# Patient Record
Sex: MALE | Race: WHITE | Employment: OTHER | URBAN - METROPOLITAN AREA
[De-identification: names, ages, dates, MRNs, and addresses within clinical notes are randomized per-mention and may not be internally consistent; named-entity substitution may affect disease eponyms.]

---

## 2017-05-30 ENCOUNTER — OP HISTORICAL/CONVERTED ENCOUNTER (OUTPATIENT)
Dept: OTHER | Age: 82
End: 2017-05-30

## 2019-08-27 ENCOUNTER — OFFICE VISIT (OUTPATIENT)
Dept: CARDIOLOGY CLINIC | Age: 84
End: 2019-08-27

## 2019-08-27 ENCOUNTER — CLINICAL SUPPORT (OUTPATIENT)
Dept: CARDIOLOGY CLINIC | Age: 84
End: 2019-08-27

## 2019-08-27 VITALS
SYSTOLIC BLOOD PRESSURE: 150 MMHG | HEART RATE: 80 BPM | BODY MASS INDEX: 25.9 KG/M2 | HEIGHT: 71 IN | WEIGHT: 185 LBS | DIASTOLIC BLOOD PRESSURE: 80 MMHG | RESPIRATION RATE: 18 BRPM | OXYGEN SATURATION: 95 %

## 2019-08-27 DIAGNOSIS — I49.5 SICK SINUS SYNDROME (HCC): ICD-10-CM

## 2019-08-27 DIAGNOSIS — E11.9 CONTROLLED TYPE 2 DIABETES MELLITUS WITHOUT COMPLICATION, WITHOUT LONG-TERM CURRENT USE OF INSULIN (HCC): ICD-10-CM

## 2019-08-27 DIAGNOSIS — Z95.0 PACEMAKER: ICD-10-CM

## 2019-08-27 DIAGNOSIS — Z95.0 CARDIAC PACEMAKER IN SITU: Primary | ICD-10-CM

## 2019-08-27 DIAGNOSIS — E78.2 MIXED HYPERLIPIDEMIA: ICD-10-CM

## 2019-08-27 DIAGNOSIS — R42 DIZZINESS: Primary | ICD-10-CM

## 2019-08-27 DIAGNOSIS — I10 ESSENTIAL HYPERTENSION: ICD-10-CM

## 2019-08-27 PROBLEM — I25.10 CORONARY ARTERY DISEASE INVOLVING NATIVE CORONARY ARTERY OF NATIVE HEART: Status: ACTIVE | Noted: 2019-08-27

## 2019-08-27 RX ORDER — METOPROLOL SUCCINATE 50 MG/1
TABLET, EXTENDED RELEASE ORAL
COMMUNITY
Start: 2019-08-06 | End: 2019-10-11 | Stop reason: SDUPTHER

## 2019-08-27 RX ORDER — DONEPEZIL HYDROCHLORIDE 5 MG/1
5 TABLET, FILM COATED ORAL
COMMUNITY
Start: 2019-08-06 | End: 2019-10-11 | Stop reason: SDUPTHER

## 2019-08-27 RX ORDER — AMLODIPINE BESYLATE AND BENAZEPRIL HYDROCHLORIDE 10; 40 MG/1; MG/1
1 CAPSULE ORAL DAILY
COMMUNITY
Start: 2019-08-06 | End: 2019-10-11 | Stop reason: SDUPTHER

## 2019-08-27 RX ORDER — CHLORTHALIDONE 25 MG/1
25 TABLET ORAL DAILY
COMMUNITY
Start: 2019-08-06 | End: 2019-10-11 | Stop reason: SDUPTHER

## 2019-08-27 RX ORDER — LEVOTHYROXINE SODIUM 100 UG/1
100 TABLET ORAL
COMMUNITY
Start: 2019-08-06 | End: 2019-10-11 | Stop reason: SDUPTHER

## 2019-08-27 RX ORDER — GLYBURIDE 5 MG/1
10 TABLET ORAL 2 TIMES DAILY WITH MEALS
COMMUNITY
Start: 2019-08-07 | End: 2019-10-11 | Stop reason: SDUPTHER

## 2019-08-27 RX ORDER — CLOPIDOGREL BISULFATE 75 MG/1
TABLET ORAL
Status: ON HOLD | COMMUNITY
Start: 2019-08-06 | End: 2019-09-25 | Stop reason: SDUPTHER

## 2019-08-27 NOTE — PROGRESS NOTES
Subjective: Bailey Crespo is a 80 y.o. male is here for EP consult. PMHx of CAD with stents, hypothyroid, HTN, DM. The patient denies chest pain, orthopnea, PND, LE edema, palpitations, syncope, presyncope or fatigue. Patient Active Problem List    Diagnosis Date Noted    Hypertension 2019    Coronary artery disease involving native coronary artery of native heart 2019    Controlled type 2 diabetes mellitus, without long-term current use of insulin ([facility] 2019    Pacemaker 2019      No primary care provider on file. Past Medical History:   Diagnosis Date    Diabetes ([facility]     Essential hypertension     Thyroid disease       Past Surgical History:   Procedure Laterality Date    HX PACEMAKER       No Known Allergies   Family History   Problem Relation Age of Onset    Diabetes Mother     Heart Surgery Father     No Known Problems Sister     No Known Problems Brother     No Known Problems Sister     No Known Problems Sister     negative for cardiac disease  Social History     Socioeconomic History    Marital status:      Spouse name: Not on file    Number of children: Not on file    Years of education: Not on file    Highest education level: Not on file   Tobacco Use    Smoking status: Former Smoker     Last attempt to quit: 1979     Years since quittin.0    Smokeless tobacco: Never Used   Substance and Sexual Activity    Alcohol use: Not Currently    Drug use: Never     Current Outpatient Medications   Medication Sig    amLODIPine-benazepril (LOTREL) 10-40 mg per capsule Take 1 Cap by mouth daily.  clopidogrel (PLAVIX) 75 mg tab     chlorthalidone (HYGROTEN) 25 mg tablet Take 25 mg by mouth daily.  donepezil (ARICEPT) 5 mg tablet Take 5 mg by mouth nightly.  glyBURIDE (DIABETA) 5 mg tablet Take 10 mg by mouth two (2) times daily (with meals).     levothyroxine (SYNTHROID) 100 mcg tablet Take 100 mcg by mouth Daily (before breakfast).  metoprolol succinate (TOPROL-XL) 50 mg XL tablet      No current facility-administered medications for this visit. Vitals:    08/27/19 1313 08/27/19 1323   BP: 169/88 150/80   Pulse: 80    Resp: 18    SpO2: 95%    Weight: 185 lb (83.9 kg)    Height: 5' 11\" (1.803 m)        I have reviewed the nurses notes, vitals, problem list, allergy list, medical history, family, social history and medications. Review of Symptoms:    General: Pt denies excessive weight gain or loss. Pt is able to conduct ADL's  HEENT: Denies blurred vision, headaches, epistaxis and difficulty swallowing. Respiratory: Reports shortness of breath, CORDOVA. Cardiovascular: Denies precordial pain, palpitations, edema or PND  Gastrointestinal: Denies poor appetite, indigestion, abdominal pain or blood in stool  Urinary: Denies dysuria, pyuria  Musculoskeletal: Denies pain or swelling from muscles or joints  Neurologic: Denies tremor, paresthesias, or sensory motor disturbance. Reports dizziness. Skin: Denies rash, itching or texture change. Psych: Denies depression    Physical Exam:      General: Well developed, in no acute distress. HEENT: Eyes - PERRL, no jvd  Heart:  Normal S1/S2 negative S3 or S4. Regular, no murmur, gallop or rub. Respiratory: Clear bilaterally x 4, no wheezing or rales  Extremities:  No edema, normal cap refill, no cyanosis. Musculoskeletal: No clubbing  Neuro: A&Ox3, speech clear, gait stable. Skin: Skin color is normal. No rashes or lesions. Non diaphoretic  Vascular: 2+ pulses symmetric in all extremities    Cardiographics    Ekg: V paced    No results found for this or any previous visit.       No results found for: WBC, HGBPOC, HGB, HGBP, HCTPOC, HCT, PHCT, RBCH, PLT, MCV, HGBEXT, HCTEXT, PLTEXT, HGBEXT, HCTEXT, PLTEXT   No results found for: NA, K, CL, CO2, AGAP, GLU, BUN, CREA, BUCR, GFRAA, GFRNA, CA, TBIL, TBILI, GPT, SGOT, AP, TP, ALB, GLOB, AGRAT, ALT   No results found for: TSH, TSH2, TSH3, TSHP, TSHEXT, TSHEXT     Assessment:             ICD-10-CM ICD-9-CM    1. Dizziness R42 780.4 ECHO ADULT COMPLETE      NUCLEAR CARDIAC STRESS TEST   2. Elevated blood pressure affecting pregnancy in first trimester, antepartum O16.1 642.33 ECHO ADULT COMPLETE      NUCLEAR CARDIAC STRESS TEST   3. Mixed hyperlipidemia E78.2 272.2 ECHO ADULT COMPLETE      NUCLEAR CARDIAC STRESS TEST   4. Essential hypertension I10 401.9 AMB POC EKG ROUTINE W/ 12 LEADS, INTER & REP      ECHO ADULT COMPLETE      NUCLEAR CARDIAC STRESS TEST   5. Controlled type 2 diabetes mellitus without complication, without long-term current use of insulin (HCC) E11.9 250.00 ECHO ADULT COMPLETE      NUCLEAR CARDIAC STRESS TEST   6. Pacemaker Z95.0 V45.01 ECHO ADULT COMPLETE      NUCLEAR CARDIAC STRESS TEST     Orders Placed This Encounter    AMB POC EKG ROUTINE W/ 12 LEADS, INTER & REP     Order Specific Question:   Reason for Exam:     Answer:   elevated blood pressure    amLODIPine-benazepril (LOTREL) 10-40 mg per capsule     Sig: Take 1 Cap by mouth daily.  clopidogrel (PLAVIX) 75 mg tab    chlorthalidone (HYGROTEN) 25 mg tablet     Sig: Take 25 mg by mouth daily.  donepezil (ARICEPT) 5 mg tablet     Sig: Take 5 mg by mouth nightly.  glyBURIDE (DIABETA) 5 mg tablet     Sig: Take 10 mg by mouth two (2) times daily (with meals).  levothyroxine (SYNTHROID) 100 mcg tablet     Sig: Take 100 mcg by mouth Daily (before breakfast).  metoprolol succinate (TOPROL-XL) 50 mg XL tablet        Plan:     Mr Brittney Gonzales is an 80year old male, who presents with limiting CORDOVA and dizziness. He has dual chamber pm, 12 % AP and 89% RVP with 5 years remaining battery. ON BB and  ARB. Will get echo with his complaints of CORDOVA, RVP and hx of CAD. Nuclear stress ordered to evaluate for ischemia. He will follow up pending test results. Continue medical management for dementia, CAD and DM.     Thank you for allowing me to participate in Everett Morales 's care. Cody Bender NP    Patient seen and examined. All pertinent data reviewed. I have reviewed detailed note as outlined by Cody Bender NP. Case discussed with Nursing/medical assistant staff and Cody Bender NP. Plans as outlined. A paced 12% for sick sinus and v paced for high grade av block. C/o campbell - will obtain stress and echo to eval for ischemic and structural disease. F/u post testing. Cont med rx for dm, cad and htn.     Genie Sun MD, Zaheer Maki

## 2019-08-27 NOTE — PROGRESS NOTES
Chief Complaint   Patient presents with   [address] [place] [address]     Seen at the request of Dr. Favio Grimes     1. Have you been to the ER, urgent care clinic since your last visit? Hospitalized since your last visit? No    2. Have you seen or consulted any other health care providers outside of the [address] [place] [address] since your last visit? Include any pap smears or colon screening. No     Pt is unsure of medical and surgical history.

## 2019-09-11 ENCOUNTER — TELEPHONE (OUTPATIENT)
Dept: CARDIOLOGY CLINIC | Age: 84
End: 2019-09-11

## 2019-09-11 NOTE — TELEPHONE ENCOUNTER
Called patient to confirm Nuclear stress test for 09/13. Reviewed with patient's daughter the need to hold all caffeine containing food, beverages and medicines for 24 hours. Pt's daughter verbalized understanding.

## 2019-09-17 ENCOUNTER — OFFICE VISIT (OUTPATIENT)
Dept: CARDIOLOGY CLINIC | Age: 84
End: 2019-09-17

## 2019-09-17 ENCOUNTER — HOSPITAL ENCOUNTER (OUTPATIENT)
Dept: LAB | Age: 84
Discharge: HOME | End: 2019-09-17
Payer: MEDICARE

## 2019-09-17 VITALS
SYSTOLIC BLOOD PRESSURE: 120 MMHG | WEIGHT: 184.1 LBS | DIASTOLIC BLOOD PRESSURE: 70 MMHG | HEIGHT: 71 IN | HEART RATE: 80 BPM | RESPIRATION RATE: 16 BRPM | BODY MASS INDEX: 25.77 KG/M2 | OXYGEN SATURATION: 95 %

## 2019-09-17 DIAGNOSIS — E78.2 MIXED HYPERLIPIDEMIA: ICD-10-CM

## 2019-09-17 DIAGNOSIS — I49.5 SICK SINUS SYNDROME (HCC): ICD-10-CM

## 2019-09-17 DIAGNOSIS — I25.10 CORONARY ARTERY DISEASE INVOLVING NATIVE CORONARY ARTERY OF NATIVE HEART, ANGINA PRESENCE UNSPECIFIED: Primary | ICD-10-CM

## 2019-09-17 DIAGNOSIS — I73.9 PAD (PERIPHERAL ARTERY DISEASE) (HCC): ICD-10-CM

## 2019-09-17 DIAGNOSIS — Z95.0 CARDIAC PACEMAKER IN SITU: ICD-10-CM

## 2019-09-17 DIAGNOSIS — I10 ESSENTIAL HYPERTENSION: ICD-10-CM

## 2019-09-17 DIAGNOSIS — R06.09 DOE (DYSPNEA ON EXERTION): ICD-10-CM

## 2019-09-17 PROCEDURE — 85027 COMPLETE CBC AUTOMATED: CPT

## 2019-09-17 PROCEDURE — 80048 BASIC METABOLIC PNL TOTAL CA: CPT

## 2019-09-17 PROCEDURE — 85610 PROTHROMBIN TIME: CPT

## 2019-09-17 PROCEDURE — 36415 COLL VENOUS BLD VENIPUNCTURE: CPT

## 2019-09-17 RX ORDER — ASPIRIN 81 MG/1
TABLET ORAL DAILY
COMMUNITY
End: 2020-10-06 | Stop reason: SDUPTHER

## 2019-09-17 NOTE — PROGRESS NOTES
1. Have you been to the ER, urgent care clinic since your last visit? Hospitalized since your last visit? No    2. Have you seen or consulted any other health care providers outside of the [address] [place] [address] since your last visit? Include any pap smears or colon screening. No    Chief Complaint   Patient presents with    Follow-up    Results    Patient here for review of recent testing C/O dizziness yesterday in am went back to bed with relief SOB with activity.

## 2019-09-17 NOTE — PROGRESS NOTES
[address], [facility]  [phone]     Subjective: Luciano Drummond is a 80 y.o. male with pmhx CAD PAD HTN HLD DM SSS with PM is here to establish care. Saw Dr Mellisa Enriquez , + campbell and dizziness. They ordered echo and stress test to evaluate. Today, he still has intermittent dizziness and limiting campbell. Per daughter, when pt exerts himself such as walking, he gets easily shortwinded with associated sweats and nausea / vomiting. Previously followed by Dr Jen Cr    The patient denies chest pain, orthopnea, PND, LE edema, palpitations, syncope, or presyncope.        Patient Active Problem List    Diagnosis Date Noted    Hypertension 2019    Coronary artery disease involving native coronary artery of native heart 2019    Controlled type 2 diabetes mellitus, without long-term current use of insulin [facility] 2019    Pacemaker 2019      Ike Dejesus MD  Past Medical History:   Diagnosis Date    Diabetes [facility])     Essential hypertension     Thyroid disease       Past Surgical History:   Procedure Laterality Date    HX PACEMAKER       No Known Allergies   Family History   Problem Relation Age of Onset    Diabetes Mother     Heart Surgery Father     No Known Problems Sister     No Known Problems Brother     No Known Problems Sister     No Known Problems Sister       Social History     Socioeconomic History    Marital status:      Spouse name: Not on file    Number of children: Not on file    Years of education: Not on file    Highest education level: Not on file   Occupational History    Not on file   Social Needs    Financial resource strain: Not on file    Food insecurity:     Worry: Not on file     Inability: Not on file    Transportation needs:     Medical: Not on file     Non-medical: Not on file   Tobacco Use    Smoking status: Former Smoker     Last attempt to quit: 1979     Years since quittin.0  Smokeless tobacco: Never Used   Substance and Sexual Activity    Alcohol use: Not Currently    Drug use: Never    Sexual activity: Not on file   Lifestyle    Physical activity:     Days per week: Not on file     Minutes per session: Not on file    Stress: Not on file   Relationships    Social connections:     Talks on phone: Not on file     Gets together: Not on file     Attends Mu-ism service: Not on file     Active member of club or organization: Not on file     Attends meetings of clubs or organizations: Not on file     Relationship status: Not on file    Intimate partner violence:     Fear of current or ex partner: Not on file     Emotionally abused: Not on file     Physically abused: Not on file     Forced sexual activity: Not on file   Other Topics Concern    Not on file   Social History Narrative    Not on file      Current Outpatient Medications   Medication Sig    aspirin delayed-release 81 mg tablet Take  by mouth daily.  amLODIPine-benazepril (LOTREL) 10-40 mg per capsule Take 1 Cap by mouth daily.  clopidogrel (PLAVIX) 75 mg tab     chlorthalidone (HYGROTEN) 25 mg tablet Take 25 mg by mouth daily.  donepezil (ARICEPT) 5 mg tablet Take 5 mg by mouth nightly.  glyBURIDE (DIABETA) 5 mg tablet Take 10 mg by mouth two (2) times daily (with meals).  levothyroxine (SYNTHROID) 100 mcg tablet Take 100 mcg by mouth Daily (before breakfast).  metoprolol succinate (TOPROL-XL) 50 mg XL tablet      No current facility-administered medications for this visit. Review of Symptoms:  11 systems reviewed, negative other than as stated in the HPI    Physical ExamPhysical Exam:    Vitals:    09/17/19 1056 09/17/19 1057 09/17/19 1058 09/17/19 1059   BP: 150/80 110/70 90/60 120/70   Pulse:  80     Resp:  16     SpO2:  95%     Weight:  184 lb 1.6 oz (83.5 kg)     Height:  5' 11\" (1.803 m)       Body mass index is 25.68 kg/m². General PE  Gen:  NAD  Mental Status - Alert.  General Appearance - Not in acute distress. HEENT:  PERRL, no carotid bruits or JVD  Chest and Lung Exam   Inspection: Accessory muscles - No use of accessory muscles in breathing. Auscultation:   Breath sounds: - Normal.   Cardiovascular   Inspection: Jugular vein - Bilateral - Inspection Normal.   Palpation/Percussion:   Apical Impulse: - Normal.   Auscultation: Rhythm - Regular. Heart Sounds - S1 WNL and S2 WNL. No S3 or S4. Murmurs & Other Heart Sounds: Auscultation of the heart reveals - No Murmurs. Peripheral Vascular   Upper Extremity: Inspection - Bilateral - No Cyanotic nailbeds or Digital clubbing. Lower Extremity:   Palpation: Edema - Bilateral - No edema. Abdomen:   Soft, non-tender, bowel sounds are active. Neuro: A&O times 3, CN and motor grossly WNL    Labs:   No results found for: CHOL, CHOLX, CHLST, CHOLV, 381663, HDL, HDLP, LDL, LDLC, DLDLP, TGLX, TRIGL, TRIGP, CHHD, CHHDX  No results found for: CPK, CPKX, CPX  No results found for: NA, K, CL, CO2, AGAP, GLU, BUN, CREA, BUCR, GFRAA, GFRNA, CA, TBIL, TBILI, GPT, SGOT, AP, TP, ALB, GLOB, AGRAT, ALT    EKG:       Assessment:     Assessment:      1. Coronary artery disease involving native coronary artery of native heart, angina presence unspecified    2. Sick sinus syndrome (HCC)    3. Cardiac pacemaker in situ    4. Mixed hyperlipidemia    5. Essential hypertension    6. Abnormal nuclear stress test    7. PAD (peripheral artery disease) ([facility]        Orders Placed This Encounter    aspirin delayed-release 81 mg tablet     Sig: Take  by mouth daily. Plan: This is a 80 y.o. male with pmhx CAD PAD HTN HLD DM SSS with PM is here to establish care. Saw Dr Amanda Avery 8/19, + campbell and dizziness. They ordered echo and stress test to evaluate. Today, he still has intermittent dizziness and limiting campbell. Per daughter, when pt exerts himself such as walking, he gets easily shortwinded with associated sweats and nausea / vomiting.   Previously followed by Dr Delmy Gurrola. They are not sure if he had stents placed in his heart or his legs or both. CAD  +ve NST on 9/19  9 episodes VT per device check 8/19  He is on ASA, Plavix  On 2 antianginals: BB, Amlodipine  On statin  Will check labs and schedule for cardiac cath with Dr Deal Reuben: [id number]     Normal EF, mild AS per echo 9/19    HTN  Controlled with current therapy    SSS post PM  8/19 device check: x 9 VT last 6/14/19  Normal EF, mild AS per echo 9/19  Device followed by Dr Manjit Benoit  On statin. Labs and lipids per PCP. DM  On oral agent      Continue current care and f/u post cardiac cath      Yamile Reed NP       [facility]    9/18/2019         Patient seen, examined by me personally. Plan discussed as detailed. Agree with note as outlined by  NP. I confirm findings in history and physical exam. No additional findings noted. Agree with plan as outlined above. Known CAD, runs of VT, abnormal stress test. Discussed cath/PCI with patient and daughter. Radial pulse is sluggish. May need femoral approach.     Susanna Whalen MD

## 2019-09-17 NOTE — H&P (VIEW-ONLY)
[address], [address] [facility]  [phone] Subjective: Vishal Padilla is a 80 y.o. male with pmhx CAD PAD HTN HLD DM SSS with PM is here to establish care. Saw Dr Janet Quintero , + campbell and dizziness. They ordered echo and stress test to evaluate. Today, he still has intermittent dizziness and limiting campbell. Per daughter, when pt exerts himself such as walking, he gets easily shortwinded with associated sweats and nausea / vomiting. Previously followed by Dr Yusuf Marley The patient denies chest pain, orthopnea, PND, LE edema, palpitations, syncope, or presyncope. Patient Active Problem List  
 Diagnosis Date Noted  Hypertension 2019  Coronary artery disease involving native coronary artery of native heart 2019  Controlled type 2 diabetes mellitus, without long-term current use of insulin ([address] 2019  Pacemaker 2019 Tressa Mcelroy MD 
Past Medical History:  
Diagnosis Date  Diabetes ([address]  Essential hypertension  Thyroid disease Past Surgical History:  
Procedure Laterality Date  HX PACEMAKER No Known Allergies Family History Problem Relation Age of Onset  Diabetes Mother  Heart Surgery Father  No Known Problems Sister  No Known Problems Brother  No Known Problems Sister  No Known Problems Sister Social History Socioeconomic History  Marital status:  Spouse name: Not on file  Number of children: Not on file  Years of education: Not on file  Highest education level: Not on file Occupational History  Not on file Social Needs  Financial resource strain: Not on file  Food insecurity:  
  Worry: Not on file Inability: Not on file  Transportation needs:  
  Medical: Not on file Non-medical: Not on file Tobacco Use  Smoking status: Former Smoker Last attempt to quit: 1979   Years since quittin.0  Smokeless tobacco: Never Used Substance and Sexual Activity  Alcohol use: Not Currently  Drug use: Never  Sexual activity: Not on file Lifestyle  Physical activity:  
  Days per week: Not on file Minutes per session: Not on file  Stress: Not on file Relationships  Social connections:  
  Talks on phone: Not on file Gets together: Not on file Attends Hinduism service: Not on file Active member of club or organization: Not on file Attends meetings of clubs or organizations: Not on file Relationship status: Not on file  Intimate partner violence:  
  Fear of current or ex partner: Not on file Emotionally abused: Not on file Physically abused: Not on file Forced sexual activity: Not on file Other Topics Concern  Not on file Social History Narrative  Not on file Current Outpatient Medications Medication Sig  
 aspirin delayed-release 81 mg tablet Take  by mouth daily.  amLODIPine-benazepril (LOTREL) 10-40 mg per capsule Take 1 Cap by mouth daily.  clopidogrel (PLAVIX) 75 mg tab  chlorthalidone (HYGROTEN) 25 mg tablet Take 25 mg by mouth daily.  donepezil (ARICEPT) 5 mg tablet Take 5 mg by mouth nightly.  glyBURIDE (DIABETA) 5 mg tablet Take 10 mg by mouth two (2) times daily (with meals).  levothyroxine (SYNTHROID) 100 mcg tablet Take 100 mcg by mouth Daily (before breakfast).  metoprolol succinate (TOPROL-XL) 50 mg XL tablet No current facility-administered medications for this visit. Review of Symptoms: 
11 systems reviewed, negative other than as stated in the HPI Physical ExamPhysical Exam:   
Vitals:  
 09/17/19 1056 09/17/19 1057 09/17/19 1058 09/17/19 1059 BP: 150/80 110/70 90/60 120/70 Pulse:  80 Resp:  16 SpO2:  95% Weight:  184 lb 1.6 oz (83.5 kg) Height:  5' 11\" (1.803 m) Body mass index is 25.68 kg/m². General PE Gen:  NAD Mental Status - Alert.  General Appearance - Not in acute distress. HEENT: 
PERRL, no carotid bruits or JVD Chest and Lung Exam  
Inspection: Accessory muscles - No use of accessory muscles in breathing. Auscultation:  
Breath sounds: - Normal.  
Cardiovascular Inspection: Jugular vein - Bilateral - Inspection Normal.  
Palpation/Percussion:  
Apical Impulse: - Normal.  
Auscultation: Rhythm - Regular. Heart Sounds - S1 WNL and S2 WNL. No S3 or S4. Murmurs & Other Heart Sounds: Auscultation of the heart reveals - No Murmurs. Peripheral Vascular Upper Extremity: Inspection - Bilateral - No Cyanotic nailbeds or Digital clubbing. Lower Extremity:  
Palpation: Edema - Bilateral - No edema. Abdomen:   Soft, non-tender, bowel sounds are active. Neuro: A&O times 3, CN and motor grossly WNL Labs:  
No results found for: CHOL, CHOLX, CHLST, CHOLV, 711117, HDL, HDLP, LDL, LDLC, DLDLP, TGLX, TRIGL, TRIGP, CHHD, CHHDX No results found for: CPK, CPKX, CPX No results found for: NA, K, CL, CO2, AGAP, GLU, BUN, CREA, BUCR, GFRAA, GFRNA, CA, TBIL, TBILI, GPT, SGOT, AP, TP, ALB, GLOB, AGRAT, ALT 
 
EKG: 
 
 
 Assessment: 
 
 Assessment: 1. Coronary artery disease involving native coronary artery of native heart, angina presence unspecified 2. Sick sinus syndrome ([address] 3. Cardiac pacemaker in situ 4. Mixed hyperlipidemia 5. Essential hypertension 6. Abnormal nuclear stress test   
7. PAD (peripheral artery disease) ([address] Orders Placed This Encounter  aspirin delayed-release 81 mg tablet Sig: Take  by mouth daily. Plan: This is a 80 y.o. male with pmhx CAD PAD HTN HLD DM SSS with PM is here to establish care. Saw Dr Sage Cesar 8/19, + campbell and dizziness. They ordered echo and stress test to evaluate. Today, he still has intermittent dizziness and limiting campbell. Per daughter, when pt exerts himself such as walking, he gets easily shortwinded with associated sweats and nausea / vomiting.  
Previously followed by Dr Nadia You. They are not sure if he had stents placed in his heart or his legs or both. CAD 
+ve NST on 9/19 
9 episodes VT per device check 8/19 He is on ASA, Plavix On 2 antianginals: BB, Amlodipine On statin Will check labs and schedule for cardiac cath with Dr Ontiveros Code: 15024 Normal EF, mild AS per echo 9/19 HTN Controlled with current therapy SSS post PM 
8/19 device check: x 9 VT last 6/14/19 Normal EF, mild AS per echo 9/19 Device followed by Dr Subhash Syed On statin. Labs and lipids per PCP. DM On oral agent Continue current care and f/u post cardiac cath Derrell Morris NP [facility] 9/18/2019 Patient seen, examined by me personally. Plan discussed as detailed. Agree with note as outlined by  NP. I confirm findings in history and physical exam. No additional findings noted. Agree with plan as outlined above. Known CAD, runs of VT, abnormal stress test. Discussed cath/PCI with patient and daughter. Radial pulse is sluggish. May need femoral approach.  
 
Zenobia Bradley MD

## 2019-09-18 DIAGNOSIS — O16.1 ELEVATED BLOOD PRESSURE AFFECTING PREGNANCY IN FIRST TRIMESTER, ANTEPARTUM: ICD-10-CM

## 2019-09-18 DIAGNOSIS — E11.9 CONTROLLED TYPE 2 DIABETES MELLITUS WITHOUT COMPLICATION, WITHOUT LONG-TERM CURRENT USE OF INSULIN (HCC): ICD-10-CM

## 2019-09-18 LAB
BUN SERPL-MCNC: 26 MG/DL (ref 8–27)
BUN/CREAT SERPL: 14 (ref 10–24)
CALCIUM SERPL-MCNC: 9.7 MG/DL (ref 8.6–10.2)
CHLORIDE SERPL-SCNC: 94 MMOL/L (ref 96–106)
CO2 SERPL-SCNC: 21 MMOL/L (ref 20–29)
CREAT SERPL-MCNC: 1.91 MG/DL (ref 0.76–1.27)
ERYTHROCYTE [DISTWIDTH] IN BLOOD BY AUTOMATED COUNT: 13 % (ref 12.3–15.4)
GLUCOSE SERPL-MCNC: 256 MG/DL (ref 65–99)
HCT VFR BLD AUTO: 43.6 % (ref 37.5–51)
HGB BLD-MCNC: 14.6 G/DL (ref 13–17.7)
INR PPP: 1 (ref 0.8–1.2)
INTERPRETATION: NORMAL
MCH RBC QN AUTO: 29 PG (ref 26.6–33)
MCHC RBC AUTO-ENTMCNC: 33.5 G/DL (ref 31.5–35.7)
MCV RBC AUTO: 87 FL (ref 79–97)
PLATELET # BLD AUTO: 341 X10E3/UL (ref 150–450)
POTASSIUM SERPL-SCNC: 4.7 MMOL/L (ref 3.5–5.2)
PROTHROMBIN TIME: 11 SEC (ref 9.1–12)
RBC # BLD AUTO: 5.03 X10E6/UL (ref 4.14–5.8)
SODIUM SERPL-SCNC: 132 MMOL/L (ref 134–144)
WBC # BLD AUTO: 11.4 X10E3/UL (ref 3.4–10.8)

## 2019-09-23 ENCOUNTER — APPOINTMENT (OUTPATIENT)
Dept: VASCULAR SURGERY | Age: 84
DRG: 246 | End: 2019-09-23
Attending: PHYSICIAN ASSISTANT
Payer: MEDICARE

## 2019-09-23 ENCOUNTER — HOSPITAL ENCOUNTER (INPATIENT)
Age: 84
LOS: 1 days | Discharge: HOME | DRG: 246 | End: 2019-09-25
Attending: INTERNAL MEDICINE | Admitting: INTERNAL MEDICINE
Payer: MEDICARE

## 2019-09-23 DIAGNOSIS — Z01.810 PREOP CARDIOVASCULAR EXAM: ICD-10-CM

## 2019-09-23 DIAGNOSIS — R07.9 CHEST PAIN, UNSPECIFIED TYPE: ICD-10-CM

## 2019-09-23 DIAGNOSIS — I65.23 BILATERAL CAROTID ARTERY STENOSIS: ICD-10-CM

## 2019-09-23 PROBLEM — I20.9 ANGINA, CLASS III (HCC): Status: ACTIVE | Noted: 2019-09-23

## 2019-09-23 LAB
END DIASTOLIC PRESSURE: 10
GLUCOSE BLD STRIP.AUTO-MCNC: 153 MG/DL (ref 65–100)
GLUCOSE BLD STRIP.AUTO-MCNC: 183 MG/DL (ref 65–100)
LEFT CCA DIST DIAS: 16 CM/S
LEFT CCA DIST SYS: 54.8 CM/S
LEFT CCA PROX DIAS: 13.1 CM/S
LEFT CCA PROX SYS: 40.8 CM/S
LEFT ECA DIAS: 0 CM/S
LEFT ECA SYS: 21.4 CM/S
LEFT ICA DIST DIAS: 9 CM/S
LEFT ICA DIST SYS: 38.7 CM/S
LEFT ICA MID DIAS: 12.5 CM/S
LEFT ICA MID SYS: 49.2 CM/S
LEFT ICA PROX DIAS: 11.7 CM/S
LEFT ICA PROX SYS: 41.4 CM/S
LEFT ICA/CCA SYS: 0.9
LEFT SUBCLAVIAN DIAS: 0 CM/S
LEFT SUBCLAVIAN SYS: 65.5 CM/S
LEFT VERTEBRAL DIAS: 10.64 CM/S
LEFT VERTEBRAL SYS: 60.1 CM/S
RIGHT CCA DIST DIAS: 0 CM/S
RIGHT CCA DIST SYS: 54.8 CM/S
RIGHT CCA PROX DIAS: 0 CM/S
RIGHT CCA PROX SYS: 50.6 CM/S
RIGHT ECA DIAS: 0 CM/S
RIGHT ECA SYS: 99.6 CM/S
RIGHT ICA DIST DIAS: 10.1 CM/S
RIGHT ICA DIST SYS: 41.5 CM/S
RIGHT ICA MID DIAS: 14.4 CM/S
RIGHT ICA MID SYS: 59.6 CM/S
RIGHT ICA PROX DIAS: 14.4 CM/S
RIGHT ICA PROX SYS: 62.8 CM/S
RIGHT ICA/CCA SYS: 1.2
RIGHT SUBCLAVIAN DIAS: 0 CM/S
RIGHT SUBCLAVIAN SYS: 58.5 CM/S
RIGHT VERTEBRAL DIAS: 11.29 CM/S
RIGHT VERTEBRAL SYS: 48.3 CM/S
SERVICE CMNT-IMP: ABNORMAL
SERVICE CMNT-IMP: ABNORMAL

## 2019-09-23 PROCEDURE — 99218 HC RM OBSERVATION: CPT

## 2019-09-23 PROCEDURE — 82962 GLUCOSE BLOOD TEST: CPT

## 2019-09-23 PROCEDURE — B2151ZZ FLUOROSCOPY OF LEFT HEART USING LOW OSMOLAR CONTRAST: ICD-10-PCS | Performed by: INTERNAL MEDICINE

## 2019-09-23 PROCEDURE — B241ZZ3 ULTRASONOGRAPHY OF MULTIPLE CORONARY ARTERIES, INTRAVASCULAR: ICD-10-PCS | Performed by: INTERNAL MEDICINE

## 2019-09-23 PROCEDURE — 74011000250 HC RX REV CODE- 250: Performed by: INTERNAL MEDICINE

## 2019-09-23 PROCEDURE — 77030015766: Performed by: INTERNAL MEDICINE

## 2019-09-23 PROCEDURE — 99152 MOD SED SAME PHYS/QHP 5/>YRS: CPT | Performed by: INTERNAL MEDICINE

## 2019-09-23 PROCEDURE — C1894 INTRO/SHEATH, NON-LASER: HCPCS | Performed by: INTERNAL MEDICINE

## 2019-09-23 PROCEDURE — B2111ZZ FLUOROSCOPY OF MULTIPLE CORONARY ARTERIES USING LOW OSMOLAR CONTRAST: ICD-10-PCS | Performed by: INTERNAL MEDICINE

## 2019-09-23 PROCEDURE — C1769 GUIDE WIRE: HCPCS | Performed by: INTERNAL MEDICINE

## 2019-09-23 PROCEDURE — 74011250636 HC RX REV CODE- 250/636: Performed by: INTERNAL MEDICINE

## 2019-09-23 PROCEDURE — 77030004549 HC CATH ANGI DX PRF MRTM -A: Performed by: INTERNAL MEDICINE

## 2019-09-23 PROCEDURE — 74011250636 HC RX REV CODE- 250/636: Performed by: NURSE PRACTITIONER

## 2019-09-23 PROCEDURE — 74011250637 HC RX REV CODE- 250/637: Performed by: NURSE PRACTITIONER

## 2019-09-23 PROCEDURE — 77030028837 HC SYR ANGI PWR INJ COEU -A: Performed by: INTERNAL MEDICINE

## 2019-09-23 PROCEDURE — 77030010221 HC SPLNT WR POS TELE -B: Performed by: INTERNAL MEDICINE

## 2019-09-23 PROCEDURE — 4A023N7 MEASUREMENT OF CARDIAC SAMPLING AND PRESSURE, LEFT HEART, PERCUTANEOUS APPROACH: ICD-10-PCS | Performed by: INTERNAL MEDICINE

## 2019-09-23 PROCEDURE — 74011250636 HC RX REV CODE- 250/636

## 2019-09-23 PROCEDURE — 74011636320 HC RX REV CODE- 636/320: Performed by: INTERNAL MEDICINE

## 2019-09-23 PROCEDURE — 77030008543 HC TBNG MON PRSS MRTM -A: Performed by: INTERNAL MEDICINE

## 2019-09-23 PROCEDURE — 93880 EXTRACRANIAL BILAT STUDY: CPT

## 2019-09-23 PROCEDURE — 77030019569 HC BND COMPR RAD TERU -B: Performed by: INTERNAL MEDICINE

## 2019-09-23 PROCEDURE — 99153 MOD SED SAME PHYS/QHP EA: CPT | Performed by: INTERNAL MEDICINE

## 2019-09-23 PROCEDURE — 77030019698 HC SYR ANGI MDLON MRTM -A: Performed by: INTERNAL MEDICINE

## 2019-09-23 PROCEDURE — 93458 L HRT ARTERY/VENTRICLE ANGIO: CPT | Performed by: INTERNAL MEDICINE

## 2019-09-23 RX ORDER — SODIUM CHLORIDE 0.9 % (FLUSH) 0.9 %
5-40 SYRINGE (ML) INJECTION EVERY 8 HOURS
Status: DISCONTINUED | OUTPATIENT
Start: 2019-09-23 | End: 2019-09-25 | Stop reason: HOSPADM

## 2019-09-23 RX ORDER — NALOXONE HYDROCHLORIDE 0.4 MG/ML
0.4 INJECTION, SOLUTION INTRAMUSCULAR; INTRAVENOUS; SUBCUTANEOUS AS NEEDED
Status: DISCONTINUED | OUTPATIENT
Start: 2019-09-23 | End: 2019-09-25 | Stop reason: HOSPADM

## 2019-09-23 RX ORDER — HEPARIN SODIUM 200 [USP'U]/100ML
INJECTION, SOLUTION INTRAVENOUS
Status: DISCONTINUED | OUTPATIENT
Start: 2019-09-23 | End: 2019-09-23

## 2019-09-23 RX ORDER — VERAPAMIL HYDROCHLORIDE 2.5 MG/ML
INJECTION, SOLUTION INTRAVENOUS AS NEEDED
Status: DISCONTINUED | OUTPATIENT
Start: 2019-09-23 | End: 2019-09-23

## 2019-09-23 RX ORDER — ACETAMINOPHEN 325 MG/1
650 TABLET ORAL
Status: DISCONTINUED | OUTPATIENT
Start: 2019-09-23 | End: 2019-09-25 | Stop reason: HOSPADM

## 2019-09-23 RX ORDER — FENTANYL CITRATE 50 UG/ML
INJECTION, SOLUTION INTRAMUSCULAR; INTRAVENOUS AS NEEDED
Status: DISCONTINUED | OUTPATIENT
Start: 2019-09-23 | End: 2019-09-23

## 2019-09-23 RX ORDER — GLYBURIDE 5 MG/1
10 TABLET ORAL 2 TIMES DAILY WITH MEALS
Status: DISCONTINUED | OUTPATIENT
Start: 2019-09-23 | End: 2019-09-25 | Stop reason: HOSPADM

## 2019-09-23 RX ORDER — SODIUM CHLORIDE 9 MG/ML
50 INJECTION, SOLUTION INTRAVENOUS CONTINUOUS
Status: DISCONTINUED | OUTPATIENT
Start: 2019-09-23 | End: 2019-09-25 | Stop reason: HOSPADM

## 2019-09-23 RX ORDER — LIDOCAINE HYDROCHLORIDE 10 MG/ML
INJECTION, SOLUTION EPIDURAL; INFILTRATION; INTRACAUDAL; PERINEURAL AS NEEDED
Status: DISCONTINUED | OUTPATIENT
Start: 2019-09-23 | End: 2019-09-23

## 2019-09-23 RX ORDER — DONEPEZIL HYDROCHLORIDE 5 MG/1
5 TABLET, FILM COATED ORAL
Status: DISCONTINUED | OUTPATIENT
Start: 2019-09-23 | End: 2019-09-25 | Stop reason: HOSPADM

## 2019-09-23 RX ORDER — SODIUM CHLORIDE 9 MG/ML
50 INJECTION, SOLUTION INTRAVENOUS CONTINUOUS
Status: DISCONTINUED | OUTPATIENT
Start: 2019-09-23 | End: 2019-09-23 | Stop reason: SDUPTHER

## 2019-09-23 RX ORDER — MIDAZOLAM HYDROCHLORIDE 1 MG/ML
INJECTION, SOLUTION INTRAMUSCULAR; INTRAVENOUS AS NEEDED
Status: DISCONTINUED | OUTPATIENT
Start: 2019-09-23 | End: 2019-09-23

## 2019-09-23 RX ORDER — SODIUM CHLORIDE 0.9 % (FLUSH) 0.9 %
5-40 SYRINGE (ML) INJECTION AS NEEDED
Status: DISCONTINUED | OUTPATIENT
Start: 2019-09-23 | End: 2019-09-25 | Stop reason: HOSPADM

## 2019-09-23 RX ORDER — HEPARIN SODIUM 1000 [USP'U]/ML
INJECTION, SOLUTION INTRAVENOUS; SUBCUTANEOUS AS NEEDED
Status: DISCONTINUED | OUTPATIENT
Start: 2019-09-23 | End: 2019-09-23

## 2019-09-23 RX ORDER — METOPROLOL SUCCINATE 50 MG/1
50 TABLET, EXTENDED RELEASE ORAL DAILY
Status: DISCONTINUED | OUTPATIENT
Start: 2019-09-24 | End: 2019-09-25 | Stop reason: HOSPADM

## 2019-09-23 RX ORDER — CLOPIDOGREL BISULFATE 75 MG/1
75 TABLET ORAL DAILY
Status: DISCONTINUED | OUTPATIENT
Start: 2019-09-24 | End: 2019-09-25 | Stop reason: HOSPADM

## 2019-09-23 RX ORDER — CHLORTHALIDONE 25 MG/1
25 TABLET ORAL DAILY
Status: DISCONTINUED | OUTPATIENT
Start: 2019-09-24 | End: 2019-09-25 | Stop reason: HOSPADM

## 2019-09-23 RX ORDER — ASPIRIN 81 MG/1
81 TABLET ORAL DAILY
Status: DISCONTINUED | OUTPATIENT
Start: 2019-09-24 | End: 2019-09-25 | Stop reason: HOSPADM

## 2019-09-23 RX ADMIN — GLYBURIDE 10 MG: 5 TABLET ORAL at 17:43

## 2019-09-23 RX ADMIN — Medication 10 ML: at 21:02

## 2019-09-23 RX ADMIN — SODIUM CHLORIDE 150 ML/HR: 900 INJECTION, SOLUTION INTRAVENOUS at 12:00

## 2019-09-23 RX ADMIN — DONEPEZIL HYDROCHLORIDE 5 MG: 5 TABLET, FILM COATED ORAL at 21:01

## 2019-09-23 RX ADMIN — SODIUM CHLORIDE 50 ML/HR: 900 INJECTION, SOLUTION INTRAVENOUS at 17:42

## 2019-09-23 NOTE — Clinical Note
Single view of the left ventricle obtained using power injection. Total volume = 18 mL. Rate = 12 mL/sec. Pressure = 900 PSI.

## 2019-09-23 NOTE — Clinical Note
Lesion: Located in the Distal RCA. Stent deployed. Single technique used. First inflation pressure = 16 sanjeev; inflation time: 17 sec.

## 2019-09-23 NOTE — Clinical Note
Lesion located in the Distal RCA. Balloon inflated using single inflation technique. Pressure = 12 sanjeev; Duration = 25 sec.

## 2019-09-23 NOTE — Clinical Note
Lesion: Located in the Distal LM. Stent inserted. Single technique used. First inflation pressure = 16 sanjeev; inflation time: 8 sec.  [id number] XIENCE TONIE RX JOSE

## 2019-09-23 NOTE — Clinical Note
TRANSFER - OUT REPORT:     Verbal report given to: Kana Lucas RN. Report consisted of patient's Situation, Background, Assessment and   Recommendations(SBAR). Opportunity for questions and clarification was provided. Patient transported with a Registered Nurse and [address] [place] [address] / [facility]. Patient transported to: IVCU.

## 2019-09-23 NOTE — INTERVAL H&P NOTE
H&P Update:  Adri Bullion was seen and examined. History and physical has been reviewed. The patient has been examined.  There have been no significant clinical changes since the completion of the originally dated History and Physical.

## 2019-09-23 NOTE — Clinical Note
Lesion located in the Distal RCA. Balloon inflated using single inflation technique. Pressure = 10 sanjeev; Duration = 15 sec.

## 2019-09-23 NOTE — PROGRESS NOTES
Cath findings discussed with Dr. Natalie Pate and Dr. Terry Hall. Plan for multi vessel PCI. May have to be staged. Discussed with patient and daughter. Continue IV fluids.

## 2019-09-23 NOTE — PROGRESS NOTES
Mr. Deyanira Burrell is s/p cardiac cath with significant CAD. Cardiac surgery has reviewed but feel that who would be a better candidate for PCI. Plan for LAD and possible RCA PCI/stent tomorrow.

## 2019-09-23 NOTE — Clinical Note
Lesion: Located in the Proximal LAD. Stent inserted. Stent deployed. Single technique used. First inflation pressure = 16 sanjeev; inflation time: 15 sec.

## 2019-09-23 NOTE — Clinical Note
Lesion: Located in the Proximal Cx. Stent deployed. Single technique used. First inflation pressure = 16 sanjeev; inflation time: 15 sec.

## 2019-09-23 NOTE — Clinical Note
Lesion located in the Proximal Cx. Balloon inserted. Balloon inflated using multiple inflations inflation technique. Pressure = 8 sanjeev; Duration = 10 sec. Inflation 2: Pressure: 8 sanjeev; Duration: 12 sec. Inflation 3: Pressure: 12 sanjeev; Duration: 8 sec.

## 2019-09-23 NOTE — Clinical Note
Unable to advance guide catheter in arm. Switching to Right femoral access. Patient prepped and draped.

## 2019-09-23 NOTE — Clinical Note
TRANSFER - OUT REPORT:     Verbal report given to: jose david siu. Report consisted of patient's Situation, Background, Assessment and   Recommendations(SBAR). Opportunity for questions and clarification was provided. Patient transported with a Registered Nurse and [address] [place] [address] / [facility]. Patient transported to: [facility].

## 2019-09-23 NOTE — Clinical Note
Lesion located in the Distal LM. Balloon inserted. Balloon inflated using multiple inflations inflation technique. Pressure = 8 sanjeev; Duration = 5 sec. Inflation 2: Pressure: 12 sanjeev; Duration: 6 sec.

## 2019-09-23 NOTE — PROGRESS NOTES
CM noted pt's observation status. CM conducted room visit to provide pt with obs letters. CM explained observation documents to pt and family; signed copies placed in pt's chart.     Martin Kiser MSW  Care Manager, [address] [place] [address]

## 2019-09-23 NOTE — Clinical Note
Sheath #2: Sheath: inserted. Sheath inserted/placed in the right femoral  artery.  6f micro puncture sheath

## 2019-09-23 NOTE — Clinical Note
Sheath #1: Closed using R-Band. Radial band pressure set at: 15. For information on Fall & Injury Prevention, visit [email or web address]

## 2019-09-23 NOTE — PROGRESS NOTES
Bedside and Verbal shift change report given to Myron Gtz (oncoming nurse) by Carlos Miller (offgoing nurse). Report included the following information SBAR, Kardex, Intake/Output, MAR, Accordion and Recent Results.

## 2019-09-24 PROBLEM — N18.9 CKD (CHRONIC KIDNEY DISEASE): Status: ACTIVE | Noted: 2019-09-24

## 2019-09-24 PROBLEM — Z98.890 S/P CARDIAC CATH: Status: ACTIVE | Noted: 2019-09-24

## 2019-09-24 LAB
ANION GAP SERPL CALC-SCNC: 6 MMOL/L (ref 5–15)
BUN SERPL-MCNC: 19 MG/DL (ref 6–20)
BUN/CREAT SERPL: 14 (ref 12–20)
CALCIUM SERPL-MCNC: 8.4 MG/DL (ref 8.5–10.1)
CHLORIDE SERPL-SCNC: 104 MMOL/L (ref 97–108)
CO2 SERPL-SCNC: 25 MMOL/L (ref 21–32)
CREAT SERPL-MCNC: 1.34 MG/DL (ref 0.7–1.3)
GLUCOSE BLD STRIP.AUTO-MCNC: 162 MG/DL (ref 65–100)
GLUCOSE BLD STRIP.AUTO-MCNC: 173 MG/DL (ref 65–100)
GLUCOSE SERPL-MCNC: 145 MG/DL (ref 65–100)
POTASSIUM SERPL-SCNC: 3.7 MMOL/L (ref 3.5–5.1)
SERVICE CMNT-IMP: ABNORMAL
SERVICE CMNT-IMP: ABNORMAL
SODIUM SERPL-SCNC: 135 MMOL/L (ref 136–145)

## 2019-09-24 PROCEDURE — C1769 GUIDE WIRE: HCPCS | Performed by: INTERNAL MEDICINE

## 2019-09-24 PROCEDURE — 92928 PRQ TCAT PLMT NTRAC ST 1 LES: CPT | Performed by: INTERNAL MEDICINE

## 2019-09-24 PROCEDURE — 93005 ELECTROCARDIOGRAM TRACING: CPT

## 2019-09-24 PROCEDURE — 74011000250 HC RX REV CODE- 250: Performed by: INTERNAL MEDICINE

## 2019-09-24 PROCEDURE — 77030019697 HC SYR ANGI INFL MRTM -B: Performed by: INTERNAL MEDICINE

## 2019-09-24 PROCEDURE — 74011250637 HC RX REV CODE- 250/637: Performed by: INTERNAL MEDICINE

## 2019-09-24 PROCEDURE — 74011250636 HC RX REV CODE- 250/636: Performed by: INTERNAL MEDICINE

## 2019-09-24 PROCEDURE — C1887 CATHETER, GUIDING: HCPCS | Performed by: INTERNAL MEDICINE

## 2019-09-24 PROCEDURE — 77030012468 HC VLV BLEEDBK CNTRL ABBT -B: Performed by: INTERNAL MEDICINE

## 2019-09-24 PROCEDURE — 77030002996 HC SUT SLK J&J -A: Performed by: INTERNAL MEDICINE

## 2019-09-24 PROCEDURE — 77030013529 HC DEV PLLBK SLED BSC -B: Performed by: INTERNAL MEDICINE

## 2019-09-24 PROCEDURE — 76937 US GUIDE VASCULAR ACCESS: CPT | Performed by: INTERNAL MEDICINE

## 2019-09-24 PROCEDURE — 99218 HC RM OBSERVATION: CPT

## 2019-09-24 PROCEDURE — 99153 MOD SED SAME PHYS/QHP EA: CPT | Performed by: INTERNAL MEDICINE

## 2019-09-24 PROCEDURE — 77030028837 HC SYR ANGI PWR INJ COEU -A: Performed by: INTERNAL MEDICINE

## 2019-09-24 PROCEDURE — 74011250636 HC RX REV CODE- 250/636

## 2019-09-24 PROCEDURE — 027337Z DILATION OF CORONARY ARTERY, FOUR OR MORE ARTERIES WITH FOUR OR MORE DRUG-ELUTING INTRALUMINAL DEVICES, PERCUTANEOUS APPROACH: ICD-10-PCS | Performed by: INTERNAL MEDICINE

## 2019-09-24 PROCEDURE — 80048 BASIC METABOLIC PNL TOTAL CA: CPT

## 2019-09-24 PROCEDURE — 36415 COLL VENOUS BLD VENIPUNCTURE: CPT

## 2019-09-24 PROCEDURE — 74011250637 HC RX REV CODE- 250/637: Performed by: NURSE PRACTITIONER

## 2019-09-24 PROCEDURE — 74011636320 HC RX REV CODE- 636/320: Performed by: INTERNAL MEDICINE

## 2019-09-24 PROCEDURE — C1894 INTRO/SHEATH, NON-LASER: HCPCS | Performed by: INTERNAL MEDICINE

## 2019-09-24 PROCEDURE — C1874 STENT, COATED/COV W/DEL SYS: HCPCS | Performed by: INTERNAL MEDICINE

## 2019-09-24 PROCEDURE — 74011000258 HC RX REV CODE- 258: Performed by: INTERNAL MEDICINE

## 2019-09-24 PROCEDURE — C1753 CATH, INTRAVAS ULTRASOUND: HCPCS | Performed by: INTERNAL MEDICINE

## 2019-09-24 PROCEDURE — 82962 GLUCOSE BLOOD TEST: CPT

## 2019-09-24 PROCEDURE — 77030015766: Performed by: INTERNAL MEDICINE

## 2019-09-24 PROCEDURE — 99152 MOD SED SAME PHYS/QHP 5/>YRS: CPT | Performed by: INTERNAL MEDICINE

## 2019-09-24 PROCEDURE — 77030019569 HC BND COMPR RAD TERU -B: Performed by: INTERNAL MEDICINE

## 2019-09-24 PROCEDURE — C1725 CATH, TRANSLUMIN NON-LASER: HCPCS | Performed by: INTERNAL MEDICINE

## 2019-09-24 DEVICE — XIENCE SIERRA™ EVEROLIMUS ELUTING CORONARY STENT SYSTEM 4.00 MM X 08 MM / RAPID-EXCHANGE
Type: IMPLANTABLE DEVICE | Status: FUNCTIONAL
Brand: XIENCE SIERRA™

## 2019-09-24 DEVICE — XIENCE SIERRA™ EVEROLIMUS ELUTING CORONARY STENT SYSTEM 4.00 MM X 12 MM / RAPID-EXCHANGE
Type: IMPLANTABLE DEVICE | Status: FUNCTIONAL
Brand: XIENCE SIERRA™

## 2019-09-24 DEVICE — STENT RONYX35015UX RESOLUTE ONYX 3.50X15
Type: IMPLANTABLE DEVICE | Status: FUNCTIONAL
Brand: RESOLUTE ONYX™

## 2019-09-24 DEVICE — XIENCE SIERRA™ EVEROLIMUS ELUTING CORONARY STENT SYSTEM 2.25 MM X 12 MM / RAPID-EXCHANGE
Type: IMPLANTABLE DEVICE | Status: FUNCTIONAL
Brand: XIENCE SIERRA™

## 2019-09-24 RX ORDER — FENTANYL CITRATE 50 UG/ML
INJECTION, SOLUTION INTRAMUSCULAR; INTRAVENOUS AS NEEDED
Status: DISCONTINUED | OUTPATIENT
Start: 2019-09-24 | End: 2019-09-24 | Stop reason: HOSPADM

## 2019-09-24 RX ORDER — MIDAZOLAM HYDROCHLORIDE 1 MG/ML
INJECTION, SOLUTION INTRAMUSCULAR; INTRAVENOUS AS NEEDED
Status: DISCONTINUED | OUTPATIENT
Start: 2019-09-24 | End: 2019-09-24 | Stop reason: HOSPADM

## 2019-09-24 RX ORDER — HEPARIN SODIUM 200 [USP'U]/100ML
INJECTION, SOLUTION INTRAVENOUS
Status: DISCONTINUED | OUTPATIENT
Start: 2019-09-24 | End: 2019-09-24

## 2019-09-24 RX ORDER — LIDOCAINE HYDROCHLORIDE 10 MG/ML
INJECTION, SOLUTION EPIDURAL; INFILTRATION; INTRACAUDAL; PERINEURAL AS NEEDED
Status: DISCONTINUED | OUTPATIENT
Start: 2019-09-24 | End: 2019-09-24

## 2019-09-24 RX ORDER — VERAPAMIL HYDROCHLORIDE 2.5 MG/ML
INJECTION, SOLUTION INTRAVENOUS AS NEEDED
Status: DISCONTINUED | OUTPATIENT
Start: 2019-09-24 | End: 2019-09-24

## 2019-09-24 RX ORDER — HEPARIN SODIUM 1000 [USP'U]/ML
INJECTION, SOLUTION INTRAVENOUS; SUBCUTANEOUS AS NEEDED
Status: DISCONTINUED | OUTPATIENT
Start: 2019-09-24 | End: 2019-09-24

## 2019-09-24 RX ORDER — CLOPIDOGREL BISULFATE 75 MG/1
TABLET ORAL AS NEEDED
Status: DISCONTINUED | OUTPATIENT
Start: 2019-09-24 | End: 2019-09-24 | Stop reason: HOSPADM

## 2019-09-24 RX ADMIN — CLOPIDOGREL BISULFATE 75 MG: 75 TABLET ORAL at 09:02

## 2019-09-24 RX ADMIN — ASPIRIN 81 MG: 81 TABLET, COATED ORAL at 09:02

## 2019-09-24 RX ADMIN — Medication 10 ML: at 21:00

## 2019-09-24 RX ADMIN — CHLORTHALIDONE 25 MG: 25 TABLET ORAL at 09:02

## 2019-09-24 RX ADMIN — LEVOTHYROXINE SODIUM 100 MCG: 75 TABLET ORAL at 05:46

## 2019-09-24 RX ADMIN — Medication 10 ML: at 05:45

## 2019-09-24 RX ADMIN — LISINOPRIL: 20 TABLET ORAL at 09:02

## 2019-09-24 RX ADMIN — DONEPEZIL HYDROCHLORIDE 5 MG: 5 TABLET, FILM COATED ORAL at 21:00

## 2019-09-24 RX ADMIN — METOPROLOL SUCCINATE 50 MG: 50 TABLET, EXTENDED RELEASE ORAL at 09:02

## 2019-09-24 RX ADMIN — GLYBURIDE 10 MG: 5 TABLET ORAL at 18:00

## 2019-09-24 NOTE — PROGRESS NOTES
1900 - bedside report from [facility]. Paced. Alert and oriented, forgetful to use CB, steady on feet. Bed alarm on. No complaints. R wrist benign. 0000 - NPO. CHG prep has been done this evening.    0700 - Bedside report to RN. Paced. NPO for staged PCI today.

## 2019-09-24 NOTE — PROGRESS NOTES
[address]  [phone]      Cardiology Progress Note      9/24/2019 10:00 AM    Admit Date: 9/23/2019    Admit Diagnosis:   Chest pain, unspecified type [R07.9]  Angina, class III ([address] [I20.9]    Subjective: Dionisio Snyder has no c/o CP, SOB. Post cardiac cath with identified 3VD. Cardiac surgery felt he was not a good candidate for CABG. Plan for PCI LAD and RCA today      Visit Vitals  BP (!) 123/104   Pulse 78   Temp 97.8 °F (36.6 °C)   Resp 18   Wt 90.7 kg (199 lb 15.3 oz)   SpO2 98%   BMI 27.89 kg/m²       Current Facility-Administered Medications   Medication Dose Route Frequency    influenza vaccine 2019-20 (6 mos+)(PF) (FLUARIX/FLULAVAL/FLUZONE QUAD) injection 0.5 mL  0.5 mL IntraMUSCular PRIOR TO DISCHARGE    clopidogrel (PLAVIX) tablet 75 mg  75 mg Oral DAILY    chlorthalidone (HYGROTEN) tablet 25 mg  25 mg Oral DAILY    donepezil (ARICEPT) tablet 5 mg  5 mg Oral QHS    glyBURIDE (DIABETA) tablet 10 mg  10 mg Oral BID WITH MEALS    levothyroxine (SYNTHROID) tablet 100 mcg  100 mcg Oral 6am    aspirin delayed-release tablet 81 mg  81 mg Oral DAILY    amLODIPine (NORVASC) 10 mg, lisinopril (PRINIVIL, ZESTRIL) 40 mg   Oral DAILY    metoprolol succinate (TOPROL-XL) XL tablet 50 mg  50 mg Oral DAILY    sodium chloride (NS) flush 5-40 mL  5-40 mL IntraVENous Q8H    sodium chloride (NS) flush 5-40 mL  5-40 mL IntraVENous PRN    acetaminophen (TYLENOL) tablet 650 mg  650 mg Oral Q4H PRN    naloxone (NARCAN) injection 0.4 mg  0.4 mg IntraVENous PRN    0.9% sodium chloride infusion  50 mL/hr IntraVENous CONTINUOUS       Objective:      Physical Exam:  General Appearance:  elderly  male in no acute distress  Chest:   Clear  Cardiovascular:  Regular rate and rhythm, no murmur. Abdomen:   Soft, non-tender, bowel sounds are active. Extremities: right radial site D/I, no hematoma  Skin:  Warm and dry.      Data Review:   No results for input(s): WBC, HGB, HCT, PLT, HGBEXT, HCTEXT, PLTEXT, HGBEXT, HCTEXT, PLTEXT in the last 72 hours. Recent Labs     09/24/19  0545   *   K 3.7      CO2 25   *   BUN 19   CREA 1.34*   CA 8.4*       No results for input(s): TROIQ, CPK, CKMB in the last 72 hours. Intake/Output Summary (Last 24 hours) at 9/24/2019 1138  Last data filed at 9/24/2019 1122  Gross per 24 hour   Intake 465 ml   Output 1000 ml   Net -535 ml        Telemetry: SR       Assessment:     Active Problems:    Hypertension (8/27/2019)      Controlled type 2 diabetes mellitus, without long-term current use of insulin ([facility] (8/27/2019)      Angina, class III ([facility] (9/23/2019)      Bilateral carotid artery stenosis (9/23/2019)      Preop cardiovascular exam (9/23/2019)      CKD (chronic kidney disease) (9/24/2019)      S/P cardiac cath (9/24/2019)      Overview: 9/23/19 3VD        Plan:     CAD with 3VD:  PCI of LAD and RCA today  Continue on ASA, plavix, BB statin    HTN:  Overall controlled on BB, Norvasc, ACE, hygroten    CKD:  Overnight hydration with CR improvement  Monitor post procedure      [address] Cardiology    9/24/2019         Patient seen, examined by me personally. Plan discussed as detailed. Agree with note as outlined by  NP. I confirm findings in history and physical exam. No additional findings noted. Agree with plan as outlined above. Images reviewed with Dr. Alexa Cuevas.     Carmel Carmichael MD

## 2019-09-24 NOTE — PROGRESS NOTES
SHEATH PULL NOTE:    Patient informed of procedure with questions answered with review. Sheath site prepped with Chloraprep swab. 6 fr sheath in rfa pulled by SAM Green RN. Hand hold and quick clot, with manual compression to site. No bleeding, no hematoma, no pain at site. Hemostasis obtained with hand hold/manual compression at site. Patient tolerated well. No change in status. Handhold for 15 minutes. No change at site. Occlusive dressing applied to site. No bleeding, no hematoma, no pain/discomfort at site. Groin instructions provided with review. Continue to monitor procedure site and patient status. *Advised patient to keep head flat and extremity flat to decrease risk of bleeding. *Recommended that patient not drink for ONE HOUR post sheath pull completion. *Recommended that patient not eat for TWO HOURS post sheath pull completion. *Instructed patient on rationale for delay of PO products to decrease risk for aspiration and if additional treatment to procedure site is required. Patient verbalized understanding of instructions with review.

## 2019-09-24 NOTE — CONSULTS
Cardiothoracic Surgery Consult      Subjective:      Chief Complaint: Chen Mckeon is a 80 y.o. dementia on medications, hypertensive, type 2 diabetic, non smoking, SSS with PPM, PAD s/p stents  male who was referred for opinion and advice regarding coronary revascularization by Dr. Angela Linda / Viki Camacho MD.     Daughter describes severe exertional dyspynea. with mild effort associated with nausea and diaphoresis. Patient states he feels fine, cuts his grass, denies SOB. Cardiac Testing:     Echocardiogram films were personally reviewed  Left Ventricle Normal systolic function (ejection fraction normal). Small left ventricle. Mild concentric hypertrophy. Sigmoid septum. The estimated ejection fraction is 51 - 55%. No regional wall motion abnormality noted. There is mild (grade 1) left ventricular diastolic dysfunction. Left Atrium Normal cavity size. Right Ventricle Normal cavity size. Pacing wire present . Right Atrium Normal cavity size. Aortic Valve Trileaflet valve structure and no regurgitation. There is mild leaflet calcification with reduced excursion. There is mild aortic stenosis. Mitral Valve No stenosis and no regurgitation. Leaflet calcification. There is mild anterior leaflet calcification at the base. There is mild posterior leaflet calcification at the base. Tricuspid Valve Normal valve structure and no stenosis. Mild tricuspid valve regurgitation. Pulmonary arterial systolic pressure is 60.8 mmHg. Pulmonic Valve Pulmonic valve not well visualized. No stenosis. Mild regurgitation. Aorta Normal aortic root. Pericardium No evidence of pericardial effusion. Cardiac catheretization films were personally reviewed  Left Main   Dist LM lesion 50% stenosed. .   Left Anterior Descending   Prox LAD lesion 95% stenosed. . The lesion is ulcerative. Mid LAD lesion 60% stenosed. .   Left Circumflex   Mid Cx lesion 90% stenosed.  .   First Obtuse [place] 1st Mrg lesion 90% stenosed. .   Right Coronary Artery   Prox RCA lesion 50% stenosed. .   Mid RCA lesion 90% stenosed. .       Past Medical History:   Diagnosis Date    Diabetes ([address]     Essential hypertension     Thyroid disease      Past Surgical History:   Procedure Laterality Date    HX PACEMAKER        Social History     Tobacco Use    Smoking status: Former Smoker     Last attempt to quit: 1979     Years since quittin.1    Smokeless tobacco: Never Used   Substance Use Topics    Alcohol use: Not Currently      Family History   Problem Relation Age of Onset    Diabetes Mother     Heart Surgery Father     No Known Problems Sister     No Known Problems Brother     No Known Problems Sister     No Known Problems Sister      Prior to Admission medications    Medication Sig Start Date End Date Taking? Authorizing Provider   aspirin delayed-release 81 mg tablet Take  by mouth daily. Yes Provider, Historical   amLODIPine-benazepril (LOTREL) 10-40 mg per capsule Take 1 Cap by mouth daily. 19  Yes Provider, Historical   clopidogrel (PLAVIX) 75 mg tab  19  Yes Provider, Historical   chlorthalidone (HYGROTEN) 25 mg tablet Take 25 mg by mouth daily. 19  Yes Provider, Historical   donepezil (ARICEPT) 5 mg tablet Take 5 mg by mouth nightly. 19  Yes Provider, Historical   glyBURIDE (DIABETA) 5 mg tablet Take 10 mg by mouth two (2) times daily (with meals). 19  Yes Provider, Historical   levothyroxine (SYNTHROID) 100 mcg tablet Take 100 mcg by mouth Daily (before breakfast).  19  Yes Provider, Historical   metoprolol succinate (TOPROL-XL) 50 mg XL tablet  19  Yes Provider, Historical       No Known Allergies      Recreational drug use:NO    Prieto status or cause of death in parents and siblings if  Heart problems, stroke, or vascular disease in family members : NO    HISTORY OF NON COMPLIANCE WITH MEDICINES:  NO    REVIEW OF SYSTEMS:     [] Unable to obtain  ROS due to []mental status change  []sedated   []intubated   [x]Total of 13 systems reviewed as follows:  Constitutional: negative fever, negative chills  Eyes:   negative for amauroses fugax  ENT:   negative sore throat,oral absecess  Endocrine Negative for thyroid replacement Rx; goiter; DM  Respiratory:  CORDOVA, nausea and vomiting  Cards:  negative for  palpitations, lower extremity edema, varicosities, Leg stents  GI:   negative for dysphagia, bleeding, nausea, vomiting, diarrhea, and     abdominal pain  Genitourinary: negative for frequency, dysuria, BPH in men. Integument:  negative for rash and pruritus  Hematologic:  negative for easy bruising; bleeding dyscarsia  Musculoskel: negative for muscle weakness or implants inhibiting ambulation  Neurological:  negative for stroke, TIA, syncope, dizziness  Behavl/Psy Dementia on medication      Objective:     Visit Vitals  /82   Pulse 94   Temp 98.1 °F (36.7 °C)   Resp 17   Wt 199 lb 15.3 oz (90.7 kg)   SpO2 97%   BMI 27.89 kg/m²       EXAM:  General:  Alert, cooperative, no distress   Mouth/Throat: Teeth and gums normal.  Chest: No lesions, surgical scars or pectus. Neck: Supple, symmetrical, trachea midline, no adenopathy, no thyroid enlargement/tenderness/nodules, no carotid bruit and no JVD. Lungs:   Clear to auscultation bilaterally. Heart:  Regular rate and rhythm, S1, S2 normal, no murmur, no click, no rub and no gallop. Abdomen:   Soft, non-tender. Bowel sounds normal. No masses,  No organomegaly. Extremities: Extremities normal, atraumatic, no cyanosis or edema. Pulses: 1+ and symmetric all extremities. Skin:  No rashes or lesions       Neurologic:  Gross motor and sensory apparatus intact.      Labs:   Recent Labs     09/24/19  0803 09/24/19  0545   NA  --  135*   K  --  3.7   BUN  --  19   CREA  --  1.34*   GLU  --  145*   GLUCPOC 162*  --        DIAGNOSTICS:    Assessment:     Active Problems:    Angina, class III ([facility] (9/23/2019)      Bilateral carotid artery stenosis (9/23/2019)      Preop cardiovascular exam (9/23/2019)    PAD s/p stents on PLAVIX    STS Risk Calculator:  Procedure: Isolated CAB CALCULATE   Risk of Mortality:  2.118%    Renal Failure:  2.199%    Permanent Stroke:  1.904%    Prolonged Ventilation:  5.448%    DSW Infection:  0.202%    Reoperation:  2.365%    Morbidity or Mortality:  9.935%    Short Length of Stay:  30.002%    Long Length of Stay:  4.949%         Plan:     PCI is a reasonable treatment option     Signed By: IBETH Taylor     September 24, 2019       ctcons

## 2019-09-25 VITALS
RESPIRATION RATE: 14 BRPM | SYSTOLIC BLOOD PRESSURE: 120 MMHG | WEIGHT: 199.96 LBS | TEMPERATURE: 97.7 F | BODY MASS INDEX: 27.89 KG/M2 | HEART RATE: 84 BPM | DIASTOLIC BLOOD PRESSURE: 63 MMHG | OXYGEN SATURATION: 94 %

## 2019-09-25 PROBLEM — Z95.5 S/P CORONARY ARTERY STENT PLACEMENT: Status: ACTIVE | Noted: 2019-09-25

## 2019-09-25 LAB
ANION GAP SERPL CALC-SCNC: 8 MMOL/L (ref 5–15)
ATRIAL RATE: 77 BPM
BUN SERPL-MCNC: 16 MG/DL (ref 6–20)
BUN/CREAT SERPL: 11 (ref 12–20)
CALCIUM SERPL-MCNC: 8.7 MG/DL (ref 8.5–10.1)
CALCULATED P AXIS, ECG09: 30 DEGREES
CALCULATED R AXIS, ECG10: -110 DEGREES
CALCULATED T AXIS, ECG11: 13 DEGREES
CHLORIDE SERPL-SCNC: 102 MMOL/L (ref 97–108)
CO2 SERPL-SCNC: 22 MMOL/L (ref 21–32)
CREAT SERPL-MCNC: 1.4 MG/DL (ref 0.7–1.3)
DIAGNOSIS, 93000: NORMAL
GLUCOSE BLD STRIP.AUTO-MCNC: 175 MG/DL (ref 65–100)
GLUCOSE SERPL-MCNC: 166 MG/DL (ref 65–100)
P-R INTERVAL, ECG05: 322 MS
POTASSIUM SERPL-SCNC: 4 MMOL/L (ref 3.5–5.1)
Q-T INTERVAL, ECG07: 458 MS
QRS DURATION, ECG06: 158 MS
QTC CALCULATION (BEZET), ECG08: 518 MS
SERVICE CMNT-IMP: ABNORMAL
SODIUM SERPL-SCNC: 132 MMOL/L (ref 136–145)
VENTRICULAR RATE, ECG03: 77 BPM

## 2019-09-25 PROCEDURE — 65660000000 HC RM CCU STEPDOWN

## 2019-09-25 PROCEDURE — 90471 IMMUNIZATION ADMIN: CPT

## 2019-09-25 PROCEDURE — 74011250636 HC RX REV CODE- 250/636: Performed by: NURSE PRACTITIONER

## 2019-09-25 PROCEDURE — 80048 BASIC METABOLIC PNL TOTAL CA: CPT

## 2019-09-25 PROCEDURE — 36415 COLL VENOUS BLD VENIPUNCTURE: CPT

## 2019-09-25 PROCEDURE — 77030029065 HC DRSG HEMO QCLOT ZMED -B

## 2019-09-25 PROCEDURE — 90686 IIV4 VACC NO PRSV 0.5 ML IM: CPT | Performed by: NURSE PRACTITIONER

## 2019-09-25 PROCEDURE — 99218 HC RM OBSERVATION: CPT

## 2019-09-25 PROCEDURE — 82962 GLUCOSE BLOOD TEST: CPT

## 2019-09-25 PROCEDURE — 74011250637 HC RX REV CODE- 250/637: Performed by: NURSE PRACTITIONER

## 2019-09-25 RX ORDER — ATORVASTATIN CALCIUM 40 MG/1
40 TABLET, FILM COATED ORAL DAILY
Qty: 30 TAB | Refills: 11 | Status: SHIPPED | OUTPATIENT
Start: 2019-09-25 | End: 2019-10-11 | Stop reason: SDUPTHER

## 2019-09-25 RX ORDER — CLOPIDOGREL BISULFATE 75 MG/1
75 TABLET ORAL DAILY
Qty: 30 TAB | Refills: 11 | Status: SHIPPED | OUTPATIENT
Start: 2019-09-25 | End: 2019-10-11 | Stop reason: SDUPTHER

## 2019-09-25 RX ADMIN — ASPIRIN 81 MG: 81 TABLET, COATED ORAL at 08:33

## 2019-09-25 RX ADMIN — CLOPIDOGREL BISULFATE 75 MG: 75 TABLET ORAL at 08:33

## 2019-09-25 RX ADMIN — LEVOTHYROXINE SODIUM 100 MCG: 75 TABLET ORAL at 05:19

## 2019-09-25 RX ADMIN — INFLUENZA VIRUS VACCINE 0.5 ML: 15; 15; 15; 15 SUSPENSION INTRAMUSCULAR at 11:05

## 2019-09-25 RX ADMIN — METOPROLOL SUCCINATE 50 MG: 50 TABLET, EXTENDED RELEASE ORAL at 08:33

## 2019-09-25 RX ADMIN — LISINOPRIL: 20 TABLET ORAL at 08:33

## 2019-09-25 RX ADMIN — GLYBURIDE 10 MG: 5 TABLET ORAL at 08:33

## 2019-09-25 RX ADMIN — Medication 10 ML: at 05:18

## 2019-09-25 RX ADMIN — CHLORTHALIDONE 25 MG: 25 TABLET ORAL at 08:33

## 2019-09-25 NOTE — PROGRESS NOTES
1900 - Bedside report from RN. Paced. No complaints. Pt ed for post sheath pull activity progression explained to pt. Cath sites benign. 2000 - Pt went to bathroom before I could get to him, said he had to have a bowel movement. Did not use call bell. Returned to bed. Sister just arrived, will be staying tonight. 2300 - VSS, R groin and R ulnar sites benign, +PPP. No complaints. IV infusing. 0700 - Bedside report to RN. Paced.

## 2019-09-25 NOTE — PHYSICIAN ADVISORY
Letter of Status Determination:   Recommend hospitalization status upgraded from   OBSERVATION  to INPATIENT  Status     Pt Name:  Alexus Montano   MR#   [address] [facility] # [id number] /  [id number]  Payor: Patty Thibodeaux / Plan: Martha Artis / Product Type: Medicare /    [facility]#  [id number]   Room and Hospital  2160/01  @ [facility]   Hospitalization date  9/23/2019  9:24 AM   Current Attending Physician  Gonzalez Acosta diagnosis  Chest pain, unspecified type [R07.9]  Angina, class III ([address] [I20.9]     Clinicals  80 y.o. y.o  male hospitalized with above diagnosis   He went through cardiac cath showing significant CAD   This pt is being evaluated for coronary artery bypass surgery. [facility] ([facility]) criteria   Does  NOT apply    STATUS DETERMINATION  This patient is at above high risk of deterioration based on documented presenting clinical data, comorbid conditions, high risk of adverse events and current acute care course. Mr. Alexus Montano now meets Inpatient Admission status criteria in accordance with CMS regulation Section 43 .3. Specifically, due to medical necessity the patient's stay now exceeds Two Midnights. It is our recommendation that this patient's hospitalization status should be upgraded from  OBSERVATION to INPATIENT status. The final decision of the patient's hospitalization status depends on the attending physician's judgment              Additional comments     Payor: VA MEDICARE / Plan: VA MEDICARE PART A & B / Product Type: Medicare /     The information in this document is a recommendation to be used for utilization review and utilization management purposes only. This recommendation is not an order.    The recommendation is made based on the information reviewed at the time of the referral, is pursuant to the [facility] Conditions of Participation (42 CFR Part 482), and is neither a judgment nor an assessment with regard to the appropriateness or quality of clinical care. For all Managed Care patients: The Criteria are intended solely for use as screening guidelines with respect to the medical appropriateness of healthcare services and not for final clinical or payment determinations concerning the type or level of medical care provided, or proposed to be provided, to a patient. They help the reviewers determine whether a patient is appropriate for observation or inpatient admission at the time a decision to admit the patient is being made. All efforts are made to apply the pertinent payor criteria (MCG or [employer]) as well as the clinical judgements based on the information reviewed at the time of the referral.\" Nothing in this document may be used to limit, alter, or affect clinical services provided to the patient named below. Inna Austin MD MPH FAC   Cell: [phone]  Physician [address]       Cell  [phone]        [phone]    .

## 2019-09-25 NOTE — CARDIO/PULMONARY
Cardiac Rehab Note: chart review     S/P PCI/stenting on 9/24/2019    Met with pt and daughter who lives with her dad in [facility]. Smoking history assessed. Patient is a former smoker. EF 43%  on 9/13/2019 per nuc stress    CAD education folder, with heart heathy diet, warning signs, heart facts, catheterization brochure, and out patient cardiac rehab program provided to Mac French on 9/25/2019                                              Educated using teach back method. Reviewed CAD diagnosis definition and purpose of intervention. Discussed risk factors for CAD to include the following: family history,  hyperlipidemia, hypertension, diabetes, and stress. Discussed Heart Healthy/Low Sodium (less than 2000 mg) diet. Daughter states pt still likes his sweets but does check his blood sugar. Encouraged to try sugar free sweets. Reviewed the importance of medication compliance, follow up appointments with cardiologist, signs and symptoms of angina, and what to report to physician after discharge. Daughter makes sure her dad takes his medications properly. Emphasized the value of cardiac rehab. Discussed Cardiac Rehab Program format, benefits, and encouraged enrollment to assist with risk modification and management. Pt and daughter declined Cardiac Rehab as she stated she feels his \"senility\" is worsening and he would not be able to participate. She plans to have him do inside stretches and walk his 2 acre land when weather cools down    Mac French and daughter verbalized understanding with questions answered.   Sridhar Olvera RN

## 2019-09-25 NOTE — PROGRESS NOTES
FIOR:    * Home with f/u appts & family support  > CM secured new-pt PCP f/u appt with Dr. Mikaela Schulz    CM was informed of pt's status change from \"observation\" to \"in-patient\" at 10:58 AM. CM sent request to CM specialist to secure f/u appt with pt's PCP.    11:33 AM: CM was notified pt is no longer a pt at Dr. Rosa Ramirez office; CM unable to secure f/u appt with listed PCP. CM will conduct room visit to relay update and inquire if pt would like CM to establish a new PCP. Pt's daughter Liudmila Amanda) will be providing transportation at d/c.    11:44 AM: CM conducted room visit to discuss [place] plan and provide update in regards to pt's PCP. Pt's daughter was present in room and stated they \"weren't happy at all\" with services provided by listed PCP, Dr. Rosalba Wolfe, and would like to secure new PCP for him. CM sent request to CM specialist to secure new-pt PCP appointment. CM informed pt and his daughter of [facility] service per pt's qualifying dx; pt stated he didn't want to utilize the service and \"has good support in place because his daughter lives with him. \"    11:52 AM: CM secured new pt PCP f/u; details regarding date/time reflected in AVS. Details regarding date/time of pt's f/u with / Marita Felix are also reflected in AVS.    No further CM needs identified. CM notified pt's nurse of d/c. Care Management Interventions  PCP Verified by CM: No  Mode of Transport at Discharge: Other (see comment)(pt's daughter Liudmila Amanda) is providing transportation at d/c.)  Transition of Care Consult (CM Consult):  Other(home with f/u appts & family support)  MyChart Signup: No  Discharge Durable Medical Equipment: No  Physical Therapy Consult: No  Occupational Therapy Consult: No  Speech Therapy Consult: No  Current Support Network: Own Home(pt's daughter Liudmila Amanda) lives in pt's home with him)  Confirm Follow Up Transport: Family(pt's daughter Liudmila Amanda) provides transportation)  Plan discussed with Pt/Family/Caregiver: Yes  Discharge Location  Discharge Placement: Home with family assistance(f/u appts & family assistance)    Janell Mathew, [address], [facility]  [phone] No

## 2019-09-25 NOTE — ROUTINE PROCESS
The following appointments have been successfully scheduled:    Date/time Friday, October 11, 2019 11:30 AM  Patient  Raghav Adair 1934 (18RD M) #[id number]  Department [facility] OFFICE [address]  Appointment type New Patient  Provider [facility]

## 2019-09-25 NOTE — DISCHARGE SUMMARY
[address] [place] [address]  [phone]     Cardiology Discharge Summary     Patient ID:  Nan Bullock  [id number]  62 y.o.  1934    Admit Date: 9/23/2019    Discharge Date: 9/25/2019     Admitting Physician: Ashli Pollock MD     Discharge Physician:   ALISSON Anderson NP    Admission Diagnoses:   Chest pain, unspecified type [R07.9]  Angina, class III ([address] [I20.9]  Angina, class III ([address] [I20.9]    Discharge Diagnoses:    Active Problems:    Hypertension (8/27/2019)      Controlled type 2 diabetes mellitus, without long-term current use of insulin ([address] (8/27/2019)      Angina, class III ([address] (9/23/2019)      Bilateral carotid artery stenosis (9/23/2019)      Preop cardiovascular exam (9/23/2019)      CKD (chronic kidney disease) (9/24/2019)      S/P cardiac cath (9/24/2019)      Overview: 9/23/19 3VD      S/P coronary artery stent placement (9/25/2019)      Overview: 9/24/19 PCI/JOSE to distal LMT, ,prox LAD and LCX, distal RCA        Discharge Condition: Good    Cardiology Procedures this Admission:  Left heart catheterization with PCI    Patient Active Problem List    Diagnosis Date Noted    S/P coronary artery stent placement 09/25/2019    CKD (chronic kidney disease) 09/24/2019    S/P cardiac cath 09/24/2019    Angina, class III ([address] 09/23/2019    Bilateral carotid artery stenosis 09/23/2019    Preop cardiovascular exam 09/23/2019    Elevated blood pressure affecting pregnancy in first trimester, antepartum 09/18/2019    Hypertension 08/27/2019    Coronary artery disease involving native coronary artery of native heart 08/27/2019    Controlled type 2 diabetes mellitus, without long-term current use of insulin ([address] 08/27/2019    Pacemaker 08/27/2019      Past Medical History:   Diagnosis Date    CKD (chronic kidney disease) 9/24/2019    Diabetes ([address]     Essential hypertension     S/P cardiac cath 9/24/2019    S/P coronary artery stent placement 2019 PCI/JOSE to distal LMT, ,prox LAD and LCX, distal RCA    Thyroid disease       Social History     Socioeconomic History    Marital status:      Spouse name: Not on file    Number of children: Not on file    Years of education: Not on file    Highest education level: Not on file   Tobacco Use    Smoking status: Former Smoker     Last attempt to quit: 1979     Years since quittin.1    Smokeless tobacco: Never Used   Substance and Sexual Activity    Alcohol use: Not Currently    Drug use: Never     No Known Allergies   Family History   Problem Relation Age of Onset    Diabetes Mother     Heart Surgery Father     No Known Problems Sister     No Known Problems Brother     No Known Problems Sister     No Known Problems Sister         Hospital Course: Mr. Green Mohs was electively admitted for unstable angina class III with a positive nuclear lexiscan stress study for a cardiac cath. He had been experiencing CORDOVA and chest discomfort. CAD with 3VD: Cardiac cath on 19 with 3VD. Patient was not felt to be a candidate for CABG. 19 had multivessel PCI/JOSE's to LMT, LAD, LCx and RCA  Continue on Plavix 75mg daily x 1 year, ASA, BB. Started on Lipitor 40mg daily. PTA on no statin. Lipids were not drawn while inpatient and cannot find previous profile. Recommend follow with lipid profile 4-6 weeks.       HTN:  Overall controlled on BB, Norvasc, ACE, hygroten     CKD:  Overnight hydration with CR improvement  Monitor post procedure    DM  Continue on glyburide     Consults:  Cardiac surgery    Visit Vitals  /63 (BP 1 Location: Left arm, BP Patient Position: At rest)   Pulse 84   Temp 97.7 °F (36.5 °C)   Resp 14   Wt 90.7 kg (199 lb 15.3 oz)   SpO2 94%   BMI 27.89 kg/m²       Physical Exam  General:  eldelry  male in no acute distress  Abdomen: soft, non-tender.  Bowel sounds normal.   Extremities: right radial site D/I, no hematoma; right groin site D/i, no hematoma  Heart: regular rate and rhythm, no murmur, S3/JVD  Lungs: clear to auscultation bilaterally      Labs: No results for input(s): WBC, HGB, HCT, PLT, HGBEXT, HCTEXT, PLTEXT, HGBEXT, HCTEXT, PLTEXT in the last 72 hours. Recent Labs     09/24/19  0545   *   K 3.7      CO2 25   *   BUN 19   CREA 1.34*   CA 8.4*       No results for input(s): TROIQ, CPK, CKMB in the last 72 hours. EKG: paced     Disposition: home    Patient Instructions:   Current Discharge Medication List      START taking these medications    Details   atorvastatin (LIPITOR) 40 mg tablet Take 1 Tab by mouth daily. Qty: 30 Tab, Refills: 11         CONTINUE these medications which have CHANGED    Details   clopidogrel (PLAVIX) 75 mg tab Take 1 Tab by mouth daily. Qty: 30 Tab, Refills: 11         CONTINUE these medications which have NOT CHANGED    Details   aspirin delayed-release 81 mg tablet Take  by mouth daily. amLODIPine-benazepril (LOTREL) 10-40 mg per capsule Take 1 Cap by mouth daily. chlorthalidone (HYGROTEN) 25 mg tablet Take 25 mg by mouth daily. donepezil (ARICEPT) 5 mg tablet Take 5 mg by mouth nightly. glyBURIDE (DIABETA) 5 mg tablet Take 10 mg by mouth two (2) times daily (with meals). levothyroxine (SYNTHROID) 100 mcg tablet Take 100 mcg by mouth Daily (before breakfast). metoprolol succinate (TOPROL-XL) 50 mg XL tablet              Referenced discharge instructions provided by nursing for diet and activity. Follow up with Dr. Angela Linda 1 week. Signed:  Jacob Coates NP  9/25/2019  10:51 AM      [address] [facility] Cardiology    9/25/2019         Patient seen, examined by me personally. Plan discussed as detailed. Agree with note as outlined by  NP. I confirm findings in history and physical exam. No additional findings noted. Agree with plan as outlined above.      Ladan Mi MD

## 2019-09-25 NOTE — DISCHARGE INSTRUCTIONS
[address]  [phone]      [facility] INSTRUCTIONS      Patient ID:  Kenyetta Sanchez  [id number]  47 y.o.  1934    Admit Date: 9/23/2019    Discharge Date: 9/25/2019     Admitting Physician: Karen Srivastava MD     Discharge Physician: Jeff He NP    Admission Diagnoses:   Chest pain, unspecified type [R07.9]  Angina, class III ([address] [I20.9]  Angina, class III ([address] [I20.9]    Discharge Diagnoses: Active Problems:    Hypertension (8/27/2019)      Controlled type 2 diabetes mellitus, without long-term current use of insulin ([address] (8/27/2019)      Angina, class III ([address] (9/23/2019)      Bilateral carotid artery stenosis (9/23/2019)      Preop cardiovascular exam (9/23/2019)      CKD (chronic kidney disease) (9/24/2019)      S/P cardiac cath (9/24/2019)      Overview: 9/23/19 3VD      S/P coronary artery stent placement (9/25/2019)      Overview: 9/24/19 PCI/JOSE to distal LMT, ,prox LAD and LCX, distal RCA        Discharge Condition: Good    Cardiology Procedures this Admission:  Left heart catheterization with PCI    Disposition: home      Reference discharge instructions provided by nursing for diet and activity. Signed:  Jeff He NP  9/25/2019  11:22 AM    CARDIAC CATHETERIZATION/PCI DISCHARGE INSTRUCTIONS    It is normal to feel tired the first couple days. Take it easy and follow the physicians instructions. CHECK THE CATHETER INSERTION SITE DAILY:    You may shower 24 hours after the procedure, remove the bandage during showering. Wash with soap and water and pat dry. Gentle cleaning of the site with soap and water is sufficient, cover with a dry clean dressing or bandage. Do not apply creams or powders to the area. Do not sit in a bathtub or pool of water for 7 days or until wound has completely healed. Temporary bruising and discomfort is normal and may last a few weeks.   You may have a formation of a small lump at the site which may last up to 6 weeks. CALL THE PHYSICIANS:    If the site becomes red, swollen or feels warm to the touch  If there is bleeding or drainage or if there is unusual pain at the groin or down the leg. If there is any bleeding, lie down, apply pressure or have someone apply pressure with a clean cloth until the bleeding stops. If the bleeding continues, call 911 to be transported to the hospital.  DO NOT DRIVE YOURSELF, [id number] ACTIVITY:    For the first 24-48 hours or as instructed by the physician:  No lifting, pushing or pulling over 10 pounds and no straining the insertion site. Do not life grocery bags or the garbage can, do not run the vacuum  or  for 7 days. Start with short walks as in the hospital and gradually increase as tolerated each day. It is recommended to walk 30 minutes 5-7 days per week. Follow your physicians instructions on activity. Avoid walking outside in extremes of heat or cold. Walk inside when it is cold and windy or hot and humid. Things to keep in mind:  No driving for at least 24 hours, or as designated by your physician. Limit the number of times you go up and down the stairs  Take rests and pace yourself with activity. Be careful and do not strain with bowel movements. MEDICATIONS:    Take all medications as prescribed  Call your physician if you have any questions  Keep an updated list of your medications with you at all times and give a list to your physician and pharmacist        SIGNS AND SYMPTOMS:    Be cautious of symptoms of angina or recurrent symptoms such as chest discomfort, unusual shortness of breath or fatigue. These could be symptoms of restenosis, a new blockage or a heart attack. If your symptoms are relieved with rest it is still recommended that you notify your physician of recurrent chest pain or discomfort.   FOR CHEST PAIN or symptoms of angina not relieved with rest:  If the discomfort is not relieved with rest, and you have been prescribed Nitroglycerin, take as directed (taken under the tongue, one at a time 5 minutes apart for a total of 3 doses). If the discomfort is not relieved after the 3rd nitroglycerin, call 911. If you have not been prescribed Nitroglycerin  and your chest discomfort is not relieved with rest, call 911. AFTER CARE:    Follow up with your physician as instructed. Follow a heart healthy diet with proper portion control, daily stress management, daily exercise, blood pressure and cholesterol control , and smoking cessation.

## 2019-10-01 ENCOUNTER — OFFICE VISIT (OUTPATIENT)
Dept: CARDIOLOGY CLINIC | Age: 84
End: 2019-10-01

## 2019-10-01 VITALS
SYSTOLIC BLOOD PRESSURE: 118 MMHG | OXYGEN SATURATION: 98 % | DIASTOLIC BLOOD PRESSURE: 68 MMHG | RESPIRATION RATE: 16 BRPM | BODY MASS INDEX: 25.45 KG/M2 | HEART RATE: 78 BPM | HEIGHT: 71 IN | WEIGHT: 181.8 LBS

## 2019-10-01 DIAGNOSIS — E78.2 MIXED HYPERLIPIDEMIA: ICD-10-CM

## 2019-10-01 DIAGNOSIS — I25.10 CORONARY ARTERY DISEASE INVOLVING NATIVE CORONARY ARTERY OF NATIVE HEART WITHOUT ANGINA PECTORIS: Primary | ICD-10-CM

## 2019-10-01 DIAGNOSIS — I10 ESSENTIAL HYPERTENSION: ICD-10-CM

## 2019-10-01 DIAGNOSIS — E11.9 CONTROLLED TYPE 2 DIABETES MELLITUS WITHOUT COMPLICATION, WITHOUT LONG-TERM CURRENT USE OF INSULIN (HCC): ICD-10-CM

## 2019-10-01 NOTE — PROGRESS NOTES
NAME:  Memo Jackman   :   1934   MRN:   [id number]   PCP:  Keller Sever, MD           Subjective: The patient is a 80y.o. year old male  who returns for a routine follow-up. Since the last visit, patient reports no change in exercise tolerance, chest pain, edema, medication intolerance, palpitations, shortness of breath, PND/orthopnea wheezing, sputum, syncope, dizziness or light headedness. Doing well. Past Medical History:   Diagnosis Date    CKD (chronic kidney disease) 2019    Diabetes ([address]     Essential hypertension     S/P cardiac cath 2019    S/P coronary artery stent placement 2019 PCI/JOSE to distal LMT, ,prox LAD and LCX, distal RCA    Thyroid disease         ICD-10-CM ICD-9-CM    1. Coronary artery disease involving native coronary artery of native heart without angina pectoris I25.10 414.01 AMB POC EKG ROUTINE W/ 12 LEADS, INTER & REP   2. Essential hypertension I10 401.9    3. Mixed hyperlipidemia E78.2 272.2    4. Controlled type 2 diabetes mellitus without complication, without long-term current use of insulin (HCC) E11.9 250.00       Social History     Tobacco Use    Smoking status: Former Smoker     Last attempt to quit: 1979     Years since quittin.1    Smokeless tobacco: Never Used   Substance Use Topics    Alcohol use: Not Currently      Family History   Problem Relation Age of Onset    Diabetes Mother     Heart Surgery Father     No Known Problems Sister     No Known Problems Brother     No Known Problems Sister     No Known Problems Sister         Review of Systems  General: Pt denies excessive weight gain or loss. Pt is able to conduct ADL's  HEENT: Denies blurred vision, headaches, epistaxis and difficulty swallowing. Respiratory: Denies shortness of breath, CORDOVA, wheezing or stridor.   Cardiovascular: Denies precordial pain, palpitations, edema or PND  Gastrointestinal: Denies poor appetite, indigestion, abdominal pain or blood in stool  Musculoskeletal: Denies pain or swelling from muscles or joints  Neurologic: Denies tremor, paresthesias, or sensory motor disturbance  Skin: Denies rash, itching or texture change. Objective:       Vitals:    10/01/19 0935 10/01/19 0943   BP: 120/70 118/68   Pulse: 78    Resp: 16    SpO2: 98%    Weight: 181 lb 12.8 oz (82.5 kg)    Height: 5' 11\" (1.803 m)     Body mass index is 25.36 kg/m². General PE  Mental Status - Alert. General Appearance - Not in acute distress. Chest and Lung Exam   Inspection: Accessory muscles - No use of accessory muscles in breathing. Auscultation:   Breath sounds: - Normal.    Cardiovascular   Inspection: Jugular vein - Bilateral - Inspection Normal.  Palpation/Percussion:   Apical Impulse: - Normal.  Auscultation: Rhythm - Regular. Heart Sounds - S1 WNL and S2 WNL. No S3 or S4. Murmurs & Other Heart Sounds: Auscultation of the heart reveals - No Murmurs. Peripheral Vascular   Upper Extremity: Inspection - Bilateral - No Cyanotic nailbeds or Digital clubbing. Lower Extremity:   Palpation: Edema - Bilateral - No edema. Data Review:     EKG -EKG: normal EKG, normal sinus rhythm, unchanged from previous tracings. Medications reviewed  Current Outpatient Medications   Medication Sig    clopidogrel (PLAVIX) 75 mg tab Take 1 Tab by mouth daily.  atorvastatin (LIPITOR) 40 mg tablet Take 1 Tab by mouth daily.  aspirin delayed-release 81 mg tablet Take  by mouth daily.  amLODIPine-benazepril (LOTREL) 10-40 mg per capsule Take 1 Cap by mouth daily.  chlorthalidone (HYGROTEN) 25 mg tablet Take 25 mg by mouth daily.  donepezil (ARICEPT) 5 mg tablet Take 5 mg by mouth nightly.  glyBURIDE (DIABETA) 5 mg tablet Take 10 mg by mouth two (2) times daily (with meals).  levothyroxine (SYNTHROID) 100 mcg tablet Take 100 mcg by mouth Daily (before breakfast).     metoprolol succinate (TOPROL-XL) 50 mg XL tablet      No current facility-administered medications for this visit. Assessment:       ICD-10-CM ICD-9-CM    1. Coronary artery disease involving native coronary artery of native heart without angina pectoris I25.10 414.01 AMB POC EKG ROUTINE W/ 12 LEADS, INTER & REP   2. Essential hypertension I10 401.9    3. Mixed hyperlipidemia E78.2 272.2    4. Controlled type 2 diabetes mellitus without complication, without long-term current use of insulin (HCC) E11.9 250.00         Plan:     Patient presents doing well and stable from cardiac standpoint. Tolerating medical therapy. Advised to increase physical activity. Continue current care and follow up in 3 months.     Kobe Harden MD

## 2019-10-11 ENCOUNTER — OFFICE VISIT (OUTPATIENT)
Dept: FAMILY MEDICINE CLINIC | Age: 84
End: 2019-10-11

## 2019-10-11 ENCOUNTER — HOSPITAL ENCOUNTER (OUTPATIENT)
Dept: LAB | Age: 84
Discharge: HOME | End: 2019-10-11
Payer: MEDICARE

## 2019-10-11 VITALS
BODY MASS INDEX: 25.34 KG/M2 | RESPIRATION RATE: 20 BRPM | OXYGEN SATURATION: 95 % | SYSTOLIC BLOOD PRESSURE: 104 MMHG | HEIGHT: 71 IN | HEART RATE: 78 BPM | TEMPERATURE: 96.1 F | WEIGHT: 181 LBS | DIASTOLIC BLOOD PRESSURE: 53 MMHG

## 2019-10-11 DIAGNOSIS — Z95.0 PACEMAKER: ICD-10-CM

## 2019-10-11 DIAGNOSIS — E03.9 ACQUIRED HYPOTHYROIDISM: ICD-10-CM

## 2019-10-11 DIAGNOSIS — E11.21 CONTROLLED TYPE 2 DIABETES MELLITUS WITH DIABETIC NEPHROPATHY, WITHOUT LONG-TERM CURRENT USE OF INSULIN (HCC): ICD-10-CM

## 2019-10-11 DIAGNOSIS — E78.00 HYPERCHOLESTEROLEMIA: ICD-10-CM

## 2019-10-11 DIAGNOSIS — Z00.00 ENCOUNTER FOR MEDICARE ANNUAL WELLNESS EXAM: Primary | ICD-10-CM

## 2019-10-11 DIAGNOSIS — Z95.5 S/P CORONARY ARTERY STENT PLACEMENT: ICD-10-CM

## 2019-10-11 DIAGNOSIS — R35.0 FREQUENCY OF URINATION: ICD-10-CM

## 2019-10-11 DIAGNOSIS — E55.9 VITAMIN D DEFICIENCY: ICD-10-CM

## 2019-10-11 DIAGNOSIS — Z23 ENCOUNTER FOR IMMUNIZATION: ICD-10-CM

## 2019-10-11 DIAGNOSIS — I10 HYPERTENSION, WELL CONTROLLED: ICD-10-CM

## 2019-10-11 DIAGNOSIS — F03.90 DEMENTIA WITHOUT BEHAVIORAL DISTURBANCE, UNSPECIFIED DEMENTIA TYPE: ICD-10-CM

## 2019-10-11 DIAGNOSIS — Z12.5 SCREENING FOR PROSTATE CANCER: ICD-10-CM

## 2019-10-11 PROCEDURE — 36415 COLL VENOUS BLD VENIPUNCTURE: CPT

## 2019-10-11 PROCEDURE — 85025 COMPLETE CBC W/AUTO DIFF WBC: CPT

## 2019-10-11 PROCEDURE — 80061 LIPID PANEL: CPT

## 2019-10-11 PROCEDURE — 83036 HEMOGLOBIN GLYCOSYLATED A1C: CPT

## 2019-10-11 PROCEDURE — 80053 COMPREHEN METABOLIC PANEL: CPT

## 2019-10-11 PROCEDURE — 84443 ASSAY THYROID STIM HORMONE: CPT

## 2019-10-11 PROCEDURE — 82306 VITAMIN D 25 HYDROXY: CPT

## 2019-10-11 PROCEDURE — 81001 URINALYSIS AUTO W/SCOPE: CPT

## 2019-10-11 PROCEDURE — 82043 UR ALBUMIN QUANTITATIVE: CPT

## 2019-10-11 PROCEDURE — 84153 ASSAY OF PSA TOTAL: CPT

## 2019-10-11 RX ORDER — DONEPEZIL HYDROCHLORIDE 5 MG/1
5 TABLET, FILM COATED ORAL
Qty: 90 TAB | Refills: 1 | Status: SHIPPED | OUTPATIENT
Start: 2019-10-11 | End: 2020-02-11 | Stop reason: SDUPTHER

## 2019-10-11 RX ORDER — CLOPIDOGREL BISULFATE 75 MG/1
75 TABLET ORAL DAILY
Qty: 90 TAB | Refills: 1 | Status: SHIPPED | OUTPATIENT
Start: 2019-10-11 | End: 2020-02-11 | Stop reason: SDUPTHER

## 2019-10-11 RX ORDER — CHLORTHALIDONE 25 MG/1
25 TABLET ORAL DAILY
Qty: 90 TAB | Refills: 1 | Status: SHIPPED | OUTPATIENT
Start: 2019-10-11 | End: 2020-02-11 | Stop reason: SDUPTHER

## 2019-10-11 RX ORDER — METOPROLOL SUCCINATE 50 MG/1
50 TABLET, EXTENDED RELEASE ORAL DAILY
Qty: 90 TAB | Refills: 1 | Status: SHIPPED | OUTPATIENT
Start: 2019-10-11 | End: 2020-02-11 | Stop reason: SDUPTHER

## 2019-10-11 RX ORDER — GLYBURIDE 5 MG/1
10 TABLET ORAL 2 TIMES DAILY WITH MEALS
Qty: 90 TAB | Refills: 1 | Status: SHIPPED | OUTPATIENT
Start: 2019-10-11 | End: 2019-10-11 | Stop reason: SDUPTHER

## 2019-10-11 RX ORDER — LEVOTHYROXINE SODIUM 100 UG/1
100 TABLET ORAL
Qty: 90 TAB | Refills: 1 | Status: SHIPPED | OUTPATIENT
Start: 2019-10-11 | End: 2020-02-11 | Stop reason: SDUPTHER

## 2019-10-11 RX ORDER — ATORVASTATIN CALCIUM 40 MG/1
40 TABLET, FILM COATED ORAL DAILY
Qty: 90 TAB | Refills: 1 | Status: SHIPPED | OUTPATIENT
Start: 2019-10-11 | End: 2020-02-11 | Stop reason: SDUPTHER

## 2019-10-11 RX ORDER — GLYBURIDE 5 MG/1
10 TABLET ORAL 2 TIMES DAILY WITH MEALS
Qty: 360 TAB | Refills: 1 | Status: SHIPPED | OUTPATIENT
Start: 2019-10-11 | End: 2020-02-11 | Stop reason: SDUPTHER

## 2019-10-11 RX ORDER — AMLODIPINE BESYLATE AND BENAZEPRIL HYDROCHLORIDE 10; 40 MG/1; MG/1
1 CAPSULE ORAL DAILY
Qty: 90 CAP | Refills: 1 | Status: SHIPPED | OUTPATIENT
Start: 2019-10-11 | End: 2020-02-11 | Stop reason: SDUPTHER

## 2019-10-11 NOTE — PATIENT INSTRUCTIONS
Well Visit, Over 72: Care Instructions  Your Care Instructions    Physical exams can help you stay healthy. Your doctor has checked your overall health and may have suggested ways to take good care of yourself. He or she also may have recommended tests. At home, you can help prevent illness with healthy eating, regular exercise, and other steps. Follow-up care is a key part of your treatment and safety. Be sure to make and go to all appointments, and call your doctor if you are having problems. It's also a good idea to know your test results and keep a list of the medicines you take. How can you care for yourself at home? · Reach and stay at a healthy weight. This will lower your risk for many problems, such as obesity, diabetes, heart disease, and high blood pressure. · Get at least 30 minutes of exercise on most days of the week. Walking is a good choice. You also may want to do other activities, such as running, swimming, cycling, or playing tennis or team sports. · Do not smoke. Smoking can make health problems worse. If you need help quitting, talk to your doctor about stop-smoking programs and medicines. These can increase your chances of quitting for good. · Protect your skin from too much sun. When you're outdoors from 10 a.m. to 4 p.m., stay in the shade or cover up with clothing and a hat with a wide brim. Wear sunglasses that block UV rays. Even when it's cloudy, put broad-spectrum sunscreen (SPF 30 or higher) on any exposed skin. · See a dentist one or two times a year for checkups and to have your teeth cleaned. · Wear a seat belt in the car. Follow your doctor's advice about when to have certain tests. These tests can spot problems early. For men and women  · Cholesterol. Your doctor will tell you how often to have this done based on your overall health and other things that can increase your risk for heart attack and stroke. · Blood pressure.  Have your blood pressure checked during a routine doctor visit. Your doctor will tell you how often to check your blood pressure based on your age, your blood pressure results, and other factors. · Diabetes. Ask your doctor whether you should have tests for diabetes. · Vision. Experts recommend that you have yearly exams for glaucoma and other age-related eye problems. · Hearing. Tell your doctor if you notice any change in your hearing. You can have tests to find out how well you hear. · Colon cancer tests. Keep having colon cancer tests as your doctor recommends. You can have one of several types of tests. · Heart attack and stroke risk. At least every 4 to 6 years, you should have your risk for heart attack and stroke assessed. Your doctor uses factors such as your age, blood pressure, cholesterol, and whether you smoke or have diabetes to show what your risk for a heart attack or stroke is over the next 10 years. · Osteoporosis. Talk to your doctor about whether you should have a bone density test to find out whether you have thinning bones. Also ask your doctor about whether you should take calcium and vitamin D supplements. For women  · Pap test and pelvic exam. You may no longer need a Pap test. Talk with your doctor about whether to stop or continue to have Pap tests. · Breast exam and mammogram. Ask how often you should have a mammogram, which is an X-ray of your breasts. A mammogram can spot breast cancer before it can be felt and when it is easiest to treat. · Thyroid disease. Talk to your doctor about whether to have your thyroid checked as part of a regular physical exam. Women have an increased chance of a thyroid problem. For men  · Prostate exam. Talk to your doctor about whether you should have a blood test (called a PSA test) for prostate cancer.  Experts recommend that you discuss the benefits and risks of the test with your doctor before you decide whether to have this test. Some experts say that men ages 79 and older no longer need testing. · Abdominal aortic aneurysm. Ask your doctor whether you should have a test to check for an aneurysm. You may need a test if you ever smoked or if your parent, brother, sister, or child has had an aneurysm. When should you call for help? Watch closely for changes in your health, and be sure to contact your doctor if you have any problems or symptoms that concern you. Where can you learn more? Go to http://diego-leonor.info/. Enter J038 in the search box to learn more about \"Well Visit, Over 65: Care Instructions. \"  Current as of: December 13, 2018  Content Version: 12.2  © 9360-6824 [employer]. Care instructions adapted under license by [employer] (which disclaims liability or warranty for this information). If you have questions about a medical condition or this instruction, always ask your healthcare professional. [place] [address] any warranty or liability for your use of this information.

## 2019-10-11 NOTE — PROGRESS NOTES
Chief Complaint   Patient presents with    New Patient     HPI:  Bobbi Pathak is a 80 y.o.  male with h/o diabetes type 2, CKD, CAD, s/p tnet placement, s/p cardiac cath, bilateral carotid artery stenosis presents to establish care following hospital discharge. Patient's cardiologist is Dr. Nabil Monsalve. Previous pcp is Dr. Mike Corley. He is also due for medicare wellness. This is a Subsequent Medicare Annual Wellness Exam (AWV) (Performed 12 months after IPPE or effective date of Medicare Part B enrollment)  I have reviewed the patient's medical history in detail and updated the computerized patient record. History     Past Medical History:   Diagnosis Date    CAD (coronary artery disease)     CKD (chronic kidney disease) 9/24/2019    Diabetes ([address]     Essential hypertension     Hypercholesterolemia     S/P cardiac cath 9/24/2019    S/P coronary artery stent placement 9/25/2019 9/24/19 PCI/JOSE to distal LMT, ,prox LAD and LCX, distal RCA    Thyroid disease       Past Surgical History:   Procedure Laterality Date    HX PACEMAKER       Current Outpatient Medications   Medication Sig Dispense Refill    clopidogrel (PLAVIX) 75 mg tab Take 1 Tab by mouth daily. 30 Tab 11    atorvastatin (LIPITOR) 40 mg tablet Take 1 Tab by mouth daily. 30 Tab 11    aspirin delayed-release 81 mg tablet Take  by mouth daily.  amLODIPine-benazepril (LOTREL) 10-40 mg per capsule Take 1 Cap by mouth daily.  chlorthalidone (HYGROTEN) 25 mg tablet Take 25 mg by mouth daily.  donepezil (ARICEPT) 5 mg tablet Take 5 mg by mouth nightly.  glyBURIDE (DIABETA) 5 mg tablet Take 10 mg by mouth two (2) times daily (with meals).  levothyroxine (SYNTHROID) 100 mcg tablet Take 100 mcg by mouth Daily (before breakfast).       metoprolol succinate (TOPROL-XL) 50 mg XL tablet        No Known Allergies  Family History   Problem Relation Age of Onset    Diabetes Mother     Heart Surgery Father    Isabel Boothe No Known Problems Sister     No Known Problems Brother     No Known Problems Sister     No Known Problems Sister      Social History     Tobacco Use    Smoking status: Former Smoker     Last attempt to quit: 1979     Years since quittin.1    Smokeless tobacco: Never Used   Substance Use Topics    Alcohol use: Not Currently     Patient Active Problem List   Diagnosis Code    Hypertension I10    Coronary artery disease involving native coronary artery of native heart I25.10    Controlled type 2 diabetes mellitus, without long-term current use of insulin ([facility]) E11.9    Pacemaker Z95.0    Elevated blood pressure affecting pregnancy in first trimester, antepartum O16.1    Angina, class III ([facility]) I20.9    Bilateral carotid artery stenosis I65.23    Preop cardiovascular exam Z01.810    CKD (chronic kidney disease) N18.9    S/P cardiac cath Z98.890    S/P coronary artery stent placement Z95.5       Depression Risk Factor Screening:     3 most recent PHQ Screens 10/1/2019   Little interest or pleasure in doing things Not at all   Feeling down, depressed, irritable, or hopeless Not at all   Total Score PHQ 2 0     Alcohol Risk Factor Screening: You do not drink alcohol or very rarely. Functional Ability and Level of Safety:   Hearing Loss  Hearing is good. Activities of Daily Living  The home contains: handrails, grab bars and rugs  Patient needs help with:  preparing meals, laundry, housework and managing medications    Fall Risk  Fall Risk Assessment, last 12 mths 10/1/2019   Able to walk? Yes   Fall in past 12 months?  No       Abuse Screen  Patient is not abused    Cognitive Screening   Evaluation of Cognitive Function:  Has your family/caregiver stated any concerns about your memory: yes  Abnormal    Patient Care Team   Patient Care Team:  Chiquita Martínez MD as PCP - General (Internal Medicine)  Tunde Hurtado MD as Physician (Cardiology)    Assessment/Plan   Education and counseling provided:  Are appropriate based on today's review and evaluation  Diagnoses and all orders for this visit:    Encounter for Medicare annual wellness exam  -     METABOLIC PANEL, COMPREHENSIVE  -     CBC WITH AUTOMATED DIFF    Controlled type 2 diabetes mellitus with diabetic nephropathy, without long-term current use of insulin (HCC)  -     Discontinue: glyBURIDE (DIABETA) 5 mg tablet; Take 2 Tabs by mouth two (2) times daily (with meals). , Normal, Disp-90 Tab, R-1  -     HEMOGLOBIN A1C WITH EAG  -     MICROALBUMIN, UR, RAND W/ MICROALB/CREAT RATIO  -     glyBURIDE (DIABETA) 5 mg tablet; Take 2 Tabs by mouth two (2) times daily (with meals). , Normal, Disp-360 Tab, R-1    Hypertension, well controlled  -     amLODIPine-benazepril (LOTREL) 10-40 mg per capsule; Take 1 Cap by mouth daily. , Normal, Disp-90 Cap, R-1  -     metoprolol succinate (TOPROL-XL) 50 mg XL tablet; Take 1 Tab by mouth daily. , Normal, Disp-90 Tab, R-1  -     chlorthalidone (HYGROTEN) 25 mg tablet; Take 1 Tab by mouth daily. , Normal, Disp-90 Tab, R-1    Encounter for immunization  -     varicella-zoster recombinant, PF, (SHINGRIX, PF,) 50 mcg/0.5 mL susr injection; 0.5 mL by IntraMUSCular route once for 1 dose., Print, Disp-0.5 mL, R-0    Acquired hypothyroidism  -     levothyroxine (SYNTHROID) 100 mcg tablet; Take 1 Tab by mouth Daily (before breakfast). , Normal, Disp-90 Tab, R-1  -     TSH 3RD GENERATION    Dementia without behavioral disturbance, unspecified dementia type (HCC)  -     donepezil (ARICEPT) 5 mg tablet; Take 1 Tab by mouth nightly., Normal, Disp-90 Tab, R-1    Pacemaker  -     clopidogrel (PLAVIX) 75 mg tab; Take 1 Tab by mouth daily. , Normal, Disp-90 Tab, R-1    S/P coronary artery stent placement  -     clopidogrel (PLAVIX) 75 mg tab; Take 1 Tab by mouth daily. , Normal, Disp-90 Tab, R-1  -     atorvastatin (LIPITOR) 40 mg tablet; Take 1 Tab by mouth daily. , Normal, Disp-90 Tab, R-1    Hypercholesterolemia  -     LIPID PANEL    Vitamin D deficiency  -     VITAMIN D, 25 HYDROXY    Frequency of urination  -     URINALYSIS W/ RFLX MICROSCOPIC    Screening for prostate cancer  -     PSA SCREENING (SCREENING)    Other orders  -     MICROSCOPIC EXAMINATION  -     CKD REPORT  -     DIABETES PATIENT EDUCATION      Patient Instructions        Well Visit, Over 72: Care Instructions  Your Care Instructions    Physical exams can help you stay healthy. Your doctor has checked your overall health and may have suggested ways to take good care of yourself. He or she also may have recommended tests. At home, you can help prevent illness with healthy eating, regular exercise, and other steps. Follow-up care is a key part of your treatment and safety. Be sure to make and go to all appointments, and call your doctor if you are having problems. It's also a good idea to know your test results and keep a list of the medicines you take. How can you care for yourself at home? · Reach and stay at a healthy weight. This will lower your risk for many problems, such as obesity, diabetes, heart disease, and high blood pressure. · Get at least 30 minutes of exercise on most days of the week. Walking is a good choice. You also may want to do other activities, such as running, swimming, cycling, or playing tennis or team sports. · Do not smoke. Smoking can make health problems worse. If you need help quitting, talk to your doctor about stop-smoking programs and medicines. These can increase your chances of quitting for good. · Protect your skin from too much sun. When you're outdoors from 10 a.m. to 4 p.m., stay in the shade or cover up with clothing and a hat with a wide brim. Wear sunglasses that block UV rays. Even when it's cloudy, put broad-spectrum sunscreen (SPF 30 or higher) on any exposed skin. · See a dentist one or two times a year for checkups and to have your teeth cleaned. · Wear a seat belt in the car.   Follow your doctor's advice about when to have certain tests. These tests can spot problems early. For men and women  · Cholesterol. Your doctor will tell you how often to have this done based on your overall health and other things that can increase your risk for heart attack and stroke. · Blood pressure. Have your blood pressure checked during a routine doctor visit. Your doctor will tell you how often to check your blood pressure based on your age, your blood pressure results, and other factors. · Diabetes. Ask your doctor whether you should have tests for diabetes. · Vision. Experts recommend that you have yearly exams for glaucoma and other age-related eye problems. · Hearing. Tell your doctor if you notice any change in your hearing. You can have tests to find out how well you hear. · Colon cancer tests. Keep having colon cancer tests as your doctor recommends. You can have one of several types of tests. · Heart attack and stroke risk. At least every 4 to 6 years, you should have your risk for heart attack and stroke assessed. Your doctor uses factors such as your age, blood pressure, cholesterol, and whether you smoke or have diabetes to show what your risk for a heart attack or stroke is over the next 10 years. · Osteoporosis. Talk to your doctor about whether you should have a bone density test to find out whether you have thinning bones. Also ask your doctor about whether you should take calcium and vitamin D supplements. For women  · Pap test and pelvic exam. You may no longer need a Pap test. Talk with your doctor about whether to stop or continue to have Pap tests. · Breast exam and mammogram. Ask how often you should have a mammogram, which is an X-ray of your breasts. A mammogram can spot breast cancer before it can be felt and when it is easiest to treat. · Thyroid disease.  Talk to your doctor about whether to have your thyroid checked as part of a regular physical exam. Women have an increased chance of a thyroid problem. For men  · Prostate exam. Talk to your doctor about whether you should have a blood test (called a PSA test) for prostate cancer. Experts recommend that you discuss the benefits and risks of the test with your doctor before you decide whether to have this test. Some experts say that men ages 79 and older no longer need testing. · Abdominal aortic aneurysm. Ask your doctor whether you should have a test to check for an aneurysm. You may need a test if you ever smoked or if your parent, brother, sister, or child has had an aneurysm. When should you call for help? Watch closely for changes in your health, and be sure to contact your doctor if you have any problems or symptoms that concern you. Where can you learn more? Go to http://diego-leonor.info/. Enter D121 in the search box to learn more about \"Well Visit, Over 65: Care Instructions. \"  Current as of: December 13, 2018  Content Version: 12.2  © 1526-8624 [employer]. Care instructions adapted under license by [employer] (which disclaims liability or warranty for this information). If you have questions about a medical condition or this instruction, always ask your healthcare professional. [place] [address] any warranty or liability for your use of this information. Follow-up and Dispositions    · Return in about 5 weeks (around 11/15/2019) for f/u results.

## 2019-10-12 LAB
25(OH)D3+25(OH)D2 SERPL-MCNC: 22.8 NG/ML (ref 30–100)
ALBUMIN SERPL-MCNC: 4.6 G/DL (ref 3.5–4.7)
ALBUMIN/CREAT UR: 175.1 MG/G CREAT (ref 0–30)
ALBUMIN/GLOB SERPL: 1.7 {RATIO} (ref 1.2–2.2)
ALP SERPL-CCNC: 90 IU/L (ref 39–117)
ALT SERPL-CCNC: 14 IU/L (ref 0–44)
APPEARANCE UR: CLEAR
AST SERPL-CCNC: 18 IU/L (ref 0–40)
BACTERIA #/AREA URNS HPF: ABNORMAL /[HPF]
BASOPHILS # BLD AUTO: 0.1 X10E3/UL (ref 0–0.2)
BASOPHILS NFR BLD AUTO: 1 %
BILIRUB SERPL-MCNC: 0.3 MG/DL (ref 0–1.2)
BILIRUB UR QL STRIP: NEGATIVE
BUN SERPL-MCNC: 30 MG/DL (ref 8–27)
BUN/CREAT SERPL: 16 (ref 10–24)
CALCIUM SERPL-MCNC: 9 MG/DL (ref 8.6–10.2)
CASTS URNS MICRO: ABNORMAL
CASTS URNS QL MICRO: PRESENT /LPF
CHLORIDE SERPL-SCNC: 92 MMOL/L (ref 96–106)
CHOLEST SERPL-MCNC: 86 MG/DL (ref 100–199)
CO2 SERPL-SCNC: 22 MMOL/L (ref 20–29)
COLOR UR: YELLOW
CREAT SERPL-MCNC: 1.88 MG/DL (ref 0.76–1.27)
CREAT UR-MCNC: 182.9 MG/DL
EOSINOPHIL # BLD AUTO: 0.2 X10E3/UL (ref 0–0.4)
EOSINOPHIL NFR BLD AUTO: 2 %
EPI CELLS #/AREA URNS HPF: ABNORMAL /HPF (ref 0–10)
ERYTHROCYTE [DISTWIDTH] IN BLOOD BY AUTOMATED COUNT: 12.7 % (ref 12.3–15.4)
EST. AVERAGE GLUCOSE BLD GHB EST-MCNC: 220 MG/DL
GLOBULIN SER CALC-MCNC: 2.7 G/DL (ref 1.5–4.5)
GLUCOSE SERPL-MCNC: 250 MG/DL (ref 65–99)
GLUCOSE UR QL: ABNORMAL
HBA1C MFR BLD: 9.3 % (ref 4.8–5.6)
HCT VFR BLD AUTO: 38.8 % (ref 37.5–51)
HDLC SERPL-MCNC: 28 MG/DL
HGB BLD-MCNC: 13.1 G/DL (ref 13–17.7)
HGB UR QL STRIP: NEGATIVE
IMM GRANULOCYTES # BLD AUTO: 0.1 X10E3/UL (ref 0–0.1)
IMM GRANULOCYTES NFR BLD AUTO: 1 %
INTERPRETATION: NORMAL
KETONES UR QL STRIP: NEGATIVE
LDLC SERPL CALC-MCNC: 23 MG/DL (ref 0–99)
LEUKOCYTE ESTERASE UR QL STRIP: NEGATIVE
LYMPHOCYTES # BLD AUTO: 1.4 X10E3/UL (ref 0.7–3.1)
LYMPHOCYTES NFR BLD AUTO: 12 %
Lab: NORMAL
MCH RBC QN AUTO: 28.4 PG (ref 26.6–33)
MCHC RBC AUTO-ENTMCNC: 33.8 G/DL (ref 31.5–35.7)
MCV RBC AUTO: 84 FL (ref 79–97)
MICRO URNS: ABNORMAL
MICROALBUMIN UR-MCNC: 320.3 UG/ML
MONOCYTES # BLD AUTO: 1.2 X10E3/UL (ref 0.1–0.9)
MONOCYTES NFR BLD AUTO: 11 %
MUCOUS THREADS URNS QL MICRO: PRESENT
NEUTROPHILS # BLD AUTO: 8.7 X10E3/UL (ref 1.4–7)
NEUTROPHILS NFR BLD AUTO: 73 %
NITRITE UR QL STRIP: NEGATIVE
PH UR STRIP: 5.5 [PH] (ref 5–7.5)
PLATELET # BLD AUTO: 393 X10E3/UL (ref 150–450)
POTASSIUM SERPL-SCNC: 4.7 MMOL/L (ref 3.5–5.2)
PROT SERPL-MCNC: 7.3 G/DL (ref 6–8.5)
PROT UR QL STRIP: ABNORMAL
PSA SERPL-MCNC: 0.5 NG/ML (ref 0–4)
RBC # BLD AUTO: 4.62 X10E6/UL (ref 4.14–5.8)
RBC #/AREA URNS HPF: ABNORMAL /HPF (ref 0–2)
SODIUM SERPL-SCNC: 135 MMOL/L (ref 134–144)
SP GR UR: 1.02 (ref 1–1.03)
TRIGL SERPL-MCNC: 175 MG/DL (ref 0–149)
TSH SERPL DL<=0.005 MIU/L-ACNC: 2.84 UIU/ML (ref 0.45–4.5)
UROBILINOGEN UR STRIP-MCNC: 0.2 MG/DL (ref 0.2–1)
VLDLC SERPL CALC-MCNC: 35 MG/DL (ref 5–40)
WBC # BLD AUTO: 11.6 X10E3/UL (ref 3.4–10.8)
WBC #/AREA URNS HPF: ABNORMAL /HPF (ref 0–5)

## 2019-10-23 PROBLEM — Z01.810 PREOP CARDIOVASCULAR EXAM: Status: RESOLVED | Noted: 2019-09-23 | Resolved: 2019-10-23

## 2019-11-13 NOTE — PROGRESS NOTES
Neurology Note    Patient ID:  Martell Severance  [id number]  53 y.o.  1934      Date of Consultation:  November 14, 2019    Referring Physician: Dr. Tanmay Larsen  Reason for Consultation:  Memory concerns    Subjective: I am more forgetful. History of Present Illness:   Martell Severance is a 80 y.o. male who was referred for an evaluation of memory difficulties. The patient does present to clinic with his daughter who helps to fill in some of the information. The patient states that he is more forgetful than he was in the past.  His daughter feels that this is most related to his recent memory but that he does much better with remote memory. He was living with a girlfriend for many years but did just move in with his daughter a little over a year ago. This is when his daughter began to notice some of these concerns. He did bounce a few checks over a year ago but has now had his daughter perform all of his bills and checking. He denies any difficulty with his activities of daily living. He is able to care for himself in the bathroom. His daughter performs all of the cooking and cleaning of the house. He denies any numbness, tingling, or weakness. It appears from reviewing his medication list he has been started on low-dose Aricept to potentially help his memory.     He denies any bowel bladder difficulty      Past Medical History:   Diagnosis Date    CAD (coronary artery disease)     CKD (chronic kidney disease) 9/24/2019    Diabetes ([facility] [address]     Essential hypertension     Hypercholesterolemia     S/P cardiac cath 9/24/2019    S/P coronary artery stent placement 9/25/2019 9/24/19 PCI/JOSE to distal LMT, ,prox LAD and LCX, distal RCA    Thyroid disease         Past Surgical History:   Procedure Laterality Date    HX PACEMAKER          Family History   Problem Relation Age of Onset    Diabetes Mother     Heart Surgery Father     No Known Problems Sister     No Known Problems Brother     No Known Problems Sister     No Known Problems Sister         Social History     Tobacco Use    Smoking status: Former Smoker     Last attempt to quit: 1979     Years since quittin.2    Smokeless tobacco: Never Used   Substance Use Topics    Alcohol use: Not Currently        No Known Allergies     Prior to Admission medications    Medication Sig Start Date End Date Taking? Authorizing Provider   amLODIPine-benazepril (LOTREL) 10-40 mg per capsule Take 1 Cap by mouth daily. 10/11/19  Yes Greg Back MD   metoprolol succinate (TOPROL-XL) 50 mg XL tablet Take 1 Tab by mouth daily. 10/11/19  Yes Greg Back MD   chlorthalidone (HYGROTEN) 25 mg tablet Take 1 Tab by mouth daily. 10/11/19  Yes Greg Back MD   levothyroxine (SYNTHROID) 100 mcg tablet Take 1 Tab by mouth Daily (before breakfast). 10/11/19  Yes Greg Back MD   donepezil (ARICEPT) 5 mg tablet Take 1 Tab by mouth nightly. 10/11/19  Yes Greg Back MD   clopidogrel (PLAVIX) 75 mg tab Take 1 Tab by mouth daily. 10/11/19  Yes Greg Back MD   atorvastatin (LIPITOR) 40 mg tablet Take 1 Tab by mouth daily. 10/11/19  Yes Greg Back MD   glyBURIDE (DIABETA) 5 mg tablet Take 2 Tabs by mouth two (2) times daily (with meals). 10/11/19  Yes Greg Back MD   aspirin delayed-release 81 mg tablet Take  by mouth daily.    Yes Provider, Historical       Review of Systems:    General, constitutional: negative  Eyes, vision: negative  Ears, nose, throat: negative  Cardiovascular, heart: negative  Respiratory: cough  Gastrointestinal: negative  Genitourinary: negative  Musculoskeletal: joint pain  Skin and integumentary: negative  Psychiatric: negative  Endocrine: negative  Neurological: negative, except for HPI  Hematologic/lymphatic: negative  Allergy/immunology: negative      Objective:     Visit Vitals  /70   Pulse 74   Ht 5' 11\" (1.803 m)   Wt 180 lb (81.6 kg)   SpO2 98%   BMI 25.10 kg/m²       Physical Exam:      General: appears well nourished in no acute distress  Neck: no carotid bruits  Lungs: clear to auscultation  Heart:  no murmurs, regular rate  Lower extremity: peripheral pulses palpable and no edema  Skin: intact    Neurological exam:    Awake, alert, oriented to person, place. Incorrect year, month, date. He did score a 13 on the [facility] cognitive assessment test.  There is limitations in recent memory but does much better with remote memory. His attention and concentration were diminished. There was no aphasia. There was no dysarthria. His fund of knowledge was diminished. He stated 3 words that begin with the letter F in 1 minute. Cranial nerves:   II-XII were tested    Perrrla  Fundoscopic examination revealed venous pulsations and no clear abnormalities  Visual fields were full  Eomi, no evidence of nystagmus  Facial sensation:  normal and symmetric  Facial motor: normal and symmetric  Hearing intact  SCM strength intact  Tongue: midline without fasciculations    Motor: Tone normal    No evidence of fasciculations    Strength testing:   deltoid triceps biceps Wrist ext. Wrist flex. intrinsics Hip flex. Hip ext. Knee ext. Knee flex Dorsi flex Plantar flex   Right 5 5 5 5 5 5 5 5 5 5 5 5   Left 5 5 5 5 5 5 5 5 5 5 5 5         Sensory:  Upper extremity: intact to pp, light touch, and vibration   Lower extremity: intact to pp, light touch, and vibration     Reflexes:    Right Left  Biceps  2 2  Triceps 2 2  Brachiorad. 2 2  Patella  2 2  Achilles 2 2    Plantar response:  flexor bilaterally      Cerebellar testing:  no tremor apparent, finger/nose and jemima were intact    Romberg: absent    Gait: steady.      Labs:     Lab Results   Component Value Date/Time    Hemoglobin A1c 9.3 (H) 10/11/2019 01:12 PM    Sodium 135 10/11/2019 01:12 PM    Potassium 4.7 10/11/2019 01:12 PM    Chloride 92 (L) 10/11/2019 01:12 PM    Glucose 250 (H) 10/11/2019 01:12 PM    BUN 30 (H) 10/11/2019 01:12 PM    Creatinine 1.88 (H) 10/11/2019 01:12 PM    Calcium 9.0 10/11/2019 01:12 PM    WBC 11.6 (H) 10/11/2019 01:12 PM    HCT 38.8 10/11/2019 01:12 PM    HGB 13.1 10/11/2019 01:12 PM    PLATELET 485 63/08/7869 01:12 PM           Assessment and Plan:   The patient is a pleasant 80-year-old gentleman who presents for an evaluation of progressive cognitive decline. His neurological examination is notable for decreased attention, concentration, short-term memory, and fund of knowledge. Worsening cognition:  The differential for this would include vascular dementia versus Alzheimer's type dementia. He does have multiple risk factors for vascular dementia including his hypertension, coronary artery disease, hypercholesterol anemia, and poorly controlled diabetes. I did discuss the differential with he and his daughter today. I would like to perform a head CT of his brain to help with potential determination of dementia type as well as to ensure that there was no space-occupying lesion. I would continue at the low-dose Aricept at this time. Depending on what we see from the imaging, he may need higher doses of medication. I discussed with the patient and family members in regards to the patient's cognitive limitations and need to ensure patient safety. Attempts to minimize opportunities of harm is important. Activities to be monitored, or avoided, would include cooking, ironing clothes, financial bill payments. Having structure to a day is important for the patient. Cognitive and physical activities should be considered daily. Vascular health:  He does have multiple risk factors for stroke. He will continue on his antiplatelet therapy of aspirin and Plavix. He will also continue with his medications to aggressively treat blood pressure cholesterol and glucose. He did have carotid Dopplers performed 2 months ago which did not show any significant stenosis. Vitamin D deficiency:   This was just found on his recent laboratory results. He has a follow-up appointment with his primary care doctor in regards to treatment. Patient Active Problem List   Diagnosis Code    Hypertension I10    Coronary artery disease involving native coronary artery of native heart I25.10    Controlled type 2 diabetes mellitus, without long-term current use of insulin ([facility]) E11.9    Pacemaker Z95.0    Elevated blood pressure affecting pregnancy in first trimester, antepartum O16.1    Angina, class III ([facility]) I20.9    Bilateral carotid artery stenosis I65.23    CKD (chronic kidney disease) N18.9    S/P cardiac cath Z98.890    S/P coronary artery stent placement Z95.5               The patient should return to clinic in 3-6 months    Renewed medication:None today    I spent   60  minutes with the patient  with over 50 % of the time counseling and coordinating the care plan in regards to the diagnosis, diagnostic testing, and treatment plan. The patient had the ability to ask questions and all questions were answered.          Signed By:  Miriam Lee DO FAAN    November 14, 2019

## 2019-11-14 ENCOUNTER — OFFICE VISIT (OUTPATIENT)
Dept: NEUROLOGY | Age: 84
End: 2019-11-14

## 2019-11-14 VITALS
DIASTOLIC BLOOD PRESSURE: 70 MMHG | HEIGHT: 71 IN | SYSTOLIC BLOOD PRESSURE: 130 MMHG | WEIGHT: 180 LBS | BODY MASS INDEX: 25.2 KG/M2 | OXYGEN SATURATION: 98 % | HEART RATE: 74 BPM

## 2019-11-14 DIAGNOSIS — F03.90 DEMENTIA WITHOUT BEHAVIORAL DISTURBANCE, UNSPECIFIED DEMENTIA TYPE: Primary | ICD-10-CM

## 2019-11-14 NOTE — PATIENT INSTRUCTIONS

## 2019-12-02 ENCOUNTER — OFFICE VISIT (OUTPATIENT)
Dept: CARDIOLOGY CLINIC | Age: 84
End: 2019-12-02

## 2019-12-02 ENCOUNTER — HOSPITAL ENCOUNTER (OUTPATIENT)
Dept: CT IMAGING | Age: 84
Discharge: HOME | End: 2019-12-02
Attending: PSYCHIATRY & NEUROLOGY
Payer: MEDICARE

## 2019-12-02 DIAGNOSIS — Z95.0 CARDIAC PACEMAKER IN SITU: Primary | ICD-10-CM

## 2019-12-02 DIAGNOSIS — F03.90 DEMENTIA WITHOUT BEHAVIORAL DISTURBANCE, UNSPECIFIED DEMENTIA TYPE: ICD-10-CM

## 2019-12-02 DIAGNOSIS — I49.5 SICK SINUS SYNDROME (HCC): ICD-10-CM

## 2019-12-02 PROCEDURE — 70450 CT HEAD/BRAIN W/O DYE: CPT

## 2019-12-03 ENCOUNTER — OFFICE VISIT (OUTPATIENT)
Dept: FAMILY MEDICINE CLINIC | Age: 84
End: 2019-12-03

## 2019-12-03 VITALS
BODY MASS INDEX: 24.78 KG/M2 | WEIGHT: 177 LBS | TEMPERATURE: 97.6 F | HEART RATE: 82 BPM | OXYGEN SATURATION: 96 % | DIASTOLIC BLOOD PRESSURE: 68 MMHG | HEIGHT: 71 IN | SYSTOLIC BLOOD PRESSURE: 127 MMHG | RESPIRATION RATE: 20 BRPM

## 2019-12-03 DIAGNOSIS — N18.30 CKD (CHRONIC KIDNEY DISEASE) STAGE 3, GFR 30-59 ML/MIN (HCC): ICD-10-CM

## 2019-12-03 DIAGNOSIS — Z01.00 DIABETIC EYE EXAM (HCC): ICD-10-CM

## 2019-12-03 DIAGNOSIS — E55.9 VITAMIN D DEFICIENCY: ICD-10-CM

## 2019-12-03 DIAGNOSIS — E03.9 ACQUIRED HYPOTHYROIDISM: ICD-10-CM

## 2019-12-03 DIAGNOSIS — E11.9 DIABETIC EYE EXAM (HCC): ICD-10-CM

## 2019-12-03 DIAGNOSIS — I10 HYPERTENSION, WELL CONTROLLED: ICD-10-CM

## 2019-12-03 DIAGNOSIS — E78.00 HYPERCHOLESTEROLEMIA: ICD-10-CM

## 2019-12-03 RX ORDER — CHOLECALCIFEROL (VITAMIN D3) 125 MCG
5000 CAPSULE ORAL DAILY
Qty: 90 CAP | Refills: 1 | Status: SHIPPED | OUTPATIENT
Start: 2019-12-03 | End: 2020-02-11 | Stop reason: SDUPTHER

## 2019-12-03 NOTE — PATIENT INSTRUCTIONS
Learning About Vitamin D Why is it important to get enough vitamin D? Your body needs vitamin D to absorb calcium. Calcium keeps your bones and muscles, including your heart, healthy and strong. If your muscles don't get enough calcium, they can cramp, hurt, or feel weak. You may have long-term (chronic) muscle aches and pains. If you don't get enough vitamin D throughout life, you have an increased chance of having thin and brittle bones (osteoporosis) in your later years. Children who don't get enough vitamin D may not grow as much as others their age. They also have a chance of getting a rare disease called rickets. It causes weak bones. Vitamin D and calcium are added to many foods. And your body uses sunshine to make its own vitamin D. How much vitamin D do you need? The [place] of Medicine recommends that people ages 3 through 79 get 600 IU (international units) every day. Adults 71 and older need 800 IU every day. Blood tests for vitamin D can check your vitamin D level. But there is no standard normal range used by all laboratories. You're likely getting enough vitamin D if your levels are in the range of 20 to 50 ng/mL. How can you get more vitamin D? Foods that contain vitamin D include: 
· Combes, tuna, and mackerel. These are some of the best foods to eat when you need to get more vitamin D. 
· Cheese, egg yolks, and beef liver. These foods have vitamin D in small amounts. · Milk, soy drinks, orange juice, yogurt, margarine, and some kinds of cereal have vitamin D added to them. Some people don't make vitamin D as well as others. They may have to take extra care in getting enough vitamin D. Things that reduce how much vitamin D your body makes include: · Dark skin, such as many  Americans have. · Age, especially if you are older than 72. · Digestive problems, such as Crohn's or celiac disease. · Liver and kidney disease.  
Some people who do not get enough vitamin D may need supplements. Are there any risks from taking vitamin D? 
· Too much vitamin D: 
? Can damage your kidneys. ? Can cause nausea and vomiting, constipation, and weakness. ? Raises the amount of calcium in your blood. If this happens, you can get confused or have an irregular heart rhythm. · Vitamin D may interact with other medicines. Tell your doctor about all of the medicines you take, including over-the-counter drugs, herbs, and pills. Tell your doctor about all of your current medical problems. Where can you learn more? Go to http://diego-leonor.info/. Enter G730 in the search box to learn more about \"Learning About Vitamin D.\" 
Current as of: November 7, 2018 Content Version: 12.2 © 5872-2713 [employer]. Care instructions adapted under license by [employer] (which disclaims liability or warranty for this information). If you have questions about a medical condition or this instruction, always ask your healthcare professional. [place] [address] any warranty or liability for your use of this information.

## 2019-12-03 NOTE — PROGRESS NOTES
Chief Complaint   Patient presents with    Results     5 wwek     HPI:  Jann Olvera is a 80 y.o. male with diabetes, hypertension, hypercholesterolemia, chronic renal failure presents for 5 week follow up on lab results. Results review worsening chronic renal failure. Discussed referral to nephrologist. Patient agreed to plan. A1C shows poorly controlled diabetes. TSH is within normal limits. Review of Systems  As per hpi    Past Medical History:   Diagnosis Date    CAD (coronary artery disease)     CKD (chronic kidney disease) 2019    Diabetes ([address]     Essential hypertension     Hypercholesterolemia     S/P cardiac cath 2019    S/P coronary artery stent placement 2019 PCI/JOSE to distal LMT, ,prox LAD and LCX, distal RCA    Thyroid disease      Past Surgical History:   Procedure Laterality Date    HX PACEMAKER       Social History     Socioeconomic History    Marital status:      Spouse name: Not on file    Number of children: Not on file    Years of education: Not on file    Highest education level: Not on file   Tobacco Use    Smoking status: Former Smoker     Last attempt to quit: 1979     Years since quittin.2    Smokeless tobacco: Never Used   Substance and Sexual Activity    Alcohol use: Not Currently    Drug use: Never    Sexual activity: Not Currently     Family History   Problem Relation Age of Onset    Diabetes Mother     Heart Surgery Father     No Known Problems Sister     No Known Problems Brother     No Known Problems Sister     No Known Problems Sister      Current Outpatient Medications   Medication Sig Dispense Refill    amLODIPine-benazepril (LOTREL) 10-40 mg per capsule Take 1 Cap by mouth daily. 90 Cap 1    metoprolol succinate (TOPROL-XL) 50 mg XL tablet Take 1 Tab by mouth daily. 90 Tab 1    chlorthalidone (HYGROTEN) 25 mg tablet Take 1 Tab by mouth daily.  90 Tab 1    levothyroxine (SYNTHROID) 100 mcg tablet Take 1 Tab by mouth Daily (before breakfast). 90 Tab 1    donepezil (ARICEPT) 5 mg tablet Take 1 Tab by mouth nightly. 90 Tab 1    clopidogrel (PLAVIX) 75 mg tab Take 1 Tab by mouth daily. 90 Tab 1    atorvastatin (LIPITOR) 40 mg tablet Take 1 Tab by mouth daily. 90 Tab 1    glyBURIDE (DIABETA) 5 mg tablet Take 2 Tabs by mouth two (2) times daily (with meals). 360 Tab 1    aspirin delayed-release 81 mg tablet Take  by mouth daily. No Known Allergies    Objective:  Visit Vitals  /68   Pulse 82   Temp 97.6 °F (36.4 °C)   Resp 20   Ht 5' 11\" (1.803 m)   Wt 177 lb (80.3 kg)   SpO2 96%   BMI 24.69 kg/m²     Physical Exam:   General appearance - alert, well appearing in no distress  Mental status - alert, oriented to person, place, and time  Neck - supple, no significant adenopathy   Chest - clear to auscultation, no wheezes, rales or rhonchi  Heart - normal rate, regular rhythm, normal blood pressure   Abdomen - soft, nontender, nondistended, no organomegaly  Ext-peripheral pulses normal, no pedal edema  Neuro -alert, oriented, normal speech, no focal findings  Back-full range of motion, no tenderness, palpable spasm or pain on motion     Results for orders placed or performed in visit on 80/10/20   METABOLIC PANEL, COMPREHENSIVE   Result Value Ref Range    Glucose 250 (H) 65 - 99 mg/dL    BUN 30 (H) 8 - 27 mg/dL    Creatinine 1.88 (H) 0.76 - 1.27 mg/dL    GFR est non-AA 32 (L) >59 mL/min/1.73    GFR est AA 37 (L) >59 mL/min/1.73    BUN/Creatinine ratio 16 10 - 24    Sodium 135 134 - 144 mmol/L    Potassium 4.7 3.5 - 5.2 mmol/L    Chloride 92 (L) 96 - 106 mmol/L    CO2 22 20 - 29 mmol/L    Calcium 9.0 8.6 - 10.2 mg/dL    Protein, total 7.3 6.0 - 8.5 g/dL    Albumin 4.6 3.5 - 4.7 g/dL    GLOBULIN, TOTAL 2.7 1.5 - 4.5 g/dL    A-G Ratio 1.7 1.2 - 2.2    Bilirubin, total 0.3 0.0 - 1.2 mg/dL    Alk.  phosphatase 90 39 - 117 IU/L    AST (SGOT) 18 0 - 40 IU/L    ALT (SGPT) 14 0 - 44 IU/L   CBC WITH AUTOMATED DIFF   Result Value Ref Range    WBC 11.6 (H) 3.4 - 10.8 x10E3/uL    RBC 4.62 4.14 - 5.80 x10E6/uL    HGB 13.1 13.0 - 17.7 g/dL    HCT 38.8 37.5 - 51.0 %    MCV 84 79 - 97 fL    MCH 28.4 26.6 - 33.0 pg    MCHC 33.8 31.5 - 35.7 g/dL    RDW 12.7 12.3 - 15.4 %    PLATELET 415 237 - 470 x10E3/uL    NEUTROPHILS 73 Not Estab. %    Lymphocytes 12 Not Estab. %    MONOCYTES 11 Not Estab. %    EOSINOPHILS 2 Not Estab. %    BASOPHILS 1 Not Estab. %    ABS. NEUTROPHILS 8.7 (H) 1.4 - 7.0 x10E3/uL    Abs Lymphocytes 1.4 0.7 - 3.1 x10E3/uL    ABS. MONOCYTES 1.2 (H) 0.1 - 0.9 x10E3/uL    ABS. EOSINOPHILS 0.2 0.0 - 0.4 x10E3/uL    ABS. BASOPHILS 0.1 0.0 - 0.2 x10E3/uL    IMMATURE GRANULOCYTES 1 Not Estab. %    ABS. IMM.  GRANS. 0.1 0.0 - 0.1 x10E3/uL   LIPID PANEL   Result Value Ref Range    Cholesterol, total 86 (L) 100 - 199 mg/dL    Triglyceride 175 (H) 0 - 149 mg/dL    HDL Cholesterol 28 (L) >39 mg/dL    VLDL, calculated 35 5 - 40 mg/dL    LDL, calculated 23 0 - 99 mg/dL   HEMOGLOBIN A1C WITH EAG   Result Value Ref Range    Hemoglobin A1c 9.3 (H) 4.8 - 5.6 %    Estimated average glucose 220 mg/dL   TSH 3RD GENERATION   Result Value Ref Range    TSH 2.840 0.450 - 4.500 uIU/mL   VITAMIN D, 25 HYDROXY   Result Value Ref Range    VITAMIN D, 25-HYDROXY 22.8 (L) 30.0 - 100.0 ng/mL   URINALYSIS W/ RFLX MICROSCOPIC   Result Value Ref Range    Specific Gravity 1.017 1.005 - 1.030    pH (UA) 5.5 5.0 - 7.5    Color Yellow Yellow    Appearance Clear Clear    Leukocyte Esterase Negative Negative    Protein 1+ (A) Negative/Trace    Glucose Trace (A) Negative    Ketone Negative Negative    Blood Negative Negative    Bilirubin Negative Negative    Urobilinogen 0.2 0.2 - 1.0 mg/dL    Nitrites Negative Negative    Microscopic Examination See additional order    PSA SCREENING (SCREENING)   Result Value Ref Range    Prostate Specific Ag 0.5 0.0 - 4.0 ng/mL   MICROALBUMIN, UR, RAND W/ MICROALB/CREAT RATIO   Result Value Ref Range    Creatinine, urine 182.9 Not Estab. mg/dL    Microalbumin, urine 320.3 Not Estab. ug/mL    Microalb/Creat ratio (ug/mg creat.) 175.1 (H) 0.0 - 30.0 mg/g creat   MICROSCOPIC EXAMINATION   Result Value Ref Range    WBC 0-5 0 - 5 /hpf    RBC 0-2 0 - 2 /hpf    Epithelial cells 0-10 0 - 10 /hpf    Casts Present (A) None seen /lpf    Cast type Hyaline casts N/A    Mucus Present Not Estab. Bacteria Few None seen/Few   CKD REPORT   Result Value Ref Range    Interpretation Note    DIABETES PATIENT EDUCATION   Result Value Ref Range    PDF Image Not applicable      Assessment/Plan:  Diagnoses and all orders for this visit:    Uncontrolled type 2 diabetes mellitus with diabetic nephropathy ([facility] [address]  -     REFERRAL TO NEPHROLOGY  -     REFERRAL TO PHARMACIST    Acquired hypothyroidism    Hypercholesterolemia    Hypertension, well controlled    Vitamin D deficiency  -     cholecalciferol (VITAMIN D3) (5000 Units /125 mcg) capsule; Take 1 Cap by mouth daily. , Normal, Disp-90 Cap, R-1    Diabetic eye exam ([facility] [address]  -     REFERRAL TO OPTOMETRY    CKD (chronic kidney disease) stage 3, GFR 30-59 ml/min ([facility])  -     REFERRAL TO NEPHROLOGY      Patient Instructions        Learning About Vitamin D  Why is it important to get enough vitamin D? Your body needs vitamin D to absorb calcium. Calcium keeps your bones and muscles, including your heart, healthy and strong. If your muscles don't get enough calcium, they can cramp, hurt, or feel weak. You may have long-term (chronic) muscle aches and pains. If you don't get enough vitamin D throughout life, you have an increased chance of having thin and brittle bones (osteoporosis) in your later years. Children who don't get enough vitamin D may not grow as much as others their age. They also have a chance of getting a rare disease called rickets. It causes weak bones. Vitamin D and calcium are added to many foods. And your body uses sunshine to make its own vitamin D. How much vitamin D do you need?   The [place] [employer] recommends that people ages 3 through 79 get 600 IU (international units) every day. Adults 71 and older need 800 IU every day. Blood tests for vitamin D can check your vitamin D level. But there is no standard normal range used by all laboratories. You're likely getting enough vitamin D if your levels are in the range of 20 to 50 ng/mL. How can you get more vitamin D? Foods that contain vitamin D include:  · [place], tuna, and mackerel. These are some of the best foods to eat when you need to get more vitamin D.  · Cheese, egg yolks, and beef liver. These foods have vitamin D in small amounts. · Milk, soy drinks, orange juice, yogurt, margarine, and some kinds of cereal have vitamin D added to them. Some people don't make vitamin D as well as others. They may have to take extra care in getting enough vitamin D. Things that reduce how much vitamin D your body makes include:  · Dark skin, such as many  Americans have. · Age, especially if you are older than 72. · Digestive problems, such as Crohn's or celiac disease. · Liver and kidney disease. Some people who do not get enough vitamin D may need supplements. Are there any risks from taking vitamin D?  · Too much vitamin D:  ? Can damage your kidneys. ? Can cause nausea and vomiting, constipation, and weakness. ? Raises the amount of calcium in your blood. If this happens, you can get confused or have an irregular heart rhythm. · Vitamin D may interact with other medicines. Tell your doctor about all of the medicines you take, including over-the-counter drugs, herbs, and pills. Tell your doctor about all of your current medical problems. Where can you learn more? Go to http://diego-leonor.info/. Enter [id number] in the search box to learn more about \"Learning About Vitamin D.\"  Current as of: November 7, 2018  Content Version: 12.2  © 0651-6120 [employer].  Care instructions adapted under license by [employer] (which disclaims liability or warranty for this information). If you have questions about a medical condition or this instruction, always ask your healthcare professional. [place] [address] any warranty or liability for your use of this information. Follow-up and Dispositions    · Return in about 3 months (around 3/3/2020) for routine follow up.

## 2020-01-14 ENCOUNTER — OFFICE VISIT (OUTPATIENT)
Dept: CARDIOLOGY CLINIC | Age: 85
End: 2020-01-14

## 2020-01-14 VITALS
WEIGHT: 180 LBS | RESPIRATION RATE: 16 BRPM | BODY MASS INDEX: 25.2 KG/M2 | OXYGEN SATURATION: 100 % | SYSTOLIC BLOOD PRESSURE: 130 MMHG | HEIGHT: 71 IN | DIASTOLIC BLOOD PRESSURE: 60 MMHG | HEART RATE: 62 BPM

## 2020-01-14 DIAGNOSIS — I10 ESSENTIAL HYPERTENSION: ICD-10-CM

## 2020-01-14 DIAGNOSIS — E11.21 CONTROLLED TYPE 2 DIABETES MELLITUS WITH DIABETIC NEPHROPATHY, WITHOUT LONG-TERM CURRENT USE OF INSULIN (HCC): ICD-10-CM

## 2020-01-14 DIAGNOSIS — I25.10 CORONARY ARTERY DISEASE INVOLVING NATIVE CORONARY ARTERY OF NATIVE HEART WITHOUT ANGINA PECTORIS: Primary | ICD-10-CM

## 2020-01-14 DIAGNOSIS — Z95.0 CARDIAC PACEMAKER IN SITU: ICD-10-CM

## 2020-01-14 DIAGNOSIS — E78.2 MIXED HYPERLIPIDEMIA: ICD-10-CM

## 2020-01-14 NOTE — PROGRESS NOTES
1. Have you been to the ER, urgent care clinic since your last visit? Hospitalized since your last visit? NO    2. Have you seen or consulted any other health care providers outside of the [address] [place] [address] since your last visit? Include any pap smears or colon screening. NO    3 MONTH FOLLOW UP. NO CARDIAC C/O.

## 2020-01-14 NOTE — PROGRESS NOTES
NAME:  Dalton Rosas   :   1934   MRN:   [id number]   PCP:  Serenity Rodríguez MD           Subjective: The patient is a 80y.o. year old male  who returns for a routine follow-up. Since the last visit, patient reports no change in exercise tolerance, chest pain, edema, medication intolerance, palpitations, shortness of breath, PND/orthopnea wheezing, sputum, syncope, dizziness or light headedness. Doing well. Past Medical History:   Diagnosis Date    CAD (coronary artery disease)     CKD (chronic kidney disease) 2019    Diabetes ([facility] [address]     Essential hypertension     Hypercholesterolemia     S/P cardiac cath 2019    S/P coronary artery stent placement 2019 PCI/JOSE to distal LMT, ,prox LAD and LCX, distal RCA    Thyroid disease         ICD-10-CM ICD-9-CM    1. Coronary artery disease involving native coronary artery of native heart without angina pectoris I25.10 414.01 AMB POC EKG ROUTINE W/ 12 LEADS, INTER & REP   2. Essential hypertension I10 401.9    3. Mixed hyperlipidemia E78.2 272.2    4. Cardiac pacemaker in situ Z95.0 V45.01    5. Controlled type 2 diabetes mellitus with diabetic nephropathy, without long-term current use of insulin (HCC) E11.21 250.40      583.81       Social History     Tobacco Use    Smoking status: Former Smoker     Last attempt to quit: 1979     Years since quittin.4    Smokeless tobacco: Never Used   Substance Use Topics    Alcohol use: Not Currently      Family History   Problem Relation Age of Onset    Diabetes Mother     Heart Surgery Father     No Known Problems Sister     No Known Problems Brother     No Known Problems Sister     No Known Problems Sister         Review of Systems  General: Pt denies excessive weight gain or loss. Pt is able to conduct ADL's  HEENT: Denies blurred vision, headaches, epistaxis and difficulty swallowing.   Respiratory: Denies shortness of breath, CORDOVA, wheezing or stridor. Cardiovascular: Denies precordial pain, palpitations, edema or PND  Gastrointestinal: Denies poor appetite, indigestion, abdominal pain or blood in stool  Musculoskeletal: Denies pain or swelling from muscles or joints  Neurologic: Denies tremor, paresthesias, or sensory motor disturbance  Skin: Denies rash, itching or texture change. Objective:       Vitals:    01/14/20 1108 01/14/20 1114   BP: 130/62 130/60   Pulse: 62    Resp: 16    SpO2: 100%    Weight: 180 lb (81.6 kg)    Height: 5' 11\" (1.803 m)     Body mass index is 25.1 kg/m². General PE  Mental Status - Alert. General Appearance - Not in acute distress. Chest and Lung Exam   Inspection: Accessory muscles - No use of accessory muscles in breathing. Auscultation:   Breath sounds: - Normal.    Cardiovascular   Inspection: Jugular vein - Bilateral - Inspection Normal.  Palpation/Percussion:   Apical Impulse: - Normal.  Auscultation: Rhythm - Regular. Heart Sounds - S1 WNL and S2 WNL. No S3 or S4. Murmurs & Other Heart Sounds: Auscultation of the heart reveals - No Murmurs. Peripheral Vascular   Upper Extremity: Inspection - Bilateral - No Cyanotic nailbeds or Digital clubbing. Lower Extremity:   Palpation: Edema - Bilateral - No edema. Data Review:     EKG -EKG: normal EKG, normal sinus rhythm, unchanged from previous tracings. Medications reviewed  Current Outpatient Medications   Medication Sig    cholecalciferol (VITAMIN D3) (5000 Units /125 mcg) capsule Take 1 Cap by mouth daily.  amLODIPine-benazepril (LOTREL) 10-40 mg per capsule Take 1 Cap by mouth daily.  metoprolol succinate (TOPROL-XL) 50 mg XL tablet Take 1 Tab by mouth daily.  chlorthalidone (HYGROTEN) 25 mg tablet Take 1 Tab by mouth daily.  levothyroxine (SYNTHROID) 100 mcg tablet Take 1 Tab by mouth Daily (before breakfast).  donepezil (ARICEPT) 5 mg tablet Take 1 Tab by mouth nightly.     clopidogrel (PLAVIX) 75 mg tab Take 1 Tab by mouth daily.  atorvastatin (LIPITOR) 40 mg tablet Take 1 Tab by mouth daily.  glyBURIDE (DIABETA) 5 mg tablet Take 2 Tabs by mouth two (2) times daily (with meals).  aspirin delayed-release 81 mg tablet Take  by mouth daily. No current facility-administered medications for this visit. Assessment:       ICD-10-CM ICD-9-CM    1. Coronary artery disease involving native coronary artery of native heart without angina pectoris I25.10 414.01 AMB POC EKG ROUTINE W/ 12 LEADS, INTER & REP   2. Essential hypertension I10 401.9    3. Mixed hyperlipidemia E78.2 272.2    4. Cardiac pacemaker in situ Z95.0 V45.01    5. Controlled type 2 diabetes mellitus with diabetic nephropathy, without long-term current use of insulin ([facility]) E11.21 250.40      583.81         Plan:     Patient presents doing well and stable from cardiac standpoint. Continue current care and follow up in 6 months. Continue plavix. For 12 months till 9/20.     Kwame Peng MD

## 2020-02-11 ENCOUNTER — OFFICE VISIT (OUTPATIENT)
Dept: FAMILY MEDICINE CLINIC | Age: 85
End: 2020-02-11

## 2020-02-11 VITALS
DIASTOLIC BLOOD PRESSURE: 51 MMHG | RESPIRATION RATE: 20 BRPM | BODY MASS INDEX: 25.2 KG/M2 | WEIGHT: 180 LBS | HEART RATE: 67 BPM | OXYGEN SATURATION: 96 % | HEIGHT: 71 IN | SYSTOLIC BLOOD PRESSURE: 107 MMHG | TEMPERATURE: 95.7 F

## 2020-02-11 DIAGNOSIS — I10 HYPERTENSION, WELL CONTROLLED: ICD-10-CM

## 2020-02-11 DIAGNOSIS — F03.90 DEMENTIA WITHOUT BEHAVIORAL DISTURBANCE, UNSPECIFIED DEMENTIA TYPE: ICD-10-CM

## 2020-02-11 DIAGNOSIS — E11.9 ENCOUNTER FOR DIABETIC FOOT EXAM (HCC): ICD-10-CM

## 2020-02-11 DIAGNOSIS — R35.0 FREQUENCY OF URINATION: ICD-10-CM

## 2020-02-11 DIAGNOSIS — Z95.0 PACEMAKER: ICD-10-CM

## 2020-02-11 DIAGNOSIS — E55.9 VITAMIN D DEFICIENCY: ICD-10-CM

## 2020-02-11 DIAGNOSIS — E78.00 HYPERCHOLESTEROLEMIA: ICD-10-CM

## 2020-02-11 DIAGNOSIS — Z95.5 S/P CORONARY ARTERY STENT PLACEMENT: ICD-10-CM

## 2020-02-11 DIAGNOSIS — E03.9 ACQUIRED HYPOTHYROIDISM: ICD-10-CM

## 2020-02-11 RX ORDER — CHOLECALCIFEROL (VITAMIN D3) 125 MCG
5000 CAPSULE ORAL DAILY
Qty: 90 CAP | Refills: 1 | Status: SHIPPED | OUTPATIENT
Start: 2020-02-11 | End: 2020-10-06 | Stop reason: SDUPTHER

## 2020-02-11 RX ORDER — AMLODIPINE BESYLATE AND BENAZEPRIL HYDROCHLORIDE 10; 40 MG/1; MG/1
1 CAPSULE ORAL DAILY
Qty: 90 CAP | Refills: 1 | Status: SHIPPED | OUTPATIENT
Start: 2020-02-11 | End: 2020-10-06 | Stop reason: SDUPTHER

## 2020-02-11 RX ORDER — GLYBURIDE 5 MG/1
10 TABLET ORAL 2 TIMES DAILY WITH MEALS
Qty: 360 TAB | Refills: 1 | Status: SHIPPED | OUTPATIENT
Start: 2020-02-11 | End: 2020-10-06 | Stop reason: SDUPTHER

## 2020-02-11 RX ORDER — CHLORTHALIDONE 25 MG/1
25 TABLET ORAL DAILY
Qty: 90 TAB | Refills: 1 | Status: SHIPPED | OUTPATIENT
Start: 2020-02-11 | End: 2020-10-06

## 2020-02-11 RX ORDER — DONEPEZIL HYDROCHLORIDE 5 MG/1
5 TABLET, FILM COATED ORAL
Qty: 90 TAB | Refills: 1 | Status: SHIPPED | OUTPATIENT
Start: 2020-02-11 | End: 2020-10-06 | Stop reason: SDUPTHER

## 2020-02-11 RX ORDER — LEVOTHYROXINE SODIUM 100 UG/1
100 TABLET ORAL
Qty: 90 TAB | Refills: 1 | Status: SHIPPED | OUTPATIENT
Start: 2020-02-11 | End: 2020-10-06 | Stop reason: SDUPTHER

## 2020-02-11 RX ORDER — METOPROLOL SUCCINATE 50 MG/1
50 TABLET, EXTENDED RELEASE ORAL DAILY
Qty: 90 TAB | Refills: 1 | Status: SHIPPED | OUTPATIENT
Start: 2020-02-11 | End: 2020-10-06 | Stop reason: SDUPTHER

## 2020-02-11 RX ORDER — ATORVASTATIN CALCIUM 40 MG/1
40 TABLET, FILM COATED ORAL DAILY
Qty: 90 TAB | Refills: 1 | Status: SHIPPED | OUTPATIENT
Start: 2020-02-11 | End: 2020-10-06 | Stop reason: SDUPTHER

## 2020-02-11 RX ORDER — CLOPIDOGREL BISULFATE 75 MG/1
75 TABLET ORAL DAILY
Qty: 90 TAB | Refills: 1 | Status: SHIPPED | OUTPATIENT
Start: 2020-02-11 | End: 2020-10-06 | Stop reason: SDUPTHER

## 2020-02-11 NOTE — PROGRESS NOTES
Chief Complaint   Patient presents with    Follow-up     2 months     HPI:  Zechariah Hoover is a 80 y.o.  male with h/o hypertension, hypercholesterolemia, diabetes type 2, CKD, CAD, s/p cardiac cath, bilateral carotid artery stenosis presents accompanied by daughter for 2 month follow up  Patient has been doing well. Blood pressure is at goal. Patient has no complaints. Patient is asking for medication refill. Review of Systems  As per hpi    Past Medical History:   Diagnosis Date    CAD (coronary artery disease)     CKD (chronic kidney disease) 2019    Diabetes ([address]     Essential hypertension     Hypercholesterolemia     S/P cardiac cath 2019    S/P coronary artery stent placement 2019 PCI/JOSE to distal LMT, ,prox LAD and LCX, distal RCA    Thyroid disease      Past Surgical History:   Procedure Laterality Date    HX PACEMAKER       Social History     Socioeconomic History    Marital status:      Spouse name: Not on file    Number of children: Not on file    Years of education: Not on file    Highest education level: Not on file   Tobacco Use    Smoking status: Former Smoker     Last attempt to quit: 1979     Years since quittin.4    Smokeless tobacco: Never Used   Substance and Sexual Activity    Alcohol use: Not Currently    Drug use: Never    Sexual activity: Not Currently     Family History   Problem Relation Age of Onset    Diabetes Mother     Heart Surgery Father     No Known Problems Sister     No Known Problems Brother     No Known Problems Sister     No Known Problems Sister      Current Outpatient Medications   Medication Sig Dispense Refill    amLODIPine-benazepril (LOTREL) 10-40 mg per capsule Take 1 Cap by mouth daily. 90 Cap 1    atorvastatin (LIPITOR) 40 mg tablet Take 1 Tab by mouth daily. 90 Tab 1    chlorthalidone (HYGROTEN) 25 mg tablet Take 1 Tab by mouth daily.  90 Tab 1    cholecalciferol (VITAMIN D3) (5000 Units /125 mcg) capsule Take 1 Cap by mouth daily. 90 Cap 1    clopidogreL (PLAVIX) 75 mg tab Take 1 Tab by mouth daily. 90 Tab 1    donepeziL (ARICEPT) 5 mg tablet Take 1 Tab by mouth nightly. 90 Tab 1    glyBURIDE (DIABETA) 5 mg tablet Take 2 Tabs by mouth two (2) times daily (with meals). 360 Tab 1    levothyroxine (SYNTHROID) 100 mcg tablet Take 1 Tab by mouth Daily (before breakfast). 90 Tab 1    metoprolol succinate (TOPROL-XL) 50 mg XL tablet Take 1 Tab by mouth daily. 90 Tab 1    aspirin delayed-release 81 mg tablet Take  by mouth daily.        No Known Allergies    Objective:  Visit Vitals  /51   Pulse 67   Temp 95.7 °F (35.4 °C) (Oral)   Resp 20   Ht 5' 11\" (1.803 m)   Wt 180 lb (81.6 kg)   SpO2 96%   BMI 25.10 kg/m²     Physical Exam:   General appearance - alert, well appearing in no distress  Mental status - alert, oriented to person, place, and time  EYE-PERRL, EOMI  Neck - supple, no significant adenopathy   Chest - clear to auscultation, no wheezes, rales or rhonchi  Heart - normal rate, regular rhythm, normal blood pressure  Abdomen - soft, nontender, nondistended, no organomegaly  Ext-peripheral pulses normal, no pedal edema  Neuro -alert, oriented, normal speech, no focal findings  Back-full range of motion, no tenderness, palpable spasm or pain on motion     Results for orders placed or performed in visit on 11/42/68   METABOLIC PANEL, COMPREHENSIVE   Result Value Ref Range    Glucose 250 (H) 65 - 99 mg/dL    BUN 30 (H) 8 - 27 mg/dL    Creatinine 1.88 (H) 0.76 - 1.27 mg/dL    GFR est non-AA 32 (L) >59 mL/min/1.73    GFR est AA 37 (L) >59 mL/min/1.73    BUN/Creatinine ratio 16 10 - 24    Sodium 135 134 - 144 mmol/L    Potassium 4.7 3.5 - 5.2 mmol/L    Chloride 92 (L) 96 - 106 mmol/L    CO2 22 20 - 29 mmol/L    Calcium 9.0 8.6 - 10.2 mg/dL    Protein, total 7.3 6.0 - 8.5 g/dL    Albumin 4.6 3.5 - 4.7 g/dL    GLOBULIN, TOTAL 2.7 1.5 - 4.5 g/dL    A-G Ratio 1.7 1.2 - 2.2    Bilirubin, total 0.3 0.0 - 1.2 mg/dL    Alk. phosphatase 90 39 - 117 IU/L    AST (SGOT) 18 0 - 40 IU/L    ALT (SGPT) 14 0 - 44 IU/L   CBC WITH AUTOMATED DIFF   Result Value Ref Range    WBC 11.6 (H) 3.4 - 10.8 x10E3/uL    RBC 4.62 4.14 - 5.80 x10E6/uL    HGB 13.1 13.0 - 17.7 g/dL    HCT 38.8 37.5 - 51.0 %    MCV 84 79 - 97 fL    MCH 28.4 26.6 - 33.0 pg    MCHC 33.8 31.5 - 35.7 g/dL    RDW 12.7 12.3 - 15.4 %    PLATELET 991 447 - 014 x10E3/uL    NEUTROPHILS 73 Not Estab. %    Lymphocytes 12 Not Estab. %    MONOCYTES 11 Not Estab. %    EOSINOPHILS 2 Not Estab. %    BASOPHILS 1 Not Estab. %    ABS. NEUTROPHILS 8.7 (H) 1.4 - 7.0 x10E3/uL    Abs Lymphocytes 1.4 0.7 - 3.1 x10E3/uL    ABS. MONOCYTES 1.2 (H) 0.1 - 0.9 x10E3/uL    ABS. EOSINOPHILS 0.2 0.0 - 0.4 x10E3/uL    ABS. BASOPHILS 0.1 0.0 - 0.2 x10E3/uL    IMMATURE GRANULOCYTES 1 Not Estab. %    ABS. IMM.  GRANS. 0.1 0.0 - 0.1 x10E3/uL   LIPID PANEL   Result Value Ref Range    Cholesterol, total 86 (L) 100 - 199 mg/dL    Triglyceride 175 (H) 0 - 149 mg/dL    HDL Cholesterol 28 (L) >39 mg/dL    VLDL, calculated 35 5 - 40 mg/dL    LDL, calculated 23 0 - 99 mg/dL   HEMOGLOBIN A1C WITH EAG   Result Value Ref Range    Hemoglobin A1c 9.3 (H) 4.8 - 5.6 %    Estimated average glucose 220 mg/dL   TSH 3RD GENERATION   Result Value Ref Range    TSH 2.840 0.450 - 4.500 uIU/mL   VITAMIN D, 25 HYDROXY   Result Value Ref Range    VITAMIN D, 25-HYDROXY 22.8 (L) 30.0 - 100.0 ng/mL   URINALYSIS W/ RFLX MICROSCOPIC   Result Value Ref Range    Specific Gravity 1.017 1.005 - 1.030    pH (UA) 5.5 5.0 - 7.5    Color Yellow Yellow    Appearance Clear Clear    Leukocyte Esterase Negative Negative    Protein 1+ (A) Negative/Trace    Glucose Trace (A) Negative    Ketone Negative Negative    Blood Negative Negative    Bilirubin Negative Negative    Urobilinogen 0.2 0.2 - 1.0 mg/dL    Nitrites Negative Negative    Microscopic Examination See additional order    PSA SCREENING (SCREENING)   Result Value Ref Range    Prostate Specific Ag 0.5 0.0 - 4.0 ng/mL   MICROALBUMIN, UR, RAND W/ MICROALB/CREAT RATIO   Result Value Ref Range    Creatinine, urine 182.9 Not Estab. mg/dL    Microalbumin, urine 320.3 Not Estab. ug/mL    Microalb/Creat ratio (ug/mg creat.) 175.1 (H) 0.0 - 30.0 mg/g creat   MICROSCOPIC EXAMINATION   Result Value Ref Range    WBC 0-5 0 - 5 /hpf    RBC 0-2 0 - 2 /hpf    Epithelial cells 0-10 0 - 10 /hpf    Casts Present (A) None seen /lpf    Cast type Hyaline casts N/A    Mucus Present Not Estab. Bacteria Few None seen/Few   CKD REPORT   Result Value Ref Range    Interpretation Note    DIABETES PATIENT EDUCATION   Result Value Ref Range    PDF Image Not applicable      Assessment/Plan:  Diagnoses and all orders for this visit:    Uncontrolled type 2 diabetes mellitus with diabetic nephropathy ([facility] [address]  -     glyBURIDE (DIABETA) 5 mg tablet; Take 2 Tabs by mouth two (2) times daily (with meals). , Normal, Disp-360 Tab, R-1  -     METABOLIC PANEL, COMPREHENSIVE  -     Cancel: AMB POC HEMOGLOBIN A1C  -     Cancel: AMB POC GLUCOSE BLOOD, BY GLUCOSE MONITORING DEVICE  -     Cancel: HEMOGLOBIN A1C WITH EAG  -     rosiglitazone (AVANDIA) 2 mg tablet; Take 1 Tab by mouth two (2) times a day., Normal, Disp-60 Tab, R-2    Hypertension, well controlled  -     amLODIPine-benazepril (LOTREL) 10-40 mg per capsule; Take 1 Cap by mouth daily. , Normal, Disp-90 Cap, R-1  -     chlorthalidone (HYGROTEN) 25 mg tablet; Take 1 Tab by mouth daily. , Normal, Disp-90 Tab, R-1  -     metoprolol succinate (TOPROL-XL) 50 mg XL tablet; Take 1 Tab by mouth daily. , Normal, Disp-90 Tab, R-1  -     METABOLIC PANEL, COMPREHENSIVE    S/P coronary artery stent placement  -     atorvastatin (LIPITOR) 40 mg tablet; Take 1 Tab by mouth daily. , Normal, Disp-90 Tab, R-1  -     clopidogreL (PLAVIX) 75 mg tab; Take 1 Tab by mouth daily. , Normal, Disp-90 Tab, R-1  -     LIPID PANEL    Vitamin D deficiency  -     cholecalciferol (VITAMIN D3) (5000 Units /125 mcg) capsule; Take 1 Cap by mouth daily. , Normal, Disp-90 Cap, R-1    Pacemaker  -     clopidogreL (PLAVIX) 75 mg tab; Take 1 Tab by mouth daily. , Normal, Disp-90 Tab, R-1    Dementia without behavioral disturbance, unspecified dementia type (HCC)  -     donepeziL (ARICEPT) 5 mg tablet; Take 1 Tab by mouth nightly., Normal, Disp-90 Tab, R-1    Acquired hypothyroidism  -     levothyroxine (SYNTHROID) 100 mcg tablet; Take 1 Tab by mouth Daily (before breakfast). , Normal, Disp-90 Tab, R-1  -     Cancel: TSH 3RD GENERATION    Encounter for diabetic foot exam ([facility] [address]  -      DIABETES FOOT EXAM    Hypercholesterolemia  -     atorvastatin (LIPITOR) 40 mg tablet; Take 1 Tab by mouth daily. , Normal, Disp-90 Tab, R-1  -     LIPID PANEL    Frequency of urination  -     URINALYSIS W/ RFLX MICROSCOPIC    Other orders  -     CKD REPORT  -     DIABETES PATIENT EDUCATION  -     TSH 3RD GENERATION      Patient Instructions        Learning About Diabetes Food Guidelines  Your Care Instructions    Meal planning is important to manage diabetes. It helps keep your blood sugar at a target level (which you set with your doctor). You don't have to eat special foods. You can eat what your family eats, including sweets once in a while. But you do have to pay attention to how often you eat and how much you eat of certain foods. You may want to work with a dietitian or a certified diabetes educator (CDE) to help you plan meals and snacks. A dietitian or CDE can also help you lose weight if that is one of your goals. What should you know about eating carbs? Managing the amount of carbohydrate (carbs) you eat is an important part of healthy meals when you have diabetes. Carbohydrate is found in many foods. · Learn which foods have carbs. And learn the amounts of carbs in different foods. ? Bread, cereal, pasta, and rice have about 15 grams of carbs in a serving.  A serving is 1 slice of bread (1 ounce), ½ cup of cooked cereal, or 1/3 cup of cooked pasta or rice. ? Fruits have 15 grams of carbs in a serving. A serving is 1 small fresh fruit, such as an apple or orange; ½ of a banana; ½ cup of cooked or canned fruit; ½ cup of fruit juice; 1 cup of melon or raspberries; or 2 tablespoons of dried fruit. ? Milk and no-sugar-added yogurt have 15 grams of carbs in a serving. A serving is 1 cup of milk or 2/3 cup of no-sugar-added yogurt. ? Starchy vegetables have 15 grams of carbs in a serving. A serving is ½ cup of mashed potatoes or sweet potato; 1 cup winter squash; ½ of a small baked potato; ½ cup of cooked beans; or ½ cup cooked corn or green peas. · Learn how much carbs to eat each day and at each meal. A dietitian or CDE can teach you how to keep track of the amount of carbs you eat. This is called carbohydrate counting. · If you are not sure how to count carbohydrate grams, use the Plate Method to plan meals. It is a good, quick way to make sure that you have a balanced meal. It also helps you spread carbs throughout the day. ? Divide your plate by types of foods. Put non-starchy vegetables on half the plate, meat or other protein food on one-quarter of the plate, and a grain or starchy vegetable in the final quarter of the plate. To this you can add a small piece of fruit and 1 cup of milk or yogurt, depending on how many carbs you are supposed to eat at a meal.  · Try to eat about the same amount of carbs at each meal. Do not \"save up\" your daily allowance of carbs to eat at one meal.  · Proteins have very little or no carbs per serving. Examples of proteins are beef, chicken, turkey, fish, eggs, tofu, cheese, cottage cheese, and peanut butter. A serving size of meat is 3 ounces, which is about the size of a deck of cards. Examples of meat substitute serving sizes (equal to 1 ounce of meat) are 1/4 cup of cottage cheese, 1 egg, 1 tablespoon of peanut butter, and ½ cup of tofu. How can you eat out and still eat healthy?   · Learn to estimate the serving sizes of foods that have carbohydrate. If you measure food at home, it will be easier to estimate the amount in a serving of restaurant food. · If the meal you order has too much carbohydrate (such as potatoes, corn, or baked beans), ask to have a low-carbohydrate food instead. Ask for a salad or green vegetables. · If you use insulin, check your blood sugar before and after eating out to help you plan how much to eat in the future. · If you eat more carbohydrate at a meal than you had planned, take a walk or do other exercise. This will help lower your blood sugar. What else should you know? · Limit saturated fat, such as the fat from meat and dairy products. This is a healthy choice because people who have diabetes are at higher risk of heart disease. So choose lean cuts of meat and nonfat or low-fat dairy products. Use olive or canola oil instead of butter or shortening when cooking. · Don't skip meals. Your blood sugar may drop too low if you skip meals and take insulin or certain medicines for diabetes. · Check with your doctor before you drink alcohol. Alcohol can cause your blood sugar to drop too low. Alcohol can also cause a bad reaction if you take certain diabetes medicines. Follow-up care is a key part of your treatment and safety. Be sure to make and go to all appointments, and call your doctor if you are having problems. It's also a good idea to know your test results and keep a list of the medicines you take. Where can you learn more? Go to http://diego-leonor.info/. Enter O970 in the search box to learn more about \"Learning About Diabetes Food Guidelines. \"  Current as of: April 16, 2019  Content Version: 12.2  © 5423-8012 [employer]. Care instructions adapted under license by [employer] (which disclaims liability or warranty for this information).  If you have questions about a medical condition or this instruction, always ask your healthcare professional. [place] [address] any warranty or liability for your use of this information. Follow-up and Dispositions    · Return in about 3 months (around 5/11/2020), or if symptoms worsen or fail to improve, for routine follow up.

## 2020-02-11 NOTE — PATIENT INSTRUCTIONS

## 2020-02-12 LAB
ALBUMIN SERPL-MCNC: 4.6 G/DL (ref 3.6–4.6)
ALBUMIN/GLOB SERPL: 1.7 {RATIO} (ref 1.2–2.2)
ALP SERPL-CCNC: 107 IU/L (ref 39–117)
ALT SERPL-CCNC: 15 IU/L (ref 0–44)
APPEARANCE UR: CLEAR
AST SERPL-CCNC: 19 IU/L (ref 0–40)
BILIRUB SERPL-MCNC: 0.5 MG/DL (ref 0–1.2)
BILIRUB UR QL STRIP: NEGATIVE
BUN SERPL-MCNC: 28 MG/DL (ref 8–27)
BUN/CREAT SERPL: 16 (ref 10–24)
CALCIUM SERPL-MCNC: 9.9 MG/DL (ref 8.6–10.2)
CHLORIDE SERPL-SCNC: 99 MMOL/L (ref 96–106)
CHOLEST SERPL-MCNC: 105 MG/DL (ref 100–199)
CO2 SERPL-SCNC: 20 MMOL/L (ref 20–29)
COLOR UR: YELLOW
CREAT SERPL-MCNC: 1.75 MG/DL (ref 0.76–1.27)
GLOBULIN SER CALC-MCNC: 2.7 G/DL (ref 1.5–4.5)
GLUCOSE SERPL-MCNC: 140 MG/DL (ref 65–99)
GLUCOSE UR QL: NEGATIVE
HDLC SERPL-MCNC: 32 MG/DL
HGB UR QL STRIP: NEGATIVE
INTERPRETATION: NORMAL
KETONES UR QL STRIP: NEGATIVE
LDLC SERPL CALC-MCNC: 40 MG/DL (ref 0–99)
LEUKOCYTE ESTERASE UR QL STRIP: NEGATIVE
Lab: NORMAL
MICRO URNS: NORMAL
NITRITE UR QL STRIP: NEGATIVE
PH UR STRIP: 5 [PH] (ref 5–7.5)
POTASSIUM SERPL-SCNC: 4.7 MMOL/L (ref 3.5–5.2)
PROT SERPL-MCNC: 7.3 G/DL (ref 6–8.5)
PROT UR QL STRIP: NORMAL
SODIUM SERPL-SCNC: 137 MMOL/L (ref 134–144)
SP GR UR: 1.02 (ref 1–1.03)
TRIGL SERPL-MCNC: 166 MG/DL (ref 0–149)
TSH SERPL DL<=0.005 MIU/L-ACNC: 1.13 UIU/ML (ref 0.45–4.5)
UROBILINOGEN UR STRIP-MCNC: 0.2 MG/DL (ref 0.2–1)
VLDLC SERPL CALC-MCNC: 33 MG/DL (ref 5–40)

## 2020-02-18 ENCOUNTER — TELEPHONE (OUTPATIENT)
Dept: FAMILY MEDICINE CLINIC | Age: 85
End: 2020-02-18

## 2020-02-18 NOTE — TELEPHONE ENCOUNTER
Amanda - dtr calling     States medication too expensive for patient - Diamante     Would like a substitute       Best number to reach her is [phone]

## 2020-02-20 ENCOUNTER — TELEPHONE (OUTPATIENT)
Dept: FAMILY MEDICINE CLINIC | Age: 85
End: 2020-02-20

## 2020-02-20 NOTE — TELEPHONE ENCOUNTER
----- Message from Justina Daniel sent at 2/20/2020 10:10 AM EST -----  Regarding: Dr. Gilmer Alex: [phone]  Patient's medication \"Avandia\" is very costly (225$ OOP) at [address] [place] [address], even using his prescription drug plan. Please contact patient's daughter, Salome Mann to discuss an alternative medication being prescribed.

## 2020-02-20 NOTE — TELEPHONE ENCOUNTER
Call patient's daughter Nissa Blanca, suggest her to call the insurance and ask them what medication that is on the affordable list.

## 2020-02-20 NOTE — TELEPHONE ENCOUNTER
----- Message from Snehal Spangler sent at 2/20/2020  2:08 PM EST -----  Regarding: Dr. Lizandro Wrightters: [phone]  [address] [place] [address] (if not patient): Kelvin SANDOVAL  Relationship of caller (if not patient): daughter  Best contact number(s): [phone]  Name of medication and dosage if known: an alternative Rx which is \"Pioglitazone\"  Is patient out of this medication (yes/no): n/a  Pharmacy name: [address] [place] [address] listed in chart? (yes/no): yes  Pharmacy phone number: n/a  Date of last visit: 2/11/20  Details to clarify the request: Requesting Nurse Sridhar Sosa call to confirm if the pcp can this alternative Rx for her father.

## 2020-02-21 RX ORDER — PIOGLITAZONEHYDROCHLORIDE 15 MG/1
15 TABLET ORAL DAILY
Qty: 30 TAB | Refills: 3 | Status: SHIPPED | OUTPATIENT
Start: 2020-02-21 | End: 2020-06-18

## 2020-03-09 ENCOUNTER — OFFICE VISIT (OUTPATIENT)
Dept: CARDIOLOGY CLINIC | Age: 85
End: 2020-03-09

## 2020-03-09 DIAGNOSIS — I49.5 SICK SINUS SYNDROME (HCC): ICD-10-CM

## 2020-03-09 DIAGNOSIS — Z95.0 CARDIAC PACEMAKER IN SITU: Primary | ICD-10-CM

## 2020-06-18 RX ORDER — PIOGLITAZONEHYDROCHLORIDE 15 MG/1
TABLET ORAL
Qty: 30 TAB | Refills: 2 | Status: SHIPPED | OUTPATIENT
Start: 2020-06-18 | End: 2020-10-01

## 2020-07-02 ENCOUNTER — OFFICE VISIT (OUTPATIENT)
Dept: CARDIOLOGY CLINIC | Age: 85
End: 2020-07-02

## 2020-07-02 DIAGNOSIS — Z95.0 CARDIAC PACEMAKER IN SITU: Primary | ICD-10-CM

## 2020-07-02 DIAGNOSIS — I49.5 SICK SINUS SYNDROME (HCC): ICD-10-CM

## 2020-10-01 RX ORDER — PIOGLITAZONEHYDROCHLORIDE 15 MG/1
TABLET ORAL
Qty: 30 TAB | Refills: 0 | Status: SHIPPED | OUTPATIENT
Start: 2020-10-01 | End: 2020-10-06 | Stop reason: SDUPTHER

## 2020-10-02 ENCOUNTER — TELEPHONE (OUTPATIENT)
Dept: FAMILY MEDICINE CLINIC | Age: 85
End: 2020-10-02

## 2020-10-02 NOTE — TELEPHONE ENCOUNTER
Per Dr Julio Moreno need appt for medicare wellness after 10/11/20. Attempted to reach patient by telephone. A message was left for return call.

## 2020-10-06 ENCOUNTER — VIRTUAL VISIT (OUTPATIENT)
Dept: FAMILY MEDICINE CLINIC | Age: 85
End: 2020-10-06
Payer: MEDICARE

## 2020-10-06 DIAGNOSIS — Z95.0 PACEMAKER: ICD-10-CM

## 2020-10-06 DIAGNOSIS — Z95.5 S/P CORONARY ARTERY STENT PLACEMENT: ICD-10-CM

## 2020-10-06 DIAGNOSIS — E78.00 HYPERCHOLESTEROLEMIA: ICD-10-CM

## 2020-10-06 DIAGNOSIS — F03.90 DEMENTIA WITHOUT BEHAVIORAL DISTURBANCE, UNSPECIFIED DEMENTIA TYPE: ICD-10-CM

## 2020-10-06 DIAGNOSIS — E55.9 VITAMIN D DEFICIENCY: ICD-10-CM

## 2020-10-06 DIAGNOSIS — I10 HYPERTENSION, WELL CONTROLLED: ICD-10-CM

## 2020-10-06 DIAGNOSIS — E03.9 ACQUIRED HYPOTHYROIDISM: ICD-10-CM

## 2020-10-06 PROCEDURE — 1101F PT FALLS ASSESS-DOCD LE1/YR: CPT | Performed by: INTERNAL MEDICINE

## 2020-10-06 PROCEDURE — G8432 DEP SCR NOT DOC, RNG: HCPCS | Performed by: INTERNAL MEDICINE

## 2020-10-06 PROCEDURE — G8938 BMI DOC ONL FUP NT DOC: HCPCS | Performed by: INTERNAL MEDICINE

## 2020-10-06 PROCEDURE — G8427 DOCREV CUR MEDS BY ELIG CLIN: HCPCS | Performed by: INTERNAL MEDICINE

## 2020-10-06 PROCEDURE — G8536 NO DOC ELDER MAL SCRN: HCPCS | Performed by: INTERNAL MEDICINE

## 2020-10-06 PROCEDURE — 99214 OFFICE O/P EST MOD 30 MIN: CPT | Performed by: INTERNAL MEDICINE

## 2020-10-06 RX ORDER — CHOLECALCIFEROL (VITAMIN D3) 125 MCG
5000 CAPSULE ORAL DAILY
Qty: 90 CAP | Refills: 1 | Status: SHIPPED | OUTPATIENT
Start: 2020-10-06 | End: 2021-07-27 | Stop reason: SDUPTHER

## 2020-10-06 RX ORDER — LEVOTHYROXINE SODIUM 100 UG/1
100 TABLET ORAL
Qty: 90 TAB | Refills: 1 | Status: SHIPPED | OUTPATIENT
Start: 2020-10-06 | End: 2021-07-27 | Stop reason: SDUPTHER

## 2020-10-06 RX ORDER — CLOPIDOGREL BISULFATE 75 MG/1
75 TABLET ORAL DAILY
Qty: 90 TAB | Refills: 1 | Status: SHIPPED | OUTPATIENT
Start: 2020-10-06 | End: 2021-07-27 | Stop reason: SDUPTHER

## 2020-10-06 RX ORDER — PIOGLITAZONEHYDROCHLORIDE 15 MG/1
15 TABLET ORAL DAILY
Qty: 90 TAB | Refills: 1 | Status: SHIPPED
Start: 2020-10-06 | End: 2021-07-27 | Stop reason: SINTOL

## 2020-10-06 RX ORDER — DONEPEZIL HYDROCHLORIDE 5 MG/1
5 TABLET, FILM COATED ORAL
Qty: 90 TAB | Refills: 1 | Status: SHIPPED | OUTPATIENT
Start: 2020-10-06 | End: 2021-07-27 | Stop reason: SDUPTHER

## 2020-10-06 RX ORDER — METOPROLOL SUCCINATE 50 MG/1
50 TABLET, EXTENDED RELEASE ORAL DAILY
Qty: 90 TAB | Refills: 1 | Status: SHIPPED | OUTPATIENT
Start: 2020-10-06 | End: 2021-07-27 | Stop reason: SDUPTHER

## 2020-10-06 RX ORDER — ASPIRIN 81 MG/1
81 TABLET ORAL DAILY
Qty: 90 TAB | Refills: 1 | Status: SHIPPED | OUTPATIENT
Start: 2020-10-06 | End: 2021-07-27 | Stop reason: SDUPTHER

## 2020-10-06 RX ORDER — CHLORTHALIDONE 25 MG/1
12.5 TABLET ORAL DAILY
COMMUNITY
End: 2020-10-06 | Stop reason: SDUPTHER

## 2020-10-06 RX ORDER — GLYBURIDE 5 MG/1
10 TABLET ORAL 2 TIMES DAILY WITH MEALS
Qty: 360 TAB | Refills: 1 | Status: SHIPPED
Start: 2020-10-06 | End: 2021-07-09

## 2020-10-06 RX ORDER — AMLODIPINE BESYLATE AND BENAZEPRIL HYDROCHLORIDE 10; 40 MG/1; MG/1
1 CAPSULE ORAL DAILY
Qty: 90 CAP | Refills: 1 | Status: SHIPPED
Start: 2020-10-06 | End: 2021-07-27 | Stop reason: DRUGHIGH

## 2020-10-06 RX ORDER — ATORVASTATIN CALCIUM 40 MG/1
40 TABLET, FILM COATED ORAL DAILY
Qty: 90 TAB | Refills: 1 | Status: SHIPPED | OUTPATIENT
Start: 2020-10-06 | End: 2021-07-27 | Stop reason: SDUPTHER

## 2020-10-06 RX ORDER — CHLORTHALIDONE 25 MG/1
12.5 TABLET ORAL DAILY
Qty: 45 TAB | Refills: 1 | Status: SHIPPED | OUTPATIENT
Start: 2020-10-06 | End: 2021-07-27

## 2020-10-06 NOTE — PROGRESS NOTES
Chief Complaint   Patient presents with    Hypertension    Diabetes    Follow Up Chronic Condition     HPI:  Noah Case is a 80 y.o. male who was seen by synchronous (real-time) audio-video technology on 10/6/2020 for Hypertension; Diabetes; and Follow Up Chronic Condition    Assessment & Plan:   Diagnoses and all orders for this visit:    1. Hypertension, well controlled  -     chlorthalidone (HYGROTON) 25 mg tablet; Take 0.5 Tabs by mouth daily. -     amLODIPine-benazepril (LOTREL) 10-40 mg per capsule; Take 1 Cap by mouth daily. -     metoprolol succinate (TOPROL-XL) 50 mg XL tablet; Take 1 Tab by mouth daily. -     aspirin delayed-release 81 mg tablet; Take 1 Tab by mouth daily. 2. S/P coronary artery stent placement  -     atorvastatin (Lipitor) 40 mg tablet; Take 1 Tab by mouth daily. -     clopidogreL (PLAVIX) 75 mg tab; Take 1 Tab by mouth daily. -     aspirin delayed-release 81 mg tablet; Take 1 Tab by mouth daily. 3. Hypercholesterolemia  -     atorvastatin (Lipitor) 40 mg tablet; Take 1 Tab by mouth daily. 4. Vitamin D deficiency  -     cholecalciferol (VITAMIN D3) (5000 Units /125 mcg) capsule; Take 1 Cap by mouth daily. 5. Pacemaker  -     clopidogreL (PLAVIX) 75 mg tab; Take 1 Tab by mouth daily. 6. Dementia without behavioral disturbance, unspecified dementia type ([facility]  -     donepeziL (ARICEPT) 5 mg tablet; Take 1 Tab by mouth nightly. 7. Uncontrolled type 2 diabetes mellitus with diabetic nephropathy (HCC)  -     pioglitazone (ACTOS) 15 mg tablet; Take 1 Tab by mouth daily. -     atorvastatin (Lipitor) 40 mg tablet; Take 1 Tab by mouth daily. -     glyBURIDE (DIABETA) 5 mg tablet; Take 2 Tabs by mouth two (2) times daily (with meals). -     aspirin delayed-release 81 mg tablet; Take 1 Tab by mouth daily. 8. Acquired hypothyroidism  -     levothyroxine (SYNTHROID) 100 mcg tablet; Take 1 Tab by mouth Daily (before breakfast).         I spent at least 20 minutes on this visit with this established patient. [id number]  Subjective: Chuy Alex is a 80 y.o. male      Prior to Admission medications    Medication Sig Start Date End Date Taking? Authorizing Provider   chlorthalidone (HYGROTON) 25 mg tablet Take 0.5 Tabs by mouth daily. 10/6/20  Yes Markie Bustos MD   pioglitazone (ACTOS) 15 mg tablet Take 1 Tab by mouth daily. 10/6/20  Yes Markie Bustos MD   amLODIPine-benazepril (LOTREL) 10-40 mg per capsule Take 1 Cap by mouth daily. 10/6/20  Yes Markie Bustos MD   atorvastatin (Lipitor) 40 mg tablet Take 1 Tab by mouth daily. 10/6/20  Yes Markie Bustos MD   cholecalciferol (VITAMIN D3) (5000 Units /125 mcg) capsule Take 1 Cap by mouth daily. 10/6/20  Yes Markie Bustos MD   clopidogreL (PLAVIX) 75 mg tab Take 1 Tab by mouth daily. 10/6/20  Yes Markie Bustos MD   donepeziL (ARICEPT) 5 mg tablet Take 1 Tab by mouth nightly. 10/6/20  Yes Markie Bustos MD   glyBURIDE (DIABETA) 5 mg tablet Take 2 Tabs by mouth two (2) times daily (with meals). 10/6/20  Yes Markie Bustos MD   levothyroxine (SYNTHROID) 100 mcg tablet Take 1 Tab by mouth Daily (before breakfast). 10/6/20  Yes Markie Bustos MD   metoprolol succinate (TOPROL-XL) 50 mg XL tablet Take 1 Tab by mouth daily. 10/6/20  Yes Markie Bustos MD   aspirin delayed-release 81 mg tablet Take 1 Tab by mouth daily. 10/6/20  Yes Markie Bustos MD   chlorthalidone (HYGROTON) 25 mg tablet Take 12.5 mg by mouth daily. 10/6/20  Provider, Historical   pioglitazone (ACTOS) 15 mg tablet TAKE ONE TABLET BY MOUTH DAILY 10/1/20 10/6/20  Fercho Dietrich MD   amLODIPine-benazepril (LOTREL) 10-40 mg per capsule Take 1 Cap by mouth daily. 2/11/20 10/6/20  Fercho Dietrich MD   atorvastatin (LIPITOR) 40 mg tablet Take 1 Tab by mouth daily. 2/11/20 10/6/20  Fercho Dietrich MD   chlorthalidone (HYGROTEN) 25 mg tablet Take 1 Tab by mouth daily.  2/11/20 10/6/20  Markie Bustos MD   cholecalciferol (VITAMIN D3) (5000 Units /125 mcg) capsule Take 1 Cap by mouth daily. 2/11/20 10/6/20  Dave Dietrich MD   clopidogreL (PLAVIX) 75 mg tab Take 1 Tab by mouth daily. 2/11/20 10/6/20  Dave Dietrich MD   donepeziL (ARICEPT) 5 mg tablet Take 1 Tab by mouth nightly. 2/11/20 10/6/20  Dave Dietrich MD   glyBURIDE (DIABETA) 5 mg tablet Take 2 Tabs by mouth two (2) times daily (with meals). 2/11/20 10/6/20  Jael Pulido MD   levothyroxine (SYNTHROID) 100 mcg tablet Take 1 Tab by mouth Daily (before breakfast). 2/11/20 10/6/20  Dave Dietrich MD   metoprolol succinate (TOPROL-XL) 50 mg XL tablet Take 1 Tab by mouth daily. 2/11/20 10/6/20  Jael Pulido MD   aspirin delayed-release 81 mg tablet Take  by mouth daily. 10/6/20  Provider, Historical     Patient Active Problem List   Diagnosis Code    Hypertension I10    Coronary artery disease involving native coronary artery of native heart I25.10    Controlled type 2 diabetes mellitus, without long-term current use of insulin ([facility]) E11.9    Pacemaker Z95.0    Elevated blood pressure affecting pregnancy in first trimester, antepartum O16.1    Angina, class III ([facility]) I20.9    Bilateral carotid artery stenosis I65.23    CKD (chronic kidney disease) N18.9    S/P cardiac cath Z98.890    S/P coronary artery stent placement Z95.5     Current Outpatient Medications   Medication Sig Dispense Refill    chlorthalidone (HYGROTON) 25 mg tablet Take 0.5 Tabs by mouth daily. 45 Tab 1    pioglitazone (ACTOS) 15 mg tablet Take 1 Tab by mouth daily. 90 Tab 1    amLODIPine-benazepril (LOTREL) 10-40 mg per capsule Take 1 Cap by mouth daily. 90 Cap 1    atorvastatin (Lipitor) 40 mg tablet Take 1 Tab by mouth daily. 90 Tab 1    cholecalciferol (VITAMIN D3) (5000 Units /125 mcg) capsule Take 1 Cap by mouth daily. 90 Cap 1    clopidogreL (PLAVIX) 75 mg tab Take 1 Tab by mouth daily. 90 Tab 1    donepeziL (ARICEPT) 5 mg tablet Take 1 Tab by mouth nightly.  90 Tab 1    glyBURIDE (DIABETA) 5 mg tablet Take 2 Tabs by mouth two (2) times daily (with meals). 360 Tab 1    levothyroxine (SYNTHROID) 100 mcg tablet Take 1 Tab by mouth Daily (before breakfast). 90 Tab 1    metoprolol succinate (TOPROL-XL) 50 mg XL tablet Take 1 Tab by mouth daily. 90 Tab 1    aspirin delayed-release 81 mg tablet Take 1 Tab by mouth daily. 80 Tab 1     No Known Allergies  Past Medical History:   Diagnosis Date    CAD (coronary artery disease)     CKD (chronic kidney disease) 2019    Diabetes ([facility]     Essential hypertension     Hypercholesterolemia     S/P cardiac cath 2019    S/P coronary artery stent placement 2019 PCI/JOSE to distal LMT, ,prox LAD and LCX, distal RCA    Thyroid disease      Past Surgical History:   Procedure Laterality Date    HX PACEMAKER       Family History   Problem Relation Age of Onset    Diabetes Mother     Heart Surgery Father     No Known Problems Sister     No Known Problems Brother     No Known Problems Sister     No Known Problems Sister      Social History     Tobacco Use    Smoking status: Former Smoker     Last attempt to quit: 1979     Years since quittin.1    Smokeless tobacco: Never Used   Substance Use Topics    Alcohol use: Not Currently     ROS  As per hpi    Objective:   No flowsheet data found. General: alert, cooperative, no distress   Mental  status: normal mood, behavior, speech, dress, motor activity, and thought processes, able to follow commands   HENT: NCAT   Neck: no visualized mass   Resp: no respiratory distress   Neuro: no gross deficits   Skin: no discoloration or lesions of concern on visible areas     Additional exam findings: We discussed the expected course, resolution and complications of the diagnosis(es) in detail. Medication risks, benefits, costs, interactions, and alternatives were discussed as indicated. I advised him to contact the office if his condition worsens, changes or fails to improve as anticipated.  He expressed understanding with the diagnosis(es) and plan. Jaymie Dunham, who was evaluated through a patient-initiated, synchronous (real-time) audio-video encounter, and/or his healthcare decision maker, is aware that it is a billable service, with coverage as determined by his insurance carrier. He provided verbal consent to proceed: Yes, and patient identification was verified. It was conducted pursuant to the emergency declaration under the [address] [place] [address] and the [employer] General Act. A caregiver was present when appropriate. Ability to conduct physical exam was limited. I was in the office. The patient was at home.       Jahaira Lr MD

## 2020-10-21 ENCOUNTER — HOSPITAL ENCOUNTER (OUTPATIENT)
Dept: LAB | Age: 85
Discharge: HOME | End: 2020-10-21
Payer: MEDICARE

## 2020-10-21 ENCOUNTER — OFFICE VISIT (OUTPATIENT)
Dept: FAMILY MEDICINE CLINIC | Age: 85
End: 2020-10-21
Payer: MEDICARE

## 2020-10-21 VITALS
TEMPERATURE: 96.9 F | DIASTOLIC BLOOD PRESSURE: 60 MMHG | WEIGHT: 187 LBS | HEIGHT: 71 IN | OXYGEN SATURATION: 92 % | SYSTOLIC BLOOD PRESSURE: 106 MMHG | HEART RATE: 63 BPM | BODY MASS INDEX: 26.18 KG/M2

## 2020-10-21 DIAGNOSIS — E03.9 ACQUIRED HYPOTHYROIDISM: ICD-10-CM

## 2020-10-21 DIAGNOSIS — E11.51 TYPE 2 DIABETES MELLITUS WITH PERIPHERAL VASCULAR DISEASE (HCC): ICD-10-CM

## 2020-10-21 DIAGNOSIS — Z00.00 ENCOUNTER FOR MEDICARE ANNUAL WELLNESS EXAM: Primary | ICD-10-CM

## 2020-10-21 DIAGNOSIS — I10 HYPERTENSION, WELL CONTROLLED: ICD-10-CM

## 2020-10-21 DIAGNOSIS — Z00.00 ENCOUNTER FOR MEDICARE ANNUAL WELLNESS EXAM: ICD-10-CM

## 2020-10-21 DIAGNOSIS — E78.00 HYPERCHOLESTEROLEMIA: ICD-10-CM

## 2020-10-21 DIAGNOSIS — E55.9 VITAMIN D DEFICIENCY: ICD-10-CM

## 2020-10-21 DIAGNOSIS — Z23 NEEDS FLU SHOT: ICD-10-CM

## 2020-10-21 DIAGNOSIS — Z13.5 GLAUCOMA SCREENING: ICD-10-CM

## 2020-10-21 DIAGNOSIS — E11.9 DIABETIC EYE EXAM (HCC): ICD-10-CM

## 2020-10-21 DIAGNOSIS — Z71.89 ADVANCED CARE PLANNING/COUNSELING DISCUSSION: ICD-10-CM

## 2020-10-21 DIAGNOSIS — Z23 ENCOUNTER FOR IMMUNIZATION: ICD-10-CM

## 2020-10-21 DIAGNOSIS — R35.0 FREQUENCY OF URINATION: ICD-10-CM

## 2020-10-21 DIAGNOSIS — E11.21 TYPE 2 DIABETES WITH NEPHROPATHY (HCC): ICD-10-CM

## 2020-10-21 DIAGNOSIS — Z01.00 DIABETIC EYE EXAM (HCC): ICD-10-CM

## 2020-10-21 DIAGNOSIS — Z12.5 SCREENING FOR PROSTATE CANCER: ICD-10-CM

## 2020-10-21 PROBLEM — N18.30 CKD (CHRONIC KIDNEY DISEASE) STAGE 3, GFR 30-59 ML/MIN (HCC): Status: ACTIVE | Noted: 2020-10-21

## 2020-10-21 PROCEDURE — 84443 ASSAY THYROID STIM HORMONE: CPT

## 2020-10-21 PROCEDURE — 82043 UR ALBUMIN QUANTITATIVE: CPT

## 2020-10-21 PROCEDURE — 81003 URINALYSIS AUTO W/O SCOPE: CPT

## 2020-10-21 PROCEDURE — 36415 COLL VENOUS BLD VENIPUNCTURE: CPT

## 2020-10-21 PROCEDURE — 84153 ASSAY OF PSA TOTAL: CPT

## 2020-10-21 PROCEDURE — 85027 COMPLETE CBC AUTOMATED: CPT

## 2020-10-21 PROCEDURE — G8938 BMI DOC ONL FUP NT DOC: HCPCS | Performed by: INTERNAL MEDICINE

## 2020-10-21 PROCEDURE — 1158F ADVNC CARE PLAN TLK DOCD: CPT | Performed by: INTERNAL MEDICINE

## 2020-10-21 PROCEDURE — 82306 VITAMIN D 25 HYDROXY: CPT

## 2020-10-21 PROCEDURE — 83036 HEMOGLOBIN GLYCOSYLATED A1C: CPT

## 2020-10-21 PROCEDURE — G0439 PPPS, SUBSEQ VISIT: HCPCS | Performed by: INTERNAL MEDICINE

## 2020-10-21 PROCEDURE — 1101F PT FALLS ASSESS-DOCD LE1/YR: CPT | Performed by: INTERNAL MEDICINE

## 2020-10-21 PROCEDURE — G8536 NO DOC ELDER MAL SCRN: HCPCS | Performed by: INTERNAL MEDICINE

## 2020-10-21 PROCEDURE — 90694 VACC AIIV4 NO PRSRV 0.5ML IM: CPT

## 2020-10-21 PROCEDURE — 80061 LIPID PANEL: CPT

## 2020-10-21 PROCEDURE — G8427 DOCREV CUR MEDS BY ELIG CLIN: HCPCS | Performed by: INTERNAL MEDICINE

## 2020-10-21 PROCEDURE — 80053 COMPREHEN METABOLIC PANEL: CPT

## 2020-10-21 PROCEDURE — G8510 SCR DEP NEG, NO PLAN REQD: HCPCS | Performed by: INTERNAL MEDICINE

## 2020-10-21 RX ORDER — ZOSTER VACCINE RECOMBINANT, ADJUVANTED 50 MCG/0.5
0.5 KIT INTRAMUSCULAR ONCE
Qty: 0.5 ML | Refills: 1 | Status: SHIPPED | OUTPATIENT
Start: 2020-10-21 | End: 2020-10-21

## 2020-10-21 NOTE — ACP (ADVANCE CARE PLANNING)
Advance Care Planning       Advance Care Planning (ACP) Physician/NP/PA (Provider) Conversation    Date of ACP Conversation: 10/21/2020    Conversation Conducted with:   Patient with Decision Making [address] Maker:    Current Designated Health Care Decision Maker:   (If there is a valid [place] named in the [address]" box in the ACP activity, but it is not visible above, be sure to open that field and then select the health care decision maker relationship (ie \"primary\") in the blank space to the right of the name.)    Note: Assess and validate information in current ACP documents, as indicated. If no Authorized Decision Maker has previously been identified, then patient chooses [place]:  \"Who would you like to name as your primary health care decision-maker? \"    Name: Aria Ribera  Relationship: daughter Phone number: [phone]  [employer] this person be reached easily? \" YES    \"Who would you like to name as your back-up decision maker? \"   Name: Osmany Villalba Relationship: granddaughterPhone number: [phone]  [employer] this person be reached easily? \" YES    Note: If the relationship of these Decision-Makers to the patient does NOT follow your state's Next of Kin hierarchy, recommend that patient complete ACP document that meets state-specific requirements to allow them to act on the patient's behalf when appropriate. Care Preferences:    Hospitalization: \"If your health worsens and it becomes clear that your chance of recovery is unlikely, what would your preference be regarding hospitalization? \"yes    Ventilation: \"If you were in your present state of health and suddenly became very ill and were unable to breathe on your own, what would your preference be about the use of a ventilator (breathing machine) if it was available to you? \"  Yes    \"If your health worsens and it becomes clear that your chance of recovery is unlikely, what would your preference be about the use of a ventilator (breathing machine) if it was available to you? \" No    Resuscitation:  \"CPR works best to restart the heart when there is a sudden event, like a heart attack, in someone who is otherwise healthy. Unfortunately, CPR does not typically restart the heart for people who have serious health conditions or who are very sick. \"    \"In the event your heart stopped as a result of an underlying serious health condition, would you want attempts to be made to restart your heart (answer \"yes\" for attempt to resuscitate) or would you prefer a natural death (answer \"no\" for do not attempt to resuscitate)? \" Yes      NOTE: If the patient has a valid advance directive AND provides care preference(s) that are inconsistent with that prior directive, advise the patient to consider either: creating a new advance directive that complies with state-specific requirements; or, if that is not possible, orally revoking that prior directive in accordance with state-specific requirements, which must be documented in the EHR.     Conversation Outcomes / Follow-Up Plan:   Recommended completion of Advance Directive      Length of Voluntary ACP Conversation in minutes:  25 minutes      Nickolas Mcgowan MD

## 2020-10-21 NOTE — PROGRESS NOTES
Chief Complaint   Patient presents with    Annual Wellness Visit     HPI:  Eder Mortensen is a 80 y.o.  male with h/o hypertension, hypercholesterolemia, diabetes type 2, CKD, CAD, s/p cardiac cath, bilateral carotid artery stenosis presents accompanied by daughter for follow up  Patient has been doing well. Blood pressure is at goal. Last A1C showed poorly controlled diabetes. Patient has no complaints at this time. Advance care planning has been discussed and documented in the system. This is a Subsequent Medicare Annual Wellness Exam (AWV) (Performed 12 months after IPPE or effective date of Medicare Part B enrollment)  I have reviewed the patient's medical history in detail and updated the computerized patient record. History     Past Medical History:   Diagnosis Date    CAD (coronary artery disease)     CKD (chronic kidney disease) 9/24/2019    Diabetes ([address]     Essential hypertension     Hypercholesterolemia     S/P cardiac cath 9/24/2019    S/P coronary artery stent placement 9/25/2019 9/24/19 PCI/JOSE to distal LMT, ,prox LAD and LCX, distal RCA    Thyroid disease       Past Surgical History:   Procedure Laterality Date    HX PACEMAKER       Current Outpatient Medications   Medication Sig Dispense Refill    chlorthalidone (HYGROTON) 25 mg tablet Take 0.5 Tabs by mouth daily. 45 Tab 1    pioglitazone (ACTOS) 15 mg tablet Take 1 Tab by mouth daily. 90 Tab 1    amLODIPine-benazepril (LOTREL) 10-40 mg per capsule Take 1 Cap by mouth daily. 90 Cap 1    atorvastatin (Lipitor) 40 mg tablet Take 1 Tab by mouth daily. 90 Tab 1    cholecalciferol (VITAMIN D3) (5000 Units /125 mcg) capsule Take 1 Cap by mouth daily. 90 Cap 1    clopidogreL (PLAVIX) 75 mg tab Take 1 Tab by mouth daily. 90 Tab 1    donepeziL (ARICEPT) 5 mg tablet Take 1 Tab by mouth nightly. 90 Tab 1    glyBURIDE (DIABETA) 5 mg tablet Take 2 Tabs by mouth two (2) times daily (with meals).  360 Tab 1    levothyroxine (SYNTHROID) 100 mcg tablet Take 1 Tab by mouth Daily (before breakfast). 90 Tab 1    metoprolol succinate (TOPROL-XL) 50 mg XL tablet Take 1 Tab by mouth daily. 90 Tab 1    aspirin delayed-release 81 mg tablet Take 1 Tab by mouth daily. 80 Tab 1     No Known Allergies  Family History   Problem Relation Age of Onset    Diabetes Mother     Heart Surgery Father     No Known Problems Sister     No Known Problems Brother     No Known Problems Sister     No Known Problems Sister      Social History     Tobacco Use    Smoking status: Former Smoker     Last attempt to quit: 1979     Years since quittin.1    Smokeless tobacco: Never Used   Substance Use Topics    Alcohol use: Not Currently     Patient Active Problem List   Diagnosis Code    Hypertension I10    Coronary artery disease involving native coronary artery of native heart I25.10    Controlled type 2 diabetes mellitus, without long-term current use of insulin ([facility]) E11.9    Pacemaker Z95.0    Elevated blood pressure affecting pregnancy in first trimester, antepartum O16.1    Angina, class III ([facility]) I20.9    Bilateral carotid artery stenosis I65.23    CKD (chronic kidney disease) N18.9    S/P cardiac cath Z98.890    S/P coronary artery stent placement Z95.5    Type 2 diabetes with nephropathy ([facility]) E11.21    Type 2 diabetes mellitus with peripheral vascular disease ([facility] E11.51       Depression Risk Factor Screening:     3 most recent PHQ Screens 10/21/2020   Little interest or pleasure in doing things Not at all   Feeling down, depressed, irritable, or hopeless Not at all   Total Score PHQ 2 0     Alcohol Risk Factor Screening:   Do you average more than 1 drink per night or more than 7 drinks a week:  No    On any one occasion in the past three months have you have had more than 3 drinks containing alcohol:  No    Functional Ability and Level of Safety:   Hearing Loss  Hearing is good.     Activities of Daily Living  The home contains: no safety equipment. Patient does total self care    Fall Risk  Fall Risk Assessment, last 12 mths 10/21/2020   Able to walk? Yes   Fall in past 12 months? No       Abuse Screen  Patient is not abused    Cognitive Screening   Evaluation of Cognitive Function:  Has your family/caregiver stated any concerns about your memory: no  Normal    Patient Care Team   Patient Care Team:  Margret White MD as PCP - General (Internal Medicine)  Margret White MD as PCP - [facility]  Gia Nguyen MD as Physician (Cardiology)    Assessment/Plan   Education and counseling provided:  End-of-Life planning (with patient's consent)  Diagnoses and all orders for this visit:    Encounter for Medicare annual wellness exam  -     METABOLIC PANEL, COMPREHENSIVE; Future  -     CBC W/O DIFF    Encounter for immunization  -     varicella-zoster recombinant, PF, (Shingrix, PF,) 50 mcg/0.5 mL susr injection; 0.5 mL by IntraMUSCular route once for 1 dose., Print, Disp-0.5 mL,R-1    Needs flu shot  -     FLU (FLUAD QUAD INFLUENZA VACCINE,QUAD,ADJUVANTED)    Hypertension, well controlled  -     METABOLIC PANEL, COMPREHENSIVE; Future    Hypercholesterolemia  -     LIPID PANEL    Vitamin D deficiency  -     VITAMIN D, 25 HYDROXY    Acquired hypothyroidism  -     TSH 3RD GENERATION    Frequency of urination  -     URINALYSIS W/ RFLX MICROSCOPIC    Screening for prostate cancer  -     PSA SCREENING (SCREENING)    Type 2 diabetes with nephropathy (HCC)  -     HEMOGLOBIN A1C WITH EAG  -     MICROALBUMIN, UR, RAND W/ MICROALB/CREAT RATIO    Type 2 diabetes mellitus with peripheral vascular disease (HCC)  -     METABOLIC PANEL, COMPREHENSIVE;  Future  -     HEMOGLOBIN A1C WITH EAG  -     MICROALBUMIN, UR, RAND W/ MICROALB/CREAT RATIO    Diabetic eye exam ([facility]  -     REFERRAL TO OPHTHALMOLOGY    Glaucoma screening  -     REFERRAL TO OPHTHALMOLOGY    Advanced care planning/counseling discussion  - [place] ADVANCE CARE PLANNING DISCUSS DOCUMENTED IN MEDICAL RECORD    Other orders  -     Cancel: ZOSTER VACC RECOMBINANT ADJUVANTED      Patient Instructions        Advance Care Planning: Care Instructions  Your Care Instructions     It can be hard to live with an illness that cannot be cured. But if your health is getting worse, you may want to make decisions about end-of-life care. Planning for the end of your life does not mean that you are giving up. It is a way to make sure that your wishes are met. Clearly stating your wishes can make it easier for your loved ones. Making plans while you are still able may also ease your mind and make your final days less stressful and more meaningful. Follow-up care is a key part of your treatment and safety. Be sure to make and go to all appointments, and call your doctor if you are having problems. It's also a good idea to know your test results and keep a list of the medicines you take. What can you do to plan for the end of life? · You can bring these issues up with your doctor. You do not need to wait until your doctor starts the conversation. You might start with \"I would not be willing to live with . Derril Betsy Derril [place] Derril Betsy \" When you complete this sentence it helps your doctor understand your wishes. · Talk openly and honestly with your doctor. This is the best way to understand the decisions you will need to make as your health changes. Know that you can always change your mind. · Ask your doctor about commonly used life-support measures. These include tube feedings, breathing machines, and fluids given through a vein (IV). Understanding these treatments will help you decide whether you want them. · You may choose to have these life-supporting treatments for a limited time. This allows a trial period to see whether they will help you. You may also decide that you want your doctor to take only certain measures to keep you alive.  It is important to spell out these conditions so that your doctor and family understand them. · Talk to your doctor about how long you are likely to live. He or she may be able to give you an idea of what usually happens with your specific illness. · Think about preparing papers that state your wishes. This way there will not be any confusion about what you want. You can change your instructions at any time. Which papers should you prepare? Advance directives are legal papers that tell doctors how you want to be cared for at the end of your life. You do not need a  to write these papers. Ask your doctor or your state [facility] department for information on how to write your advance directives. They may have the forms for each of these types of papers. Make sure your doctor has a copy of these on file, and give a copy to a family member or close friend. · Consider a do-not-resuscitate order (DNR). This order asks that no extra treatments be done if your heart stops or you stop breathing. Extra treatments may include cardiopulmonary resuscitation (CPR), electrical shock to restart your heart, or a machine to breathe for you. If you decide to have a DNR order, ask your doctor to explain and write it. Place the order in your home where everyone can easily see it. · Consider a living will. A living will explains your wishes about life support and other treatments at the end of your life if you become unable to speak for yourself. Living layne tell doctors to use or not use treatments that would keep you alive. You must have one or two witnesses or a notary present when you sign this form. A living will may be called something else in your state. · Consider a medical power of . This form allows you to name a person to make decisions about your care if you are not able to. Most people ask a close friend or family member. Talk to this person about the kinds of treatments you want and those that you do not want. Make sure this person understands your wishes.  A medical power of  may be called something else in your state. These legal papers are simple to change. Tell your doctor what you want to change, and ask him or her to make a note in your medical file. Give your family updated copies of the papers. Where can you learn more? Go to http://www.mobley.com/  Enter P184 in the search box to learn more about \"Advance Care Planning: Care Instructions. \"  Current as of: December 9, 2019               Content Version: 12.6  © 0076-0136 [employer]. Care instructions adapted under license by [employer] (which disclaims liability or warranty for this information). If you have questions about a medical condition or this instruction, always ask your healthcare professional. [address] any warranty or liability for your use of this information. Follow-up and Dispositions    · Return in about 4 months (around 2/21/2021), or if symptoms worsen or fail to improve, for routine follow up.

## 2020-10-21 NOTE — PATIENT INSTRUCTIONS
Advance Care Planning: Care Instructions Your Care Instructions It can be hard to live with an illness that cannot be cured. But if your health is getting worse, you may want to make decisions about end-of-life care. Planning for the end of your life does not mean that you are giving up. It is a way to make sure that your wishes are met. Clearly stating your wishes can make it easier for your loved ones. Making plans while you are still able may also ease your mind and make your final days less stressful and more meaningful. Follow-up care is a key part of your treatment and safety. Be sure to make and go to all appointments, and call your doctor if you are having problems. It's also a good idea to know your test results and keep a list of the medicines you take. What can you do to plan for the end of life? · You can bring these issues up with your doctor. You do not need to wait until your doctor starts the conversation. You might start with \"I would not be willing to live with . Ronold Kanner Ronold Kanner Ronold Kanner \" When you complete this sentence it helps your doctor understand your wishes. · Talk openly and honestly with your doctor. This is the best way to understand the decisions you will need to make as your health changes. Know that you can always change your mind. · Ask your doctor about commonly used life-support measures. These include tube feedings, breathing machines, and fluids given through a vein (IV). Understanding these treatments will help you decide whether you want them. · You may choose to have these life-supporting treatments for a limited time. This allows a trial period to see whether they will help you. You may also decide that you want your doctor to take only certain measures to keep you alive. It is important to spell out these conditions so that your doctor and family understand them. · Talk to your doctor about how long you are likely to live.  He or she may be able to give you an idea of what usually happens with your specific illness. · Think about preparing papers that state your wishes. This way there will not be any confusion about what you want. You can change your instructions at any time. Which papers should you prepare? Advance directives are legal papers that tell doctors how you want to be cared for at the end of your life. You do not need a  to write these papers. Ask your doctor or your state health department for information on how to write your advance directives. They may have the forms for each of these types of papers. Make sure your doctor has a copy of these on file, and give a copy to a family member or close friend. · Consider a do-not-resuscitate order (DNR). This order asks that no extra treatments be done if your heart stops or you stop breathing. Extra treatments may include cardiopulmonary resuscitation (CPR), electrical shock to restart your heart, or a machine to breathe for you. If you decide to have a DNR order, ask your doctor to explain and write it. Place the order in your home where everyone can easily see it. · Consider a living will. A living will explains your wishes about life support and other treatments at the end of your life if you become unable to speak for yourself. Living layne tell doctors to use or not use treatments that would keep you alive. You must have one or two witnesses or a notary present when you sign this form. A living will may be called something else in your state. · Consider a medical power of . This form allows you to name a person to make decisions about your care if you are not able to. Most people ask a close friend or family member. Talk to this person about the kinds of treatments you want and those that you do not want. Make sure this person understands your wishes. A medical power of  may be called something else in your state. These legal papers are simple to change.  Tell your doctor what you want to change, and ask him or her to make a note in your medical file. Give your family updated copies of the papers. Where can you learn more? Go to http://www.[employer].com/ Enter P184 in the search box to learn more about \"Advance Care Planning: Care Instructions. \" Current as of: December 9, 2019               Content Version: 12.6 © 1000-8051 [employer]. Care instructions adapted under license by [employer] (which disclaims liability or warranty for this information). If you have questions about a medical condition or this instruction, always ask your healthcare professional. [address] any warranty or liability for your use of this information.

## 2020-10-22 LAB
25(OH)D3+25(OH)D2 SERPL-MCNC: 61.7 NG/ML (ref 30–100)
ALBUMIN SERPL-MCNC: 4.3 G/DL (ref 3.6–4.6)
ALBUMIN/CREAT UR: 17 MG/G CREAT (ref 0–29)
ALBUMIN/GLOB SERPL: 1.5 {RATIO} (ref 1.2–2.2)
ALP SERPL-CCNC: 102 IU/L (ref 39–117)
ALT SERPL-CCNC: 13 IU/L (ref 0–44)
APPEARANCE UR: CLEAR
AST SERPL-CCNC: 16 IU/L (ref 0–40)
BILIRUB SERPL-MCNC: 0.5 MG/DL (ref 0–1.2)
BILIRUB UR QL STRIP: NEGATIVE
BUN SERPL-MCNC: 36 MG/DL (ref 8–27)
BUN/CREAT SERPL: 17 (ref 10–24)
CALCIUM SERPL-MCNC: 9.4 MG/DL (ref 8.6–10.2)
CHLORIDE SERPL-SCNC: 100 MMOL/L (ref 96–106)
CHOLEST SERPL-MCNC: 98 MG/DL (ref 100–199)
CO2 SERPL-SCNC: 22 MMOL/L (ref 20–29)
COLOR UR: YELLOW
CREAT SERPL-MCNC: 2.06 MG/DL (ref 0.76–1.27)
CREAT UR-MCNC: 195.3 MG/DL
ERYTHROCYTE [DISTWIDTH] IN BLOOD BY AUTOMATED COUNT: 14 % (ref 11.6–15.4)
EST. AVERAGE GLUCOSE BLD GHB EST-MCNC: 148 MG/DL
GLOBULIN SER CALC-MCNC: 2.8 G/DL (ref 1.5–4.5)
GLUCOSE SERPL-MCNC: 103 MG/DL (ref 65–99)
GLUCOSE UR QL: NEGATIVE
HBA1C MFR BLD: 6.8 % (ref 4.8–5.6)
HCT VFR BLD AUTO: 35.8 % (ref 37.5–51)
HDLC SERPL-MCNC: 31 MG/DL
HGB BLD-MCNC: 11.6 G/DL (ref 13–17.7)
HGB UR QL STRIP: NEGATIVE
INTERPRETATION: NORMAL
KETONES UR QL STRIP: NEGATIVE
LDLC SERPL CALC-MCNC: 43 MG/DL (ref 0–99)
LEUKOCYTE ESTERASE UR QL STRIP: NEGATIVE
Lab: NORMAL
MCH RBC QN AUTO: 29.7 PG (ref 26.6–33)
MCHC RBC AUTO-ENTMCNC: 32.4 G/DL (ref 31.5–35.7)
MCV RBC AUTO: 92 FL (ref 79–97)
MICRO URNS: NORMAL
MICROALBUMIN UR-MCNC: 33.2 UG/ML
NITRITE UR QL STRIP: NEGATIVE
PH UR STRIP: 5.5 [PH] (ref 5–7.5)
PLATELET # BLD AUTO: 216 X10E3/UL (ref 150–450)
POTASSIUM SERPL-SCNC: 4.4 MMOL/L (ref 3.5–5.2)
PROT SERPL-MCNC: 7.1 G/DL (ref 6–8.5)
PROT UR QL STRIP: NEGATIVE
PSA SERPL-MCNC: 1.6 NG/ML (ref 0–4)
RBC # BLD AUTO: 3.91 X10E6/UL (ref 4.14–5.8)
SODIUM SERPL-SCNC: 138 MMOL/L (ref 134–144)
SP GR UR: 1.02 (ref 1–1.03)
TRIGL SERPL-MCNC: 133 MG/DL (ref 0–149)
TSH SERPL DL<=0.005 MIU/L-ACNC: 1.29 UIU/ML (ref 0.45–4.5)
UROBILINOGEN UR STRIP-MCNC: 0.2 MG/DL (ref 0.2–1)
VLDLC SERPL CALC-MCNC: 24 MG/DL (ref 5–40)
WBC # BLD AUTO: 11 X10E3/UL (ref 3.4–10.8)

## 2021-07-02 ENCOUNTER — APPOINTMENT (OUTPATIENT)
Dept: CT IMAGING | Age: 86
End: 2021-07-02
Attending: EMERGENCY MEDICINE
Payer: MEDICARE

## 2021-07-02 ENCOUNTER — HOSPITAL ENCOUNTER (EMERGENCY)
Age: 86
Discharge: HOME | End: 2021-07-02
Attending: EMERGENCY MEDICINE
Payer: MEDICARE

## 2021-07-02 ENCOUNTER — APPOINTMENT (OUTPATIENT)
Dept: GENERAL RADIOLOGY | Age: 86
End: 2021-07-02
Attending: EMERGENCY MEDICINE
Payer: MEDICARE

## 2021-07-02 VITALS
TEMPERATURE: 97.9 F | DIASTOLIC BLOOD PRESSURE: 50 MMHG | HEART RATE: 63 BPM | WEIGHT: 180 LBS | OXYGEN SATURATION: 93 % | HEIGHT: 71 IN | BODY MASS INDEX: 25.2 KG/M2 | RESPIRATION RATE: 14 BRPM | SYSTOLIC BLOOD PRESSURE: 117 MMHG

## 2021-07-02 DIAGNOSIS — R41.0 CONFUSION: Primary | ICD-10-CM

## 2021-07-02 DIAGNOSIS — E16.2 HYPOGLYCEMIA: ICD-10-CM

## 2021-07-02 LAB
ALBUMIN SERPL-MCNC: 3.8 G/DL (ref 3.5–5)
ALBUMIN/GLOB SERPL: 0.9 {RATIO} (ref 1.1–2.2)
ALP SERPL-CCNC: 105 U/L (ref 45–117)
ALT SERPL-CCNC: 21 U/L (ref 12–78)
ANION GAP SERPL CALC-SCNC: 9 MMOL/L (ref 5–15)
APPEARANCE UR: CLEAR
AST SERPL-CCNC: 20 U/L (ref 15–37)
BACTERIA URNS QL MICRO: NEGATIVE /HPF
BASOPHILS # BLD: 0 K/UL (ref 0–0.1)
BASOPHILS NFR BLD: 0 % (ref 0–1)
BILIRUB SERPL-MCNC: 0.8 MG/DL (ref 0.2–1)
BILIRUB UR QL: NEGATIVE
BNP SERPL-MCNC: 516 PG/ML
BUN SERPL-MCNC: 41 MG/DL (ref 6–20)
BUN/CREAT SERPL: 20 (ref 12–20)
CALCIUM SERPL-MCNC: 9.4 MG/DL (ref 8.5–10.1)
CHLORIDE SERPL-SCNC: 99 MMOL/L (ref 97–108)
CO2 SERPL-SCNC: 24 MMOL/L (ref 21–32)
COLOR UR: ABNORMAL
COMMENT, HOLDF: NORMAL
CREAT SERPL-MCNC: 2.05 MG/DL (ref 0.7–1.3)
DIFFERENTIAL METHOD BLD: ABNORMAL
EOSINOPHIL # BLD: 0.2 K/UL (ref 0–0.4)
EOSINOPHIL NFR BLD: 2 % (ref 0–7)
EPITH CASTS URNS QL MICRO: ABNORMAL /LPF
ERYTHROCYTE [DISTWIDTH] IN BLOOD BY AUTOMATED COUNT: 14.4 % (ref 11.5–14.5)
GLOBULIN SER CALC-MCNC: 4.1 G/DL (ref 2–4)
GLUCOSE BLD STRIP.AUTO-MCNC: 127 MG/DL (ref 65–117)
GLUCOSE SERPL-MCNC: 146 MG/DL (ref 65–100)
GLUCOSE UR STRIP.AUTO-MCNC: NEGATIVE MG/DL
HCT VFR BLD AUTO: 40.1 % (ref 36.6–50.3)
HGB BLD-MCNC: 13.2 G/DL (ref 12.1–17)
HGB UR QL STRIP: NEGATIVE
HYALINE CASTS URNS QL MICRO: ABNORMAL /LPF (ref 0–5)
IMM GRANULOCYTES # BLD AUTO: 0.1 K/UL (ref 0–0.04)
IMM GRANULOCYTES NFR BLD AUTO: 1 % (ref 0–0.5)
KETONES UR QL STRIP.AUTO: NEGATIVE MG/DL
LEUKOCYTE ESTERASE UR QL STRIP.AUTO: ABNORMAL
LYMPHOCYTES # BLD: 1 K/UL (ref 0.8–3.5)
LYMPHOCYTES NFR BLD: 10 % (ref 12–49)
MCH RBC QN AUTO: 29.6 PG (ref 26–34)
MCHC RBC AUTO-ENTMCNC: 32.9 G/DL (ref 30–36.5)
MCV RBC AUTO: 89.9 FL (ref 80–99)
MONOCYTES # BLD: 1 K/UL (ref 0–1)
MONOCYTES NFR BLD: 10 % (ref 5–13)
NEUTS SEG # BLD: 7.9 K/UL (ref 1.8–8)
NEUTS SEG NFR BLD: 77 % (ref 32–75)
NITRITE UR QL STRIP.AUTO: NEGATIVE
NRBC # BLD: 0 K/UL (ref 0–0.01)
NRBC BLD-RTO: 0 PER 100 WBC
PH UR STRIP: 5 [PH] (ref 5–8)
PLATELET # BLD AUTO: 231 K/UL (ref 150–400)
PMV BLD AUTO: 10.2 FL (ref 8.9–12.9)
POTASSIUM SERPL-SCNC: 3.9 MMOL/L (ref 3.5–5.1)
PROT SERPL-MCNC: 7.9 G/DL (ref 6.4–8.2)
PROT UR STRIP-MCNC: NEGATIVE MG/DL
RBC # BLD AUTO: 4.46 M/UL (ref 4.1–5.7)
RBC #/AREA URNS HPF: ABNORMAL /HPF (ref 0–5)
SAMPLES BEING HELD,HOLD: NORMAL
SERVICE CMNT-IMP: ABNORMAL
SODIUM SERPL-SCNC: 132 MMOL/L (ref 136–145)
SP GR UR REFRACTOMETRY: 1.01 (ref 1–1.03)
TROPONIN I SERPL-MCNC: <0.05 NG/ML
UA: UC IF INDICATED,UAUC: ABNORMAL
UROBILINOGEN UR QL STRIP.AUTO: 0.2 EU/DL (ref 0.2–1)
WBC # BLD AUTO: 10.2 K/UL (ref 4.1–11.1)
WBC URNS QL MICRO: ABNORMAL /HPF (ref 0–4)

## 2021-07-02 PROCEDURE — 36415 COLL VENOUS BLD VENIPUNCTURE: CPT

## 2021-07-02 PROCEDURE — 96361 HYDRATE IV INFUSION ADD-ON: CPT

## 2021-07-02 PROCEDURE — 96360 HYDRATION IV INFUSION INIT: CPT

## 2021-07-02 PROCEDURE — 81001 URINALYSIS AUTO W/SCOPE: CPT

## 2021-07-02 PROCEDURE — 99285 EMERGENCY DEPT VISIT HI MDM: CPT

## 2021-07-02 PROCEDURE — 93005 ELECTROCARDIOGRAM TRACING: CPT

## 2021-07-02 PROCEDURE — 84484 ASSAY OF TROPONIN QUANT: CPT

## 2021-07-02 PROCEDURE — 83880 ASSAY OF NATRIURETIC PEPTIDE: CPT

## 2021-07-02 PROCEDURE — 70450 CT HEAD/BRAIN W/O DYE: CPT

## 2021-07-02 PROCEDURE — 85025 COMPLETE CBC W/AUTO DIFF WBC: CPT

## 2021-07-02 PROCEDURE — 82962 GLUCOSE BLOOD TEST: CPT

## 2021-07-02 PROCEDURE — 74011250636 HC RX REV CODE- 250/636: Performed by: EMERGENCY MEDICINE

## 2021-07-02 PROCEDURE — 71045 X-RAY EXAM CHEST 1 VIEW: CPT

## 2021-07-02 PROCEDURE — 80053 COMPREHEN METABOLIC PANEL: CPT

## 2021-07-02 RX ADMIN — SODIUM CHLORIDE 1000 ML: 9 INJECTION, SOLUTION INTRAVENOUS at 19:56

## 2021-07-03 LAB
ATRIAL RATE: 68 BPM
CALCULATED P AXIS, ECG09: 33 DEGREES
CALCULATED R AXIS, ECG10: -105 DEGREES
CALCULATED T AXIS, ECG11: 68 DEGREES
DIAGNOSIS, 93000: NORMAL
P-R INTERVAL, ECG05: 344 MS
Q-T INTERVAL, ECG07: 474 MS
QRS DURATION, ECG06: 160 MS
QTC CALCULATION (BEZET), ECG08: 504 MS
VENTRICULAR RATE, ECG03: 68 BPM

## 2021-07-03 NOTE — ED PROVIDER NOTES
EMERGENCY DEPARTMENT HISTORY AND PHYSICAL EXAM      Date: 7/2/2021  Patient Name: Tin Banks    History of Presenting Illness     Chief Complaint   Patient presents with    Altered mental status     ED visit d/t AMS - onset of sxs, weeks ago yet worsened this afternoon - hx of DM, BG 57 medicated with D10 by EMS, repeat ;;       History Provided By: Patient    HPI: Tin Banks, 80 y.o. male  With past medical history of CAD, diabetes, hypertension presenting today with intermittent episodes of confusion. His daughter is with him and says that for the past 2 weeks he seems like he is not himself. He has been sleeping more frequently, and seems a lot more tired. She says that occasionally he will have periods of extreme confusion. He had worsened abnormal mental status earlier today. She says that he sounded like he had slurred speech, but this is now resolved. When paramedics picked him up he was hypoglycemic with a blood glucose of 57. He was given D10 and his repeat blood glucose was 237. The patient himself says that he has not been having any change in his symptoms. He says no fevers, chills, no nausea, no chest pain or shortness of breath. He denies any abdominal pain or dysuria. The patient says that he does have shortness of breath when he walks to the mailbox which is atypical for him a year. He does have history of CAD and heart failure. There are no other complaints, changes, or physical findings at this time. PCP: Pawel Ventura MD    No current facility-administered medications on file prior to encounter. Current Outpatient Medications on File Prior to Encounter   Medication Sig Dispense Refill    chlorthalidone (HYGROTON) 25 mg tablet Take 0.5 Tabs by mouth daily. 45 Tab 1    pioglitazone (ACTOS) 15 mg tablet Take 1 Tab by mouth daily. 90 Tab 1    amLODIPine-benazepril (LOTREL) 10-40 mg per capsule Take 1 Cap by mouth daily.  90 Cap 1    atorvastatin (Lipitor) 40 mg tablet Take 1 Tab by mouth daily. 90 Tab 1    cholecalciferol (VITAMIN D3) (5000 Units /125 mcg) capsule Take 1 Cap by mouth daily. 90 Cap 1    clopidogreL (PLAVIX) 75 mg tab Take 1 Tab by mouth daily. 90 Tab 1    donepeziL (ARICEPT) 5 mg tablet Take 1 Tab by mouth nightly. 90 Tab 1    glyBURIDE (DIABETA) 5 mg tablet Take 2 Tabs by mouth two (2) times daily (with meals). 360 Tab 1    levothyroxine (SYNTHROID) 100 mcg tablet Take 1 Tab by mouth Daily (before breakfast). 90 Tab 1    metoprolol succinate (TOPROL-XL) 50 mg XL tablet Take 1 Tab by mouth daily. 90 Tab 1    aspirin delayed-release 81 mg tablet Take 1 Tab by mouth daily.  80 Tab 1       Past History     Past Medical History:  Past Medical History:   Diagnosis Date    CAD (coronary artery disease)     CKD (chronic kidney disease) 2019    Diabetes ([address]     Essential hypertension     Hypercholesterolemia     S/P cardiac cath 2019    S/P coronary artery stent placement 2019 PCI/JOSE to distal LMT, ,prox LAD and LCX, distal RCA    Thyroid disease        Past Surgical History:  Past Surgical History:   Procedure Laterality Date    HX PACEMAKER         Family History:  Family History   Problem Relation Age of Onset    Diabetes Mother     Heart Surgery Father     No Known Problems Sister     No Known Problems Brother     No Known Problems Sister     No Known Problems Sister        Social History:  Social History     Tobacco Use    Smoking status: Former Smoker     Quit date: 1979     Years since quittin.8    Smokeless tobacco: Never Used   Substance Use Topics    Alcohol use: Not Currently    Drug use: Never       Allergies:  No Known Allergies      Review of Systems   Constitutional: No  fever  Skin: No  rash  HEENT: No  nasal congestion  Resp: No cough  CV: No chest pain  GI: No vomiting  : No dysuria  MSK: No joint pain  Neuro: No numbness  Psych: No anxiety      Physical Exam     Patient Vitals for the past 12 hrs:   Temp Pulse Resp BP SpO2   07/02/21 2000 -- 63 14 (!) 117/50 93 %   07/02/21 1945 -- 62 16 122/62 95 %   07/02/21 1930 -- 67 18 (!) 128/56 93 %   07/02/21 1756 97.9 °F (36.6 °C) 79 18 137/62 98 %     General: alert, No acute distress  Eyes: EOMI, normal conjunctiva  ENT: moist mucous membranes. Neck: Active, full ROM of neck. Skin: No rashes. no jaundice              Lungs: Equal chest expansion. no respiratory distress. clear to auscultation bilaterally No accessory muscle usage  Heart: regular rate     no peripheral edema   2+ radial pulses and DPs bilaterally  Abd:  non distended soft, nontender. No rebound tenderness. No guarding  Back: Full ROM  MSK: Full, active ROM in all 4 extremities. Neuro: Alert and oriented to Person, Place, Time, and Situation; normal speech;   Psych: Cooperative with exam; Appropriate mood and affect             Diagnostic Study Results     Labs -     Recent Results (from the past 12 hour(s))   CBC WITH AUTOMATED DIFF    Collection Time: 07/02/21  6:48 PM   Result Value Ref Range    WBC 10.2 4.1 - 11.1 K/uL    RBC 4.46 4.10 - 5.70 M/uL    HGB 13.2 12.1 - 17.0 g/dL    HCT 40.1 36.6 - 50.3 %    MCV 89.9 80.0 - 99.0 FL    MCH 29.6 26.0 - 34.0 PG    MCHC 32.9 30.0 - 36.5 g/dL    RDW 14.4 11.5 - 14.5 %    PLATELET 468 129 - 102 K/uL    MPV 10.2 8.9 - 12.9 FL    NRBC 0.0 0  WBC    ABSOLUTE NRBC 0.00 0.00 - 0.01 K/uL    NEUTROPHILS 77 (H) 32 - 75 %    LYMPHOCYTES 10 (L) 12 - 49 %    MONOCYTES 10 5 - 13 %    EOSINOPHILS 2 0 - 7 %    BASOPHILS 0 0 - 1 %    IMMATURE GRANULOCYTES 1 (H) 0.0 - 0.5 %    ABS. NEUTROPHILS 7.9 1.8 - 8.0 K/UL    ABS. LYMPHOCYTES 1.0 0.8 - 3.5 K/UL    ABS. MONOCYTES 1.0 0.0 - 1.0 K/UL    ABS. EOSINOPHILS 0.2 0.0 - 0.4 K/UL    ABS. BASOPHILS 0.0 0.0 - 0.1 K/UL    ABS. IMM.  GRANS. 0.1 (H) 0.00 - 0.04 K/UL    DF AUTOMATED     METABOLIC PANEL, COMPREHENSIVE    Collection Time: 07/02/21  6:48 PM   Result Value Ref Range    Sodium 132 (L) 136 - 145 mmol/L    Potassium 3.9 3.5 - 5.1 mmol/L    Chloride 99 97 - 108 mmol/L    CO2 24 21 - 32 mmol/L    Anion gap 9 5 - 15 mmol/L    Glucose 146 (H) 65 - 100 mg/dL    BUN 41 (H) 6 - 20 MG/DL    Creatinine 2.05 (H) 0.70 - 1.30 MG/DL    BUN/Creatinine ratio 20 12 - 20      GFR est AA 37 (L) >60 ml/min/1.73m2    GFR est non-AA 31 (L) >60 ml/min/1.73m2    Calcium 9.4 8.5 - 10.1 MG/DL    Bilirubin, total 0.8 0.2 - 1.0 MG/DL    ALT (SGPT) 21 12 - 78 U/L    AST (SGOT) 20 15 - 37 U/L    Alk. phosphatase 105 45 - 117 U/L    Protein, total 7.9 6.4 - 8.2 g/dL    Albumin 3.8 3.5 - 5.0 g/dL    Globulin 4.1 (H) 2.0 - 4.0 g/dL    A-G Ratio 0.9 (L) 1.1 - 2.2     SAMPLES BEING HELD    Collection Time: 07/02/21  6:48 PM   Result Value Ref Range    SAMPLES BEING HELD  PST     COMMENT        Add-on orders for these samples will be processed based on acceptable specimen integrity and analyte stability, which may vary by analyte. TROPONIN I    Collection Time: 07/02/21  6:48 PM   Result Value Ref Range    Troponin-I, Qt. <0.05 <0.05 ng/mL   NT-PRO BNP    Collection Time: 07/02/21  6:48 PM   Result Value Ref Range    NT pro- (H) <450 PG/ML   GLUCOSE, POC    Collection Time: 07/02/21  7:21 PM   Result Value Ref Range    Glucose (POC) 127 (H) 65 - 117 mg/dL    Performed by Angelika Barrett RN    EKG, 12 LEAD, INITIAL    Collection Time: 07/02/21  8:21 PM   Result Value Ref Range    Ventricular Rate 68 BPM    Atrial Rate 68 BPM    P-R Interval 344 ms    QRS Duration 160 ms    Q-T Interval 474 ms    QTC Calculation (Bezet) 504 ms    Calculated P Axis 33 degrees    Calculated R Axis -105 degrees    Calculated T Axis 68 degrees    Diagnosis       Atrial-sensed ventricular-paced rhythm with prolonged AV conduction  When compared with ECG of 24-SEP-2019 16:04,  Vent.  rate has decreased BY   9 BPM     URINALYSIS W/ REFLEX CULTURE    Collection Time: 07/02/21 10:09 PM    Specimen: Urine   Result Value Ref Range    Color YELLOW/STRAW      Appearance CLEAR CLEAR      Specific gravity 1.014 1.003 - 1.030      pH (UA) 5.0 5.0 - 8.0      Protein Negative NEG mg/dL    Glucose Negative NEG mg/dL    Ketone Negative NEG mg/dL    Bilirubin Negative NEG      Blood Negative NEG      Urobilinogen 0.2 0.2 - 1.0 EU/dL    Nitrites Negative NEG      Leukocyte Esterase TRACE (A) NEG      WBC 0-4 0 - 4 /hpf    RBC 0-5 0 - 5 /hpf    Epithelial cells FEW FEW /lpf    Bacteria Negative NEG /hpf    UA:UC IF INDICATED CULTURE NOT INDICATED BY UA RESULT CNI      Hyaline cast 0-2 0 - 5 /lpf       Radiologic Studies -   CT HEAD WO CONT   Final Result   1. No evidence of acute intracranial abnormality. 2. Mild generalized parenchymal volume loss and moderate to severe chronic   microvascular ischemic disease. 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 XR CHEST PORT   Final Result      No acute process on portable chest.           CT Results  (Last 48 hours)                 07/02/21 2122  CT HEAD WO CONT Final result    Impression:  1. No evidence of acute intracranial abnormality. 2. Mild generalized parenchymal volume loss and moderate to severe chronic   microvascular ischemic disease. 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 Narrative:  EXAM:  CT HEAD WO CONT       INDICATION:   AMS       COMPARISON: None. TECHNIQUE: Unenhanced CT of the head was performed using 5 mm images. Brain and   bone windows were generated. CT dose reduction was achieved through use of a   standardized protocol tailored for this examination and automatic exposure   control for dose modulation. FINDINGS:   Mild generalized parenchymal volume loss with commensurate dilation of the sulci   and ventricular system. Confluent periventricular and deep white matter   hypodensities, consistent with moderate to severe chronic microangiopathic   ischemic changes. 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 Basilar   cisterns are patent. No midline shift.  There is no evidence of acute infarct, hemorrhage, or extraaxial fluid collection. The paranasal sinuses, mastoid air cells, and middle ears are clear. The orbital   contents are within normal limits with bilateral lens implants. There are no   significant osseous or extracranial soft tissue lesions. CXR Results  (Last 48 hours)                 07/02/21 2020  XR CHEST PORT Final result    Impression:      No acute process on portable chest.           Narrative:  EXAM: XR CHEST PORT       INDICATION: Confusion today. Diabetes and hypertension. COMPARISON: None       TECHNIQUE: Semiupright portable chest AP view       FINDINGS: Cardiac monitoring wires overlie the thorax. Cardiac pacemaker battery   pack and leads. The cardiomediastinal and hilar contours are within normal   limits. The pulmonary vasculature is within normal limits. The lungs and pleural spaces are clear. Bones are osteopenic. Medical Decision Making   I am the first provider for this patient. I reviewed the vital signs, available nursing notes, past medical history, past surgical history, family history and social history. Vital Signs-Reviewed the patient's vital signs. Patient Vitals for the past 12 hrs:   Temp Pulse Resp BP SpO2   07/02/21 2000 -- 63 14 (!) 117/50 93 %   07/02/21 1945 -- 62 16 122/62 95 %   07/02/21 1930 -- 67 18 (!) 128/56 93 %   07/02/21 1756 97.9 °F (36.6 °C) 79 18 137/62 98 %       Pulse Oximetry Analysis - 98% on room air  -  Interpretation: Normal      Provider Notes (Medical Decision Making):     Differential Diagnosis: Urinary tract infection, pneumonia, pneumothorax, ACS, heart failure, electrolyte abnormality, dementia, stroke    Initial Plan: Will obtain EKG, chest x-ray, laboratory studies, CT of the head. The patient is in no acute distress with normal vital signs currently.   He did arrive hyperglycemic, he is on pioglitazone, and also takes glyburide, concern for possible intermittent episodes of hypoglycemia. Will reassess after work-up and treatment. ED Course:   Initial assessment performed. The patients presenting problems have been discussed, and they are in agreement with the care plan formulated and outlined with them. I have encouraged them to ask questions as they arise throughout their visit. ED Course as of Jul 03 0210 Fri Jul 02, 2021 2032 On my interpretation of the patient's EKG atrial sensed and V paced rhythm at a rate of 68, QTc is 404, no significantly discordant ST elevation. [NW]   4033 On my interpretation of the patient's laboratory studies urinalysis sample is normal, metabolic panel is largely unremarkable revealing elevated BUN, but creatinine at baseline, CBC unremarkable. [NW]   0982 On my interpretation of the patient's CT head imaging there is evidence of chronic ischemic changes, but no acute abnormality. [NW]   5623 On my interpretation of the patient's chest x-ray there is no evidence of acute abnormality. [NW]   1454 Patient presenting today with intermittent episodes of confusion. He was hypoglycemic upon EMS arrival.  Today he is alert laboratory work-up has been unremarkable, EKG is nonischemic, CT head without acute abnormality. We discussed discontinuing the patient's glyburide and having them follow-up on his blood sugars over the next several days. Patient ultimately was discharged with return precautions. [NW]      ED Course User Index  [NW] Jennifer Curiel MD       I, Batsheva Elder MD, am the attending of record for this patient encounter. Dispo: Discharged. The patient has been re-evaluated and is ready for discharge. Reviewed available results with patient. Counseled patient on diagnosis and care plan. Patient has expressed understanding, and all questions have been answered. Patient agrees with plan and agrees to follow up as recommended, or to return to the ED if their symptoms worsen.  Discharge instructions have been provided and explained to the patient, along with reasons to return to the ED. PLAN:  Discharge Medication List as of 7/2/2021 10:49 PM        2. Follow-up Information       Follow up With Specialties Details Why Contact Info    Ehsan Falcon MD Internal Medicine   [id number]  [phone]            3. Return to ED if worse       Diagnosis     Clinical Impression:   1. Confusion    2. Hypoglycemia        Attestations:    Aramis Roldan MD    Please note that this dictation was completed with [employer], the computer voice recognition software. Quite often unanticipated grammatical, syntax, homophones, and other interpretive errors are inadvertently transcribed by the computer software. Please disregard these errors. Please excuse any errors that have escaped final proofreading. Thank you.

## 2021-07-03 NOTE — DISCHARGE INSTRUCTIONS
Hold the glyburide due to the low blood sugar. Keep a log of the blood sugars over the next week, then follow-up with the patient's primary care provider.

## 2021-07-03 NOTE — ED NOTES
2253 - Patient discharge by Judah Kumari MD - pt sent to the front lobby, with strong and steady gait -  Discharge information / home RX / and reasons to return to the ED were reviewed by the doctor.       Family members at bedside for comfort, care and for ride home;;

## 2021-07-06 ENCOUNTER — PATIENT OUTREACH (OUTPATIENT)
Dept: CASE MANAGEMENT | Age: 86
End: 2021-07-06

## 2021-07-08 NOTE — PROGRESS NOTES
Ambulatory Care Management Note    Date/Time:  7/8/2021 1:12 PM    This patient was received as a referral from Daily assignment. Ambulatory Care Manager outreached to patient today to offer care management services. Introduction to self and role of care manager provided. Patient accepted care management services at this time. Follow up call scheduled at this time. Patient has Ambulatory Care Manager's contact number for for any questions or concerns.

## 2021-07-09 ENCOUNTER — VIRTUAL VISIT (OUTPATIENT)
Dept: FAMILY MEDICINE CLINIC | Age: 86
End: 2021-07-09
Payer: MEDICARE

## 2021-07-09 DIAGNOSIS — E11.21 TYPE 2 DIABETES WITH NEPHROPATHY (HCC): Primary | ICD-10-CM

## 2021-07-09 DIAGNOSIS — F03.90 DEMENTIA WITHOUT BEHAVIORAL DISTURBANCE, UNSPECIFIED DEMENTIA TYPE: ICD-10-CM

## 2021-07-09 PROCEDURE — G0463 HOSPITAL OUTPT CLINIC VISIT: HCPCS | Performed by: INTERNAL MEDICINE

## 2021-07-09 PROCEDURE — G8536 NO DOC ELDER MAL SCRN: HCPCS | Performed by: INTERNAL MEDICINE

## 2021-07-09 PROCEDURE — G8427 DOCREV CUR MEDS BY ELIG CLIN: HCPCS | Performed by: INTERNAL MEDICINE

## 2021-07-09 PROCEDURE — 1101F PT FALLS ASSESS-DOCD LE1/YR: CPT | Performed by: INTERNAL MEDICINE

## 2021-07-09 PROCEDURE — G8938 BMI DOC ONL FUP NT DOC: HCPCS | Performed by: INTERNAL MEDICINE

## 2021-07-09 PROCEDURE — 99213 OFFICE O/P EST LOW 20 MIN: CPT | Performed by: INTERNAL MEDICINE

## 2021-07-09 PROCEDURE — G8432 DEP SCR NOT DOC, RNG: HCPCS | Performed by: INTERNAL MEDICINE

## 2021-07-09 NOTE — PROGRESS NOTES
Chief Complaint   Patient presents with    Follow-up     ER visit 7/2/21     HPI:  Bhakti Elder is a 80 y.o. male who was seen by synchronous (real-time) audio-video technology on 7/9/2021 for Follow-up (ER visit 7/2/21)    Assessment & Plan:   Diagnoses and all orders for this visit:    1. Type 2 diabetes with nephropathy ([facility] [address]    2. Dementia without behavioral disturbance, unspecified dementia type ([facility] [address]      I spent at least 20 minutes on this visit with this established patient. 712  Subjective: Bhakti Elder is a 80 y.o.   male with h/o hypertension, hypercholesterolemia, diabetes type 2, CKD, CAD, s/p cardiac cath, bilateral carotid artery stenosis is seen accompanied by daughter for ER follow up. Patient was seen in [id number] [facility] ER for hypoglycemia. Daughter was advised to hold off glyburide until she sees pcp. Home BS reading today is 125 mg/dl, LB=034/67, p=78. Advised to stay off glyburide and f/u in. Prior to Admission medications    Medication Sig Start Date End Date Taking? Authorizing Provider   chlorthalidone (HYGROTON) 25 mg tablet Take 0.5 Tabs by mouth daily. 10/6/20  Yes Jacqueline Bautista MD   pioglitazone (ACTOS) 15 mg tablet Take 1 Tab by mouth daily. 10/6/20  Yes Jacqueline Bautista MD   amLODIPine-benazepril (LOTREL) 10-40 mg per capsule Take 1 Cap by mouth daily. 10/6/20  Yes Jacqueline Bautista MD   atorvastatin (Lipitor) 40 mg tablet Take 1 Tab by mouth daily. 10/6/20  Yes Jacqueline Bautista MD   cholecalciferol (VITAMIN D3) (5000 Units /125 mcg) capsule Take 1 Cap by mouth daily. 10/6/20  Yes Jacqueline Bautista MD   clopidogreL (PLAVIX) 75 mg tab Take 1 Tab by mouth daily. 10/6/20  Yes Jacqueline Bautista MD   donepeziL (ARICEPT) 5 mg tablet Take 1 Tab by mouth nightly. 10/6/20  Yes Jacqueline Bautista MD   levothyroxine (SYNTHROID) 100 mcg tablet Take 1 Tab by mouth Daily (before breakfast). 10/6/20  Yes Jacqueline Bautista MD   metoprolol succinate (TOPROL-XL) 50 mg XL tablet Take 1 Tab by mouth daily.  10/6/20 Yes Conchita Arora MD   aspirin delayed-release 81 mg tablet Take 1 Tab by mouth daily. 10/6/20  Yes Conchita Arora MD   glyBURIDE (DIABETA) 5 mg tablet Take 2 Tabs by mouth two (2) times daily (with meals). 10/6/20 7/9/21  Conchita Arora MD     Patient Active Problem List   Diagnosis Code    Hypertension I10    Coronary artery disease involving native coronary artery of native heart I25.10    Controlled type 2 diabetes mellitus, without long-term current use of insulin ([facility]) E11.9    Pacemaker Z95.0    Elevated blood pressure affecting pregnancy in first trimester, antepartum O16.1    Angina, class III ([facility]) I20.9    Bilateral carotid artery stenosis I65.23    CKD (chronic kidney disease) N18.9    S/P cardiac cath Z98.890    S/P coronary artery stent placement Z95.5    Type 2 diabetes with nephropathy ([facility]) E11.21    Type 2 diabetes mellitus with peripheral vascular disease ([facility]) E11.51    CKD (chronic kidney disease) stage 3, GFR 30-59 ml/min ([facility]) N18.30     Current Outpatient Medications   Medication Sig Dispense Refill    chlorthalidone (HYGROTON) 25 mg tablet Take 0.5 Tabs by mouth daily. 45 Tab 1    pioglitazone (ACTOS) 15 mg tablet Take 1 Tab by mouth daily. 90 Tab 1    amLODIPine-benazepril (LOTREL) 10-40 mg per capsule Take 1 Cap by mouth daily. 90 Cap 1    atorvastatin (Lipitor) 40 mg tablet Take 1 Tab by mouth daily. 90 Tab 1    cholecalciferol (VITAMIN D3) (5000 Units /125 mcg) capsule Take 1 Cap by mouth daily. 90 Cap 1    clopidogreL (PLAVIX) 75 mg tab Take 1 Tab by mouth daily. 90 Tab 1    donepeziL (ARICEPT) 5 mg tablet Take 1 Tab by mouth nightly. 90 Tab 1    levothyroxine (SYNTHROID) 100 mcg tablet Take 1 Tab by mouth Daily (before breakfast). 90 Tab 1    metoprolol succinate (TOPROL-XL) 50 mg XL tablet Take 1 Tab by mouth daily. 90 Tab 1    aspirin delayed-release 81 mg tablet Take 1 Tab by mouth daily.  90 Tab 1     No Known Allergies  Past Medical History:   Diagnosis Date  CAD (coronary artery disease)     CKD (chronic kidney disease) 2019    Diabetes ([facility] [address]     Essential hypertension     Hypercholesterolemia     S/P cardiac cath 2019    S/P coronary artery stent placement 2019 PCI/JOSE to distal LMT, ,prox LAD and LCX, distal RCA    Thyroid disease      Past Surgical History:   Procedure Laterality Date    HX PACEMAKER       Family History   Problem Relation Age of Onset    Diabetes Mother     Heart Surgery Father     No Known Problems Sister     No Known Problems Brother     No Known Problems Sister     No Known Problems Sister      Social History     Tobacco Use    Smoking status: Former Smoker     Quit date: 1979     Years since quittin.8    Smokeless tobacco: Never Used   Substance Use Topics    Alcohol use: Not Currently     ROS:  As per hpi    Objective:   No flowsheet data found. General: alert, cooperative, no distress   Mental  status: normal mood, behavior, speech, dress, motor activity, and thought processes, able to follow commands   HENT: NCAT   Neck: no visualized mass   Resp: no respiratory distress   Neuro: no gross deficits   Skin: no discoloration or lesions of concern on visible areas   Psychiatric: normal affect, consistent with stated mood, no evidence of hallucinations     Additional exam findings: We discussed the expected course, resolution and complications of the diagnosis(es) in detail. Medication risks, benefits, costs, interactions, and alternatives were discussed as indicated. I advised him to contact the office if his condition worsens, changes or fails to improve as anticipated. He expressed understanding with the diagnosis(es) and plan. Rajan Keating, was evaluated through a synchronous (real-time) audio-video encounter. The patient (or guardian if applicable) is aware that this is a billable service. Verbal consent to proceed has been obtained within the past 12 months.  The visit was conducted pursuant to the emergency declaration under the [facility] [address] authority and the [email or web address] and [employer] General Act. Patient identification was verified, and a caregiver was present when appropriate. The patient was located in a state where the provider was credentialed to provide care.     Mariam Sargent MD

## 2021-07-09 NOTE — PROGRESS NOTES
Chief Complaint   Patient presents with    Follow-up     ER visit 7/2/21       1. Have you been to the ER, urgent care clinic since your last visit? Hospitalized since your last visit? Yes When: 7/2/21 [facility] ER low blood sugar and blood pressure     2. Have you seen or consulted any other health care providers outside of the [address] [place] [address] since your last visit? Include any pap smears or colon screening. No     Pt was seen at [id number] [facility] ER on 7/2/21. His blood glucose level was 57 arriving at the ER and his BP was approx 120/50. Pts daughter says this has continued after ER visit, wants to discuss      ER doctor took patient off the Glyburide until speaking with his PCP.

## 2021-07-19 ENCOUNTER — PATIENT OUTREACH (OUTPATIENT)
Dept: CASE MANAGEMENT | Age: 86
End: 2021-07-19

## 2021-07-20 NOTE — PROGRESS NOTES
Ambulatory Care Management Note    Date/Time:  7/20/2021 1:08 PM    This Ambulatory Care Manager (ACM) reviewed and updated the following screenings during this call; general assessment, medication reconciliation. Patient's challenges to self management identified:   lack of knowledge about disease      Medication Management:  good adherence    Advance Care Planning:   Does patient have an Advance Directive:  not on file; will address during future outreach    [employer], Referrals, and Durable Medical Equipment: none      Health Maintenance Due   Topic Date Due    Eye Exam Retinal or Dilated  Never done    COVID-19 Vaccine (1) Never done    DTaP/Tdap/Td series (1 - Tdap) Never done    Shingrix Vaccine Age 50> (1 of 2) Never done    Pneumococcal 65+ years (1 of 1 - PPSV23) Never done    Foot Exam Q1  02/11/2021     Health Maintenance reviewed with patient's daughter     Patient's daughter was asked to consider health care goals that they would like to focus on with this ACM. ACM will follow up with patient to discuss goals and establish care plan.        PCP/Specialist follow up:   Future Appointments   Date Time Provider Jaden Rivera   7/27/2021  2:40 PM Yo Barney MD [facility] BS AMB

## 2021-07-23 NOTE — Clinical Note
Left detailed msg on 980# Lesion located in the Distal LM. Balloon inserted. Balloon inflated using multiple inflations inflation technique. Pressure = 18 sanjeev; Duration = 14 sec. Inflation 2: Pressure: 14 sanjeev; Duration: 8 sec.

## 2021-07-27 ENCOUNTER — OFFICE VISIT (OUTPATIENT)
Dept: FAMILY MEDICINE CLINIC | Age: 86
End: 2021-07-27
Payer: MEDICARE

## 2021-07-27 DIAGNOSIS — E78.00 HYPERCHOLESTEROLEMIA: ICD-10-CM

## 2021-07-27 DIAGNOSIS — F03.90 DEMENTIA WITHOUT BEHAVIORAL DISTURBANCE, UNSPECIFIED DEMENTIA TYPE: ICD-10-CM

## 2021-07-27 DIAGNOSIS — E11.21 TYPE 2 DIABETES WITH NEPHROPATHY (HCC): Primary | ICD-10-CM

## 2021-07-27 DIAGNOSIS — I49.5 SICK SINUS SYNDROME (HCC): ICD-10-CM

## 2021-07-27 DIAGNOSIS — Z95.0 PACEMAKER: ICD-10-CM

## 2021-07-27 DIAGNOSIS — I10 HYPERTENSION, WELL CONTROLLED: ICD-10-CM

## 2021-07-27 DIAGNOSIS — E11.51 TYPE 2 DIABETES MELLITUS WITH PERIPHERAL VASCULAR DISEASE (HCC): ICD-10-CM

## 2021-07-27 DIAGNOSIS — I20.9 ANGINA, CLASS III (HCC): ICD-10-CM

## 2021-07-27 DIAGNOSIS — E55.9 VITAMIN D DEFICIENCY: ICD-10-CM

## 2021-07-27 DIAGNOSIS — E03.9 ACQUIRED HYPOTHYROIDISM: ICD-10-CM

## 2021-07-27 DIAGNOSIS — Z95.5 S/P CORONARY ARTERY STENT PLACEMENT: ICD-10-CM

## 2021-07-27 DIAGNOSIS — E11.9 ENCOUNTER FOR DIABETIC FOOT EXAM (HCC): ICD-10-CM

## 2021-07-27 LAB
GLUCOSE POC: 307 MG/DL
HBA1C MFR BLD HPLC: 6.6 %

## 2021-07-27 PROCEDURE — 83036 HEMOGLOBIN GLYCOSYLATED A1C: CPT | Performed by: INTERNAL MEDICINE

## 2021-07-27 PROCEDURE — 99214 OFFICE O/P EST MOD 30 MIN: CPT | Performed by: INTERNAL MEDICINE

## 2021-07-27 PROCEDURE — G8420 CALC BMI NORM PARAMETERS: HCPCS | Performed by: INTERNAL MEDICINE

## 2021-07-27 PROCEDURE — G8536 NO DOC ELDER MAL SCRN: HCPCS | Performed by: INTERNAL MEDICINE

## 2021-07-27 PROCEDURE — 82962 GLUCOSE BLOOD TEST: CPT | Performed by: INTERNAL MEDICINE

## 2021-07-27 PROCEDURE — G8427 DOCREV CUR MEDS BY ELIG CLIN: HCPCS | Performed by: INTERNAL MEDICINE

## 2021-07-27 PROCEDURE — G0463 HOSPITAL OUTPT CLINIC VISIT: HCPCS | Performed by: INTERNAL MEDICINE

## 2021-07-27 PROCEDURE — 1101F PT FALLS ASSESS-DOCD LE1/YR: CPT | Performed by: INTERNAL MEDICINE

## 2021-07-27 PROCEDURE — G8510 SCR DEP NEG, NO PLAN REQD: HCPCS | Performed by: INTERNAL MEDICINE

## 2021-07-27 RX ORDER — METOPROLOL SUCCINATE 50 MG/1
50 TABLET, EXTENDED RELEASE ORAL DAILY
Qty: 90 TABLET | Refills: 1 | Status: SHIPPED | OUTPATIENT
Start: 2021-07-27 | End: 2021-08-05 | Stop reason: SDUPTHER

## 2021-07-27 RX ORDER — CLOPIDOGREL BISULFATE 75 MG/1
75 TABLET ORAL DAILY
Qty: 90 TABLET | Refills: 1 | Status: SHIPPED | OUTPATIENT
Start: 2021-07-27 | End: 2021-11-05 | Stop reason: SDUPTHER

## 2021-07-27 RX ORDER — CHLORTHALIDONE 25 MG/1
12.5 TABLET ORAL DAILY
Qty: 45 TABLET | Refills: 1 | Status: SHIPPED | OUTPATIENT
Start: 2021-07-27 | End: 2021-08-05 | Stop reason: SDUPTHER

## 2021-07-27 RX ORDER — CHOLECALCIFEROL (VITAMIN D3) 125 MCG
5000 CAPSULE ORAL DAILY
Qty: 90 CAPSULE | Refills: 1 | Status: SHIPPED | OUTPATIENT
Start: 2021-07-27 | End: 2021-11-05 | Stop reason: SDUPTHER

## 2021-07-27 RX ORDER — ASPIRIN 81 MG/1
81 TABLET ORAL DAILY
Qty: 90 TABLET | Refills: 1 | Status: SHIPPED | OUTPATIENT
Start: 2021-07-27

## 2021-07-27 RX ORDER — DONEPEZIL HYDROCHLORIDE 5 MG/1
5 TABLET, FILM COATED ORAL
Qty: 90 TABLET | Refills: 1 | Status: SHIPPED | OUTPATIENT
Start: 2021-07-27 | End: 2021-11-05 | Stop reason: SDUPTHER

## 2021-07-27 RX ORDER — LEVOTHYROXINE SODIUM 100 UG/1
100 TABLET ORAL
Qty: 90 TABLET | Refills: 1 | Status: SHIPPED | OUTPATIENT
Start: 2021-07-27 | End: 2021-11-05 | Stop reason: SDUPTHER

## 2021-07-27 RX ORDER — ATORVASTATIN CALCIUM 40 MG/1
40 TABLET, FILM COATED ORAL DAILY
Qty: 90 TABLET | Refills: 1 | Status: SHIPPED | OUTPATIENT
Start: 2021-07-27 | End: 2021-11-05 | Stop reason: SDUPTHER

## 2021-07-27 NOTE — PROGRESS NOTES
Chief Complaint   Patient presents with    Follow-up     2 week fu        1. Have you been to the ER, urgent care clinic since your last visit? Hospitalized since your last visit? No    2. Have you seen or consulted any other health care providers outside of the [address] [place] [address] since your last visit? Include any pap smears or colon screening.  No

## 2021-07-27 NOTE — PROGRESS NOTES
Chief Complaint   Patient presents with    Follow-up     2 week fu      HPI:  Loco Turner is a 80 y.o.  male with h/o hypertension, hypercholesterolemia, diabetes type 2, CKD, CAD, s/p cardiac cath, bilateral carotid artery stenosis presents accompanied by daughter for 2 week follow up. Patient was seen in the ER for low blood pressure and low blood sugar. Antidiabetic medications were stopped. BS in clinic today is 307mg/dl. Advise daughter to monitor BS. Also, advised to stop Lotrel but continue metoprolol and chlorthalidone, monitor BP and return clinic in 2 weeks    Review of Systems  As per hpi    Past Medical History:   Diagnosis Date    CAD (coronary artery disease)     CKD (chronic kidney disease) 2019    Diabetes ([facility] [address]     Essential hypertension     Hypercholesterolemia     S/P cardiac cath 2019    S/P coronary artery stent placement 2019 PCI/JOSE to distal LMT, ,prox LAD and LCX, distal RCA    Thyroid disease      Past Surgical History:   Procedure Laterality Date    HX PACEMAKER       Social History     Socioeconomic History    Marital status:      Spouse name: Not on file    Number of children: Not on file    Years of education: Not on file    Highest education level: Not on file   Tobacco Use    Smoking status: Former Smoker     Quit date: 1979     Years since quittin.9    Smokeless tobacco: Never Used   Vaping Use    Vaping Use: Never used   Substance and Sexual Activity    Alcohol use: Not Currently    Drug use: Never    Sexual activity: Not Currently     Social Determinants of Health     Financial Resource Strain:     Difficulty of Paying Living Expenses:    Food Insecurity:     Worried About Running Out of Food in the Last Year:     Ran Out of Food in the Last Year:    Transportation Needs:     Lack of Transportation (Medical):      Lack of Transportation (Non-Medical):    Physical Activity:     Days of Exercise per Week:     Minutes of Exercise per Session:    Stress:     Feeling of Stress :    Social Connections:     Frequency of Communication with Friends and Family:     Frequency of Social Gatherings with Friends and Family:     Attends Church Services:     Active Member of Clubs or Organizations:     Attends Club or Organization Meetings:     Marital Status:      Family History   Problem Relation Age of Onset    Diabetes Mother     Heart Surgery Father     No Known Problems Sister     No Known Problems Brother     No Known Problems Sister     No Known Problems Sister      Current Outpatient Medications   Medication Sig Dispense Refill    chlorthalidone (HYGROTON) 25 mg tablet Take 0.5 Tabs by mouth daily. 45 Tab 1    pioglitazone (ACTOS) 15 mg tablet Take 1 Tab by mouth daily. 90 Tab 1    amLODIPine-benazepril (LOTREL) 10-40 mg per capsule Take 1 Cap by mouth daily. 90 Cap 1    atorvastatin (Lipitor) 40 mg tablet Take 1 Tab by mouth daily. 90 Tab 1    cholecalciferol (VITAMIN D3) (5000 Units /125 mcg) capsule Take 1 Cap by mouth daily. 90 Cap 1    clopidogreL (PLAVIX) 75 mg tab Take 1 Tab by mouth daily. 90 Tab 1    donepeziL (ARICEPT) 5 mg tablet Take 1 Tab by mouth nightly. 90 Tab 1    levothyroxine (SYNTHROID) 100 mcg tablet Take 1 Tab by mouth Daily (before breakfast). 90 Tab 1    metoprolol succinate (TOPROL-XL) 50 mg XL tablet Take 1 Tab by mouth daily. 90 Tab 1    aspirin delayed-release 81 mg tablet Take 1 Tab by mouth daily.  90 Tab 1     No Known Allergies    Objective:  Visit Vitals  BP (!) 108/66 (BP 1 Location: Left upper arm, BP Patient Position: Sitting, BP Cuff Size: Adult)   Pulse 78   Temp (!) 95.2 °F (35.1 °C) (Oral)   Resp 16   Ht 5' 11\" (1.803 m)   Wt 179 lb (81.2 kg)   SpO2 97%   BMI 24.97 kg/m²     Physical Exam:   General appearance - alert, well appearing in no distress  Mental status - alert, oriented to person, place, and time  Chest - clear to auscultation, no wheezes, rales or rhonchi  Heart - normal rate, regular rhythm, no murmurs  Abdomen - soft, nontender, nondistended, no organomegaly  Ext-peripheral pulses normal, no pedal edema  Neuro - no focal findings   Back-full range of motion, no tenderness, palpable spasm or pain on motion     Results for orders placed or performed in visit on 07/27/21   AMB POC GLUCOSE BLOOD, BY GLUCOSE MONITORING DEVICE   Result Value Ref Range    Glucose  MG/DL   AMB POC HEMOGLOBIN A1C   Result Value Ref Range    Hemoglobin A1c (POC) 6.6 %     Assessment/Plan:  Diagnoses and all orders for this visit:    Type 2 diabetes with nephropathy (HCC)  -     AMB POC GLUCOSE BLOOD, BY GLUCOSE MONITORING DEVICE  -     AMB POC HEMOGLOBIN A1C  -     atorvastatin (Lipitor) 40 mg tablet; Take 1 Tablet by mouth daily. , Normal, Disp-90 Tablet, R-1  -     aspirin delayed-release 81 mg tablet; Take 1 Tablet by mouth daily. , Normal, Disp-90 Tablet, R-1    Type 2 diabetes mellitus with peripheral vascular disease (HCC)  -     atorvastatin (Lipitor) 40 mg tablet; Take 1 Tablet by mouth daily. , Normal, Disp-90 Tablet, R-1  -     aspirin delayed-release 81 mg tablet; Take 1 Tablet by mouth daily. , Normal, Disp-90 Tablet, R-1    Sick sinus syndrome (HCC)  -     aspirin delayed-release 81 mg tablet; Take 1 Tablet by mouth daily. , Normal, Disp-90 Tablet, R-1    Angina, class III (HCC)  -     aspirin delayed-release 81 mg tablet; Take 1 Tablet by mouth daily. , Normal, Disp-90 Tablet, R-1    Hypertension, well controlled  -     metoprolol succinate (TOPROL-XL) 50 mg XL tablet; Take 1 Tablet by mouth daily. , Normal, Disp-90 Tablet, R-1  -     aspirin delayed-release 81 mg tablet; Take 1 Tablet by mouth daily. , Normal, Disp-90 Tablet, R-1  -     chlorthalidone (HYGROTON) 25 mg tablet; Take 0.5 Tablets by mouth daily. , Normal, Disp-45 Tablet, R-1    S/P coronary artery stent placement  -     atorvastatin (Lipitor) 40 mg tablet; Take 1 Tablet by mouth daily. , Normal, Disp-90 Tablet, R-1  -     clopidogreL (PLAVIX) 75 mg tab; Take 1 Tablet by mouth daily. , Normal, Disp-90 Tablet, R-1  -     aspirin delayed-release 81 mg tablet; Take 1 Tablet by mouth daily. , Normal, Disp-90 Tablet, R-1    Hypercholesterolemia  -     atorvastatin (Lipitor) 40 mg tablet; Take 1 Tablet by mouth daily. , Normal, Disp-90 Tablet, R-1    Vitamin D deficiency  -     cholecalciferol (VITAMIN D3) (5000 Units /125 mcg) capsule; Take 1 Capsule by mouth daily. , Normal, Disp-90 Capsule, R-1    Pacemaker  -     clopidogreL (PLAVIX) 75 mg tab; Take 1 Tablet by mouth daily. , Normal, Disp-90 Tablet, R-1    Dementia without behavioral disturbance, unspecified dementia type (HCC)  -     donepeziL (ARICEPT) 5 mg tablet; Take 1 Tablet by mouth nightly., Normal, Disp-90 Tablet, R-1    Acquired hypothyroidism  -     levothyroxine (SYNTHROID) 100 mcg tablet; Take 1 Tablet by mouth Daily (before breakfast). , Normal, Disp-90 Tablet, R-1    Encounter for diabetic foot exam ([facility] [address]  -      DIABETES FOOT EXAM      Patient Instructions        Home Blood Pressure Test: About This Test  What is it? A home blood pressure test allows you to keep track of your blood pressure at home. Blood pressure is a measure of the force of blood against the walls of your arteries. Blood pressure readings include two numbers, such as 130/80 (say \"130 over 80\"). The first number is the systolic pressure. The second number is the diastolic pressure. Why is this test done? You may do this test at home to:  · Find out if you have high blood pressure. · Track your blood pressure if you have high blood pressure. · Track how well medicine is working to reduce high blood pressure. · Check how lifestyle changes, such as weight loss and exercise, are affecting blood pressure. How do you prepare for the test?  For at least 30 minutes before you take your blood pressure, don't exercise, drink caffeine, or smoke.  Empty your bladder before the test. Sit quietly with your back straight and both feet on the floor for at least 5 minutes. This helps you take your blood pressure while you feel comfortable and relaxed. How is the test done? · If your doctor recommends it, take your blood pressure twice a day. Take it in the morning and evening. · Sit with your arm slightly bent and resting on a table so that your upper arm is at the same level as your heart. · Use the same arm each time you take your blood pressure. · Place the blood pressure cuff on the bare skin of your upper arm. You may have to roll up your sleeve, remove your arm from the sleeve, or take your shirt off. · Wrap the blood pressure cuff around your upper arm so that the lower edge of the cuff is about 1 inch above the bend of your elbow. · Do not move, talk, or text while you take your blood pressure. Follow the instructions that came with your blood pressure monitor. They might be different from the following. · Press the on/off button on the automatic monitor. Then you may need to wait until the screen says the monitor is ready. · Press the start button. The cuff will inflate and deflate by itself. · Your blood pressure numbers will appear on the screen. · Wait one minute and take your blood pressure again. · If your monitor does not automatically save your numbers, write them in your log book, along with the date and time. Follow-up care is a key part of your treatment and safety. Be sure to make and go to all appointments, and call your doctor if you are having problems. It's also a good idea to keep a list of the medicines you take. Where can you learn more? Go to http://www.[employer].com/  Enter C427 in the search box to learn more about \"Home Blood Pressure Test: About This Test.\"  Current as of: August 31, 2020               Content Version: 12.8  © 1339-8058 [employer].    Care instructions adapted under license by [employer] (which disclaims liability or warranty for this information). If you have questions about a medical condition or this instruction, always ask your healthcare professional. [place] [address] any warranty or liability for your use of this information. Follow-up and Dispositions    · Return in about 1 week (around 8/3/2021) for f/u blood pressure and diabetes.

## 2021-07-27 NOTE — PATIENT INSTRUCTIONS

## 2021-07-28 VITALS
HEART RATE: 78 BPM | BODY MASS INDEX: 25.06 KG/M2 | HEIGHT: 71 IN | DIASTOLIC BLOOD PRESSURE: 66 MMHG | OXYGEN SATURATION: 97 % | TEMPERATURE: 95.2 F | RESPIRATION RATE: 16 BRPM | SYSTOLIC BLOOD PRESSURE: 108 MMHG | WEIGHT: 179 LBS

## 2021-08-02 ENCOUNTER — PATIENT OUTREACH (OUTPATIENT)
Dept: CASE MANAGEMENT | Age: 86
End: 2021-08-02

## 2021-08-02 NOTE — PROGRESS NOTES
Ambulatory Care Management Note      Date/Time:  8/2/2021 2:27 PM    Attempted to contact patient's daughter, John Lind, for care management follow-up. Messages left requesting call back.

## 2021-08-05 ENCOUNTER — OFFICE VISIT (OUTPATIENT)
Dept: FAMILY MEDICINE CLINIC | Age: 86
End: 2021-08-05
Payer: MEDICARE

## 2021-08-05 VITALS
SYSTOLIC BLOOD PRESSURE: 136 MMHG | DIASTOLIC BLOOD PRESSURE: 71 MMHG | BODY MASS INDEX: 24.92 KG/M2 | OXYGEN SATURATION: 98 % | TEMPERATURE: 96.2 F | RESPIRATION RATE: 20 BRPM | HEART RATE: 89 BPM | HEIGHT: 71 IN | WEIGHT: 178 LBS

## 2021-08-05 DIAGNOSIS — E11.49 TYPE II OR UNSPECIFIED TYPE DIABETES MELLITUS WITH NEUROLOGICAL MANIFESTATIONS, UNCONTROLLED(250.62) (HCC): Primary | ICD-10-CM

## 2021-08-05 DIAGNOSIS — I10 HYPERTENSION, WELL CONTROLLED: ICD-10-CM

## 2021-08-05 LAB — GLUCOSE POC: 160 MG/DL

## 2021-08-05 PROCEDURE — G8427 DOCREV CUR MEDS BY ELIG CLIN: HCPCS | Performed by: INTERNAL MEDICINE

## 2021-08-05 PROCEDURE — 99214 OFFICE O/P EST MOD 30 MIN: CPT | Performed by: INTERNAL MEDICINE

## 2021-08-05 PROCEDURE — G8536 NO DOC ELDER MAL SCRN: HCPCS | Performed by: INTERNAL MEDICINE

## 2021-08-05 PROCEDURE — G8432 DEP SCR NOT DOC, RNG: HCPCS | Performed by: INTERNAL MEDICINE

## 2021-08-05 PROCEDURE — G0463 HOSPITAL OUTPT CLINIC VISIT: HCPCS | Performed by: INTERNAL MEDICINE

## 2021-08-05 PROCEDURE — G8420 CALC BMI NORM PARAMETERS: HCPCS | Performed by: INTERNAL MEDICINE

## 2021-08-05 PROCEDURE — 1101F PT FALLS ASSESS-DOCD LE1/YR: CPT | Performed by: INTERNAL MEDICINE

## 2021-08-05 PROCEDURE — 82962 GLUCOSE BLOOD TEST: CPT | Performed by: INTERNAL MEDICINE

## 2021-08-05 RX ORDER — CHLORTHALIDONE 25 MG/1
12.5 TABLET ORAL DAILY
Qty: 45 TABLET | Refills: 1 | Status: SHIPPED | OUTPATIENT
Start: 2021-08-05 | End: 2021-11-05 | Stop reason: SDUPTHER

## 2021-08-05 RX ORDER — METOPROLOL SUCCINATE 50 MG/1
50 TABLET, EXTENDED RELEASE ORAL DAILY
Qty: 90 TABLET | Refills: 1 | Status: SHIPPED | OUTPATIENT
Start: 2021-08-05 | End: 2021-11-05 | Stop reason: SDUPTHER

## 2021-08-05 RX ORDER — BENAZEPRIL HYDROCHLORIDE 5 MG/1
5 TABLET ORAL DAILY
Qty: 30 TABLET | Refills: 3 | Status: SHIPPED | OUTPATIENT
Start: 2021-08-05 | End: 2021-11-05 | Stop reason: SDUPTHER

## 2021-08-05 NOTE — PROGRESS NOTES
Chief Complaint   Patient presents with    Blood Pressure Check     follow up     Blood sugar problem     follow up      HPI:  Denise Cordova is a 80 y.o. caucasioan male presents for blood pressure and blood sugar check. Blood pressure is not well controlled on metoprolol XL 50 mg and [place] 12.5 mg.   Blood sugar is up, she is not on any medication. All antidiabetics were stopped due to episode hypoglycemia. Home blood sugar reading goes up after eating. Review of Systems  As per hpi    Past Medical History:   Diagnosis Date    CAD (coronary artery disease)     CKD (chronic kidney disease) 2019    Diabetes ([address]     Essential hypertension     Hypercholesterolemia     S/P cardiac cath 2019    S/P coronary artery stent placement 2019 PCI/JOSE to distal LMT, ,prox LAD and LCX, distal RCA    Thyroid disease      Past Surgical History:   Procedure Laterality Date    HX PACEMAKER       Social History     Socioeconomic History    Marital status:      Spouse name: Not on file    Number of children: Not on file    Years of education: Not on file    Highest education level: Not on file   Tobacco Use    Smoking status: Former Smoker     Quit date: 1979     Years since quittin.9    Smokeless tobacco: Never Used   Vaping Use    Vaping Use: Never used   Substance and Sexual Activity    Alcohol use: Not Currently    Drug use: Never    Sexual activity: Not Currently     Social Determinants of Health     Financial Resource Strain:     Difficulty of Paying Living Expenses:    Food Insecurity:     Worried About Running Out of Food in the Last Year:     [address] in the Last Year:    Transportation Needs:     Lack of Transportation (Medical):      Lack of Transportation (Non-Medical):    Physical Activity:     Days of Exercise per Week:     Minutes of Exercise per Session:    Stress:     Feeling of Stress :    Social Connections:     Frequency of Communication with Friends and Family:     Frequency of Social Gatherings with Friends and Family:     Attends [employer] Services:     Active Member of Clubs or Organizations:     Attends Club or Organization Meetings:     Marital Status:      Family History   Problem Relation Age of Onset    Diabetes Mother     Heart Surgery Father     No Known Problems Sister     No Known Problems Brother     No Known Problems Sister     No Known Problems Sister      Current Outpatient Medications   Medication Sig Dispense Refill    atorvastatin (Lipitor) 40 mg tablet Take 1 Tablet by mouth daily. 90 Tablet 1    cholecalciferol (VITAMIN D3) (5000 Units /125 mcg) capsule Take 1 Capsule by mouth daily. 90 Capsule 1    clopidogreL (PLAVIX) 75 mg tab Take 1 Tablet by mouth daily. 90 Tablet 1    donepeziL (ARICEPT) 5 mg tablet Take 1 Tablet by mouth nightly. 90 Tablet 1    levothyroxine (SYNTHROID) 100 mcg tablet Take 1 Tablet by mouth Daily (before breakfast). 90 Tablet 1    metoprolol succinate (TOPROL-XL) 50 mg XL tablet Take 1 Tablet by mouth daily. 90 Tablet 1    aspirin delayed-release 81 mg tablet Take 1 Tablet by mouth daily. 90 Tablet 1    chlorthalidone (HYGROTON) 25 mg tablet Take 0.5 Tablets by mouth daily.  45 Tablet 1     No Known Allergies    Objective:  Visit Vitals  /71   Pulse 89   Temp (!) 96.2 °F (35.7 °C) (Oral)   Resp 20   Ht 5' 11\" (1.803 m)   Wt 178 lb (80.7 kg)   SpO2 98%   BMI 24.83 kg/m²     Physical Exam:   General appearance - alert, well appearing in no distress  Mental status - alert, oriented to person, place, and time  Chest - clear to auscultation, no wheezes, rales or rhonchi  Heart - normal rate, regular rhythm, no murmurs  Abdomen - soft, nontender, nondistended, no organomegaly  Ext-peripheral pulses normal, no pedal edema    Results for orders placed or performed in visit on 08/05/21   AMB POC GLUCOSE BLOOD, BY GLUCOSE MONITORING DEVICE   Result Value Ref Range    Glucose  MG/DL     Assessment/Plan:  Diagnoses and all orders for this visit:    Type II or unspecified type diabetes mellitus with neurological manifestations, uncontrolled(250.62) ([facility])  -     AMB POC GLUCOSE BLOOD, BY GLUCOSE MONITORING DEVICE  -     empagliflozin (JARDIANCE) 10 mg tablet; Take 1 Tablet by mouth daily. , Normal, Disp-30 Tablet, R-3    Hypertension, well controlled  -     benazepriL (LOTENSIN) 5 mg tablet; Take 1 Tablet by mouth daily. , Normal, Disp-30 Tablet, R-3  -     chlorthalidone (HYGROTON) 25 mg tablet; Take 0.5 Tablets by mouth daily. , Normal, Disp-45 Tablet, R-1  -     metoprolol succinate (TOPROL-XL) 50 mg XL tablet; Take 1 Tablet by mouth daily. , Normal, Disp-90 Tablet, R-1      Patient Instructions      Empagliflozin (Jardiance) - (By mouth)   Why this medicine is used:   Treats type 2 diabetes. Contact a nurse or doctor right away if you have:  · Trouble breathing, stomach pain, nausea, vomiting  · Change in how much or how often you urinate, bloody or cloudy urine  · Lightheadedness, dizziness, fainting, tiredness, shaking, trembling  · Increased hunger, confusion, sweating  · Lower back or side pain     Common side effects:  · Redness, itching, discharge, pain, or swelling of the penis  · Vaginal discharge, itching or odor  © 2017 [address] is for End User's use only and may not be sold, redistributed or otherwise used for commercial purposes. Follow-up and Dispositions    · Return in about 3 months (around 11/5/2021), or if symptoms worsen or fail to improve, for routine follow up.

## 2021-08-23 DIAGNOSIS — E11.21 TYPE 2 DIABETES WITH NEPHROPATHY (HCC): Primary | ICD-10-CM

## 2021-08-23 RX ORDER — GLIMEPIRIDE 1 MG/1
TABLET ORAL
Qty: 30 TABLET | Refills: 2 | Status: SHIPPED | OUTPATIENT
Start: 2021-08-23 | End: 2021-11-05 | Stop reason: SDUPTHER

## 2021-08-24 ENCOUNTER — PATIENT OUTREACH (OUTPATIENT)
Dept: CASE MANAGEMENT | Age: 86
End: 2021-08-24

## 2021-08-26 NOTE — PROGRESS NOTES
Ambulatory Care Management Note    Date/Time:  8/26/2021 3:38 PM    ACM has been unable to reach patient for continuing case management. Episode resolved.

## 2021-11-05 ENCOUNTER — OFFICE VISIT (OUTPATIENT)
Dept: FAMILY MEDICINE CLINIC | Age: 86
End: 2021-11-05
Payer: MEDICARE

## 2021-11-05 VITALS
BODY MASS INDEX: 24.92 KG/M2 | DIASTOLIC BLOOD PRESSURE: 89 MMHG | WEIGHT: 178 LBS | HEART RATE: 67 BPM | HEIGHT: 71 IN | SYSTOLIC BLOOD PRESSURE: 121 MMHG | TEMPERATURE: 96.9 F | OXYGEN SATURATION: 98 % | RESPIRATION RATE: 20 BRPM

## 2021-11-05 DIAGNOSIS — E11.51 TYPE 2 DIABETES MELLITUS WITH PERIPHERAL VASCULAR DISEASE (HCC): ICD-10-CM

## 2021-11-05 DIAGNOSIS — Z01.00 DIABETIC EYE EXAM (HCC): ICD-10-CM

## 2021-11-05 DIAGNOSIS — I10 HYPERTENSION, WELL CONTROLLED: ICD-10-CM

## 2021-11-05 DIAGNOSIS — Z00.00 ENCOUNTER FOR MEDICARE ANNUAL WELLNESS EXAM: Primary | ICD-10-CM

## 2021-11-05 DIAGNOSIS — Z95.0 PACEMAKER: ICD-10-CM

## 2021-11-05 DIAGNOSIS — E11.9 DIABETIC EYE EXAM (HCC): ICD-10-CM

## 2021-11-05 DIAGNOSIS — F03.90 DEMENTIA WITHOUT BEHAVIORAL DISTURBANCE, UNSPECIFIED DEMENTIA TYPE: ICD-10-CM

## 2021-11-05 DIAGNOSIS — E55.9 VITAMIN D DEFICIENCY: ICD-10-CM

## 2021-11-05 DIAGNOSIS — E03.9 ACQUIRED HYPOTHYROIDISM: ICD-10-CM

## 2021-11-05 DIAGNOSIS — E11.21 TYPE 2 DIABETES WITH NEPHROPATHY (HCC): ICD-10-CM

## 2021-11-05 DIAGNOSIS — E78.00 HYPERCHOLESTEROLEMIA: ICD-10-CM

## 2021-11-05 DIAGNOSIS — Z12.5 SCREENING FOR PROSTATE CANCER: ICD-10-CM

## 2021-11-05 DIAGNOSIS — Z95.5 S/P CORONARY ARTERY STENT PLACEMENT: ICD-10-CM

## 2021-11-05 LAB
GLUCOSE POC: 127 MG/DL
HBA1C MFR BLD HPLC: 6.2 %

## 2021-11-05 PROCEDURE — G8420 CALC BMI NORM PARAMETERS: HCPCS | Performed by: INTERNAL MEDICINE

## 2021-11-05 PROCEDURE — 83036 HEMOGLOBIN GLYCOSYLATED A1C: CPT | Performed by: INTERNAL MEDICINE

## 2021-11-05 PROCEDURE — G0439 PPPS, SUBSEQ VISIT: HCPCS | Performed by: INTERNAL MEDICINE

## 2021-11-05 PROCEDURE — 82962 GLUCOSE BLOOD TEST: CPT | Performed by: INTERNAL MEDICINE

## 2021-11-05 PROCEDURE — 1101F PT FALLS ASSESS-DOCD LE1/YR: CPT | Performed by: INTERNAL MEDICINE

## 2021-11-05 PROCEDURE — G8510 SCR DEP NEG, NO PLAN REQD: HCPCS | Performed by: INTERNAL MEDICINE

## 2021-11-05 PROCEDURE — G8427 DOCREV CUR MEDS BY ELIG CLIN: HCPCS | Performed by: INTERNAL MEDICINE

## 2021-11-05 PROCEDURE — G8536 NO DOC ELDER MAL SCRN: HCPCS | Performed by: INTERNAL MEDICINE

## 2021-11-05 RX ORDER — BENAZEPRIL HYDROCHLORIDE 5 MG/1
5 TABLET ORAL DAILY
Qty: 30 TABLET | Refills: 3 | Status: SHIPPED | OUTPATIENT
Start: 2021-11-05 | End: 2022-07-08

## 2021-11-05 RX ORDER — ATORVASTATIN CALCIUM 40 MG/1
40 TABLET, FILM COATED ORAL DAILY
Qty: 90 TABLET | Refills: 1 | Status: SHIPPED | OUTPATIENT
Start: 2021-11-05 | End: 2022-08-12

## 2021-11-05 RX ORDER — DONEPEZIL HYDROCHLORIDE 5 MG/1
5 TABLET, FILM COATED ORAL
Qty: 90 TABLET | Refills: 1 | Status: SHIPPED | OUTPATIENT
Start: 2021-11-05 | End: 2022-08-12

## 2021-11-05 RX ORDER — CLOPIDOGREL BISULFATE 75 MG/1
75 TABLET ORAL DAILY
Qty: 90 TABLET | Refills: 1 | Status: SHIPPED | OUTPATIENT
Start: 2021-11-05 | End: 2022-07-27

## 2021-11-05 RX ORDER — CHOLECALCIFEROL (VITAMIN D3) 125 MCG
5000 CAPSULE ORAL DAILY
Qty: 90 CAPSULE | Refills: 1 | Status: SHIPPED | OUTPATIENT
Start: 2021-11-05

## 2021-11-05 RX ORDER — METOPROLOL SUCCINATE 50 MG/1
50 TABLET, EXTENDED RELEASE ORAL DAILY
Qty: 90 TABLET | Refills: 1 | Status: SHIPPED | OUTPATIENT
Start: 2021-11-05

## 2021-11-05 RX ORDER — CHLORTHALIDONE 25 MG/1
12.5 TABLET ORAL DAILY
Qty: 45 TABLET | Refills: 1 | Status: SHIPPED | OUTPATIENT
Start: 2021-11-05

## 2021-11-05 RX ORDER — GLIMEPIRIDE 1 MG/1
TABLET ORAL
Qty: 30 TABLET | Refills: 2 | Status: SHIPPED | OUTPATIENT
Start: 2021-11-05 | End: 2022-05-17

## 2021-11-05 RX ORDER — LEVOTHYROXINE SODIUM 100 UG/1
100 TABLET ORAL
Qty: 90 TABLET | Refills: 1 | Status: SHIPPED | OUTPATIENT
Start: 2021-11-05 | End: 2022-08-12

## 2021-11-05 NOTE — PATIENT INSTRUCTIONS
Well Visit, Over 72: Care Instructions  Overview     Well visits can help you stay healthy. Your doctor has checked your overall health and may have suggested ways to take good care of yourself. Your doctor also may have recommended tests. At home, you can help prevent illness with healthy eating, regular exercise, and other steps. Follow-up care is a key part of your treatment and safety. Be sure to make and go to all appointments, and call your doctor if you are having problems. It's also a good idea to know your test results and keep a list of the medicines you take. How can you care for yourself at home? · Get screening tests that you and your doctor decide on. Screening helps find diseases before any symptoms appear. · Eat healthy foods. Choose fruits, vegetables, whole grains, protein, and low-fat dairy foods. Limit fat, especially saturated fat. Reduce salt in your diet. · Limit alcohol. If you are a man, have no more than 2 drinks a day or 14 drinks a week. If you are a woman, have no more than 1 drink a day or 7 drinks a week. Since alcohol affects older adults differently, you may want to limit alcohol even more. Or you may not want to drink at all. · Get at least 30 minutes of exercise on most days of the week. Walking is a good choice. You also may want to do other activities, such as running, swimming, cycling, or playing tennis or team sports. · Reach and stay at a healthy weight. This will lower your risk for many problems, such as obesity, diabetes, heart disease, and high blood pressure. · Do not smoke. Smoking can make health problems worse. If you need help quitting, talk to your doctor about stop-smoking programs and medicines. These can increase your chances of quitting for good. · Care for your mental health. It is easy to get weighed down by worry and stress. Learn strategies to manage stress, like deep breathing and mindfulness, and stay connected with your family and community. If you find you often feel sad or hopeless, talk with your doctor. Treatment can help. · Talk to your doctor about whether you have any risk factors for sexually transmitted infections (STIs). You can help prevent STIs if you wait to have sex with a new partner (or partners) until you've each been tested for STIs. It also helps if you use condoms (male or female condoms) and if you limit your sex partners to one person who only has sex with you. Vaccines are available for some STIs. · If you think you may have a problem with alcohol or drug use, talk to your doctor. This includes prescription medicines (such as amphetamines and opioids) and illegal drugs (such as cocaine and methamphetamine). Your doctor can help you figure out what type of treatment is best for you. · Protect your skin from too much sun. When you're outdoors from 10 a.m. to 4 p.m., stay in the shade or cover up with clothing and a hat with a wide brim. Wear sunglasses that block UV rays. Even when it's cloudy, put broad-spectrum sunscreen (SPF 30 or higher) on any exposed skin. · See a dentist one or two times a year for checkups and to have your teeth cleaned. · Wear a seat belt in the car. When should you call for help? Watch closely for changes in your health, and be sure to contact your doctor if you have any problems or symptoms that concern you. Where can you learn more? Go to http://www.gray.com/  Enter [id number] in the search box to learn more about \"Well Visit, Over 65: Care Instructions. \"  Current as of: February 11, 2021               Content Version: 13.0  © 7408-8468 Healthwise, Incorporated. Care instructions adapted under license by [employer] (which disclaims liability or warranty for this information).  If you have questions about a medical condition or this instruction, always ask your healthcare professional. [address] any warranty or liability for your use of this information.

## 2021-11-05 NOTE — PROGRESS NOTES
Chief Complaint   Patient presents with    Annual Wellness Visit     HPI:  Dhaval Woodall is a 80 y.o.  male with h/o hypertension, hypercholesterolemia, diabetes type 2, CKD, CAD, s/p cardiac cath, bilateral carotid artery stenosis presents accompanied by daughter for medicare wellness visit. Patient is doing well. Blood pressure is at goal. A1C is under 7% indicating good glycemic control. This is a Subsequent Medicare Annual Wellness Exam (AWV) (Performed 12 months after IPPE or effective date of Medicare Part B enrollment)  I have reviewed the patient's medical history in detail and updated the computerized patient record. History     Past Medical History:   Diagnosis Date    CAD (coronary artery disease)     CKD (chronic kidney disease) 9/24/2019    Diabetes ([address]     Essential hypertension     Hypercholesterolemia     S/P cardiac cath 9/24/2019    S/P coronary artery stent placement 9/25/2019 9/24/19 PCI/JOSE to distal LMT, ,prox LAD and LCX, distal RCA    Thyroid disease       Past Surgical History:   Procedure Laterality Date    HX PACEMAKER       Current Outpatient Medications   Medication Sig Dispense Refill    atorvastatin (Lipitor) 40 mg tablet Take 1 Tablet by mouth daily. 90 Tablet 1    benazepriL (LOTENSIN) 5 mg tablet Take 1 Tablet by mouth daily. 30 Tablet 3    chlorthalidone (HYGROTON) 25 mg tablet Take 0.5 Tablets by mouth daily. 45 Tablet 1    cholecalciferol (VITAMIN D3) (5000 Units /125 mcg) capsule Take 1 Capsule by mouth daily. 90 Capsule 1    clopidogreL (PLAVIX) 75 mg tab Take 1 Tablet by mouth daily. 90 Tablet 1    donepeziL (ARICEPT) 5 mg tablet Take 1 Tablet by mouth nightly. 90 Tablet 1    levothyroxine (SYNTHROID) 100 mcg tablet Take 1 Tablet by mouth Daily (before breakfast). 90 Tablet 1    metoprolol succinate (TOPROL-XL) 50 mg XL tablet Take 1 Tablet by mouth daily.  90 Tablet 1    glimepiride (AMARYL) 1 mg tablet Take 1 tab a day with meal 30 Tablet 2    aspirin delayed-release 81 mg tablet Take 1 Tablet by mouth daily. 90 Tablet 1    empagliflozin (JARDIANCE) 10 mg tablet Take 1 Tablet by mouth daily.  (Patient not taking: Reported on 2021) 30 Tablet 3     No Known Allergies  Family History   Problem Relation Age of Onset    Diabetes Mother     Heart Surgery Father     No Known Problems Sister     No Known Problems Brother     No Known Problems Sister     No Known Problems Sister      Social History     Tobacco Use    Smoking status: Former Smoker     Quit date: 1979     Years since quittin.2    Smokeless tobacco: Never Used   Substance Use Topics    Alcohol use: Not Currently     Patient Active Problem List   Diagnosis Code    Hypertension I10    Coronary artery disease involving native coronary artery of native heart I25.10    Controlled type 2 diabetes mellitus, without long-term current use of insulin ([facility]) E11.9    Pacemaker Z95.0    Elevated blood pressure affecting pregnancy in first trimester, antepartum O16.1    Angina, class III ([facility]) I20.9    Bilateral carotid artery stenosis I65.23    CKD (chronic kidney disease) N18.9    S/P cardiac cath Z98.890    S/P coronary artery stent placement Z95.5    Type 2 diabetes with nephropathy ([facility]) E11.21    Type 2 diabetes mellitus with peripheral vascular disease ([facility]) E11.51    CKD (chronic kidney disease) stage 3, GFR 30-59 ml/min ([facility]) N18.30       Depression Risk Factor Screening:     3 most recent PHQ Screens 2021   Little interest or pleasure in doing things Not at all   Feeling down, depressed, irritable, or hopeless Not at all   Total Score PHQ 2 0     Alcohol Risk Factor Screening:   Do you average more than 1 drink per night or more than 7 drinks a week:  No    On any one occasion in the past three months have you have had more than 3 drinks containing alcohol:  No    Functional Ability and Level of Safety:   Hearing Loss  Hearing is good.    Activities of Daily Living  The home contains: no safety equipment. Patient does total self care    Fall Risk  Fall Risk Assessment, last 12 mths 11/5/2021   Able to walk? Yes   Fall in past 12 months? 0   Do you feel unsteady? 0   Are you worried about falling 0     Abuse Screen  Patient is not abused    Cognitive Screening   Evaluation of Cognitive Function:  Has your family/caregiver stated any concerns about your memory: no  Normal    Patient Care Team   Patient Care Team:  Ambrose Oropeza MD as PCP - General (Internal Medicine)  Ambrose Oropeza MD as PCP - [address] [place] [address] [facility]  Quintin Martinez MD as Physician (Cardiology)    Assessment/Plan   Education and counseling provided:  Are appropriate based on today's review and evaluation  Diagnoses and all orders for this visit:    Encounter for Medicare annual wellness exam  -     METABOLIC PANEL, COMPREHENSIVE; Future  -     CBC W/O DIFF; Future    Type 2 diabetes with nephropathy (HCC)  -     atorvastatin (Lipitor) 40 mg tablet; Take 1 Tablet by mouth daily. , Normal, Disp-90 Tablet, R-1  -     glimepiride (AMARYL) 1 mg tablet; Take 1 tab a day with meal, Normal, Disp-30 Tablet, R-2  -     AMB POC HEMOGLOBIN A1C  -     AMB POC GLUCOSE BLOOD, BY GLUCOSE MONITORING DEVICE  -     METABOLIC PANEL, COMPREHENSIVE; Future  -     CBC W/O DIFF; Future  -     MICROALBUMIN, UR, RAND W/ MICROALB/CREAT RATIO; Future    Type 2 diabetes mellitus with peripheral vascular disease (HCC)  -     atorvastatin (Lipitor) 40 mg tablet; Take 1 Tablet by mouth daily. , Normal, Disp-90 Tablet, R-1  -     glimepiride (AMARYL) 1 mg tablet; Take 1 tab a day with meal, Normal, Disp-30 Tablet, R-2  -     AMB POC HEMOGLOBIN A1C  -     AMB POC GLUCOSE BLOOD, BY GLUCOSE MONITORING DEVICE  -     METABOLIC PANEL, COMPREHENSIVE; Future  -     CBC W/O DIFF;  Future  -     MICROALBUMIN, UR, RAND W/ MICROALB/CREAT RATIO; Future    S/P coronary artery stent placement  - atorvastatin (Lipitor) 40 mg tablet; Take 1 Tablet by mouth daily. , Normal, Disp-90 Tablet, R-1  -     clopidogreL (PLAVIX) 75 mg tab; Take 1 Tablet by mouth daily. , Normal, Disp-90 Tablet, R-1  -     METABOLIC PANEL, COMPREHENSIVE; Future  -     CBC W/O DIFF; Future  -     LIPID PANEL; Future    Hypercholesterolemia  -     atorvastatin (Lipitor) 40 mg tablet; Take 1 Tablet by mouth daily. , Normal, Disp-90 Tablet, R-1  -     LIPID PANEL; Future    Hypertension, well controlled  -     benazepriL (LOTENSIN) 5 mg tablet; Take 1 Tablet by mouth daily. , Normal, Disp-30 Tablet, R-3  -     chlorthalidone (HYGROTON) 25 mg tablet; Take 0.5 Tablets by mouth daily. , Normal, Disp-45 Tablet, R-1  -     metoprolol succinate (TOPROL-XL) 50 mg XL tablet; Take 1 Tablet by mouth daily. , Normal, Disp-90 Tablet, R-1  -     METABOLIC PANEL, COMPREHENSIVE; Future  -     CBC W/O DIFF; Future    Vitamin D deficiency  -     cholecalciferol (VITAMIN D3) (5000 Units /125 mcg) capsule; Take 1 Capsule by mouth daily. , Normal, Disp-90 Capsule, R-1  -     VITAMIN D, 25 HYDROXY; Future    Pacemaker  -     clopidogreL (PLAVIX) 75 mg tab; Take 1 Tablet by mouth daily. , Normal, Disp-90 Tablet, R-1    Dementia without behavioral disturbance, unspecified dementia type (HCC)  -     donepeziL (ARICEPT) 5 mg tablet; Take 1 Tablet by mouth nightly., Normal, Disp-90 Tablet, R-1  -     METABOLIC PANEL, COMPREHENSIVE; Future  -     CBC W/O DIFF; Future    Acquired hypothyroidism  -     levothyroxine (SYNTHROID) 100 mcg tablet; Take 1 Tablet by mouth Daily (before breakfast). , Normal, Disp-90 Tablet, R-1  -     TSH 3RD GENERATION; Future    Diabetic eye exam ([facility] [address] [address]  -     REFERRAL TO OPHTHALMOLOGY    Screening for prostate cancer  -     PSA SCREENING (SCREENING); Future      Patient Instructions        Well Visit, Over 72: Care Instructions  Overview     Well visits can help you stay healthy.  Your doctor has checked your overall health and may have suggested ways to take good care of yourself. Your doctor also may have recommended tests. At home, you can help prevent illness with healthy eating, regular exercise, and other steps. Follow-up care is a key part of your treatment and safety. Be sure to make and go to all appointments, and call your doctor if you are having problems. It's also a good idea to know your test results and keep a list of the medicines you take. How can you care for yourself at home? · Get screening tests that you and your doctor decide on. Screening helps find diseases before any symptoms appear. · Eat healthy foods. Choose fruits, vegetables, whole grains, protein, and low-fat dairy foods. Limit fat, especially saturated fat. Reduce salt in your diet. · Limit alcohol. If you are a man, have no more than 2 drinks a day or 14 drinks a week. If you are a woman, have no more than 1 drink a day or 7 drinks a week. Since alcohol affects older adults differently, you may want to limit alcohol even more. Or you may not want to drink at all. · Get at least 30 minutes of exercise on most days of the week. Walking is a good choice. You also may want to do other activities, such as running, swimming, cycling, or playing tennis or team sports. · Reach and stay at a healthy weight. This will lower your risk for many problems, such as obesity, diabetes, heart disease, and high blood pressure. · Do not smoke. Smoking can make health problems worse. If you need help quitting, talk to your doctor about stop-smoking programs and medicines. These can increase your chances of quitting for good. · Care for your mental health. It is easy to get weighed down by worry and stress. Learn strategies to manage stress, like deep breathing and mindfulness, and stay connected with your family and community. If you find you often feel sad or hopeless, talk with your doctor. Treatment can help.   · Talk to your doctor about whether you have any risk factors for sexually transmitted infections (STIs). You can help prevent STIs if you wait to have sex with a new partner (or partners) until you've each been tested for STIs. It also helps if you use condoms (male or female condoms) and if you limit your sex partners to one person who only has sex with you. Vaccines are available for some STIs. · If you think you may have a problem with alcohol or drug use, talk to your doctor. This includes prescription medicines (such as amphetamines and opioids) and illegal drugs (such as cocaine and methamphetamine). Your doctor can help you figure out what type of treatment is best for you. · Protect your skin from too much sun. When you're outdoors from 10 a.m. to 4 p.m., stay in the shade or cover up with clothing and a hat with a wide brim. Wear sunglasses that block UV rays. Even when it's cloudy, put broad-spectrum sunscreen (SPF 30 or higher) on any exposed skin. · See a dentist one or two times a year for checkups and to have your teeth cleaned. · Wear a seat belt in the car. When should you call for help? Watch closely for changes in your health, and be sure to contact your doctor if you have any problems or symptoms that concern you. Where can you learn more? Go to http://www.gray.com/  Enter [id number] in the search box to learn more about \"Well Visit, Over 65: Care Instructions. \"  Current as of: February 11, 2021               Content Version: 13.0  © 8784-0000 Healthwise, Incorporated. Care instructions adapted under license by [employer] (which disclaims liability or warranty for this information). If you have questions about a medical condition or this instruction, always ask your healthcare professional. [place] [address] any warranty or liability for your use of this information. Follow-up and Dispositions    · Return in about 4 months (around 3/5/2022) for routine follow up.

## 2021-11-24 ENCOUNTER — TELEPHONE (OUTPATIENT)
Dept: CARDIOLOGY CLINIC | Age: 86
End: 2021-11-24

## 2021-11-24 NOTE — TELEPHONE ENCOUNTER
LVM for patient's daughter to let her know that we haven't received a transmission nor seen patient in the office since 2020. I called to see if we can get an update or see if we can set up an appointment. Left office number so daughter can call back.

## 2021-11-26 ENCOUNTER — DOCUMENTATION ONLY (OUTPATIENT)
Dept: FAMILY MEDICINE CLINIC | Age: 86
End: 2021-11-26

## 2022-01-24 NOTE — PROGRESS NOTES
Subjective: Belinda Simpson is a 80 y.o. male is here for EP f/u appt. PMHx of CAD with stents, hypothyroid, HTN, DM. The patient denies chest pain, orthopnea, PND, LE edema, palpitations, syncope, presyncope or fatigue. He reports he has been feeling well overall.      Patient Active Problem List    Diagnosis Date Noted    Type 2 diabetes with nephropathy ([address] 10/21/2020    Type 2 diabetes mellitus with peripheral vascular disease ([address] 10/21/2020    CKD (chronic kidney disease) stage 3, GFR 30-59 ml/min ([address] 10/21/2020    S/P coronary artery stent placement 09/25/2019    CKD (chronic kidney disease) 09/24/2019    S/P cardiac cath 09/24/2019    Angina, class III ([address] 09/23/2019    Bilateral carotid artery stenosis 09/23/2019    Elevated blood pressure affecting pregnancy in first trimester, antepartum 09/18/2019    Hypertension 08/27/2019    Coronary artery disease involving native coronary artery of native heart 08/27/2019    Controlled type 2 diabetes mellitus, without long-term current use of insulin ([address] 08/27/2019    Pacemaker 08/27/2019      Dalton Whitmore MD  Past Medical History:   Diagnosis Date    CAD (coronary artery disease)     Carotid artery disease ([address]     CKD (chronic kidney disease) 9/24/2019    Diabetes ([address]     Essential hypertension     Hypercholesterolemia     Long term current use of anticoagulant therapy     Pacemaker 04/2015    MDT    S/P cardiac cath 9/24/2019    S/P coronary artery stent placement 9/25/2019 9/24/19 PCI/JOSE to distal LMT, ,prox LAD and LCX, distal RCA    Thyroid disease       Past Surgical History:   Procedure Laterality Date    HX CORONARY STENT PLACEMENT      HX HEART CATHETERIZATION      HX PACEMAKER       No Known Allergies   Family History   Problem Relation Age of Onset    Diabetes Mother     Heart Surgery Father     No Known Problems Sister     No Known Problems Brother     No Known Problems Sister     No Known Problems Sister     negative for cardiac disease  Social History     Socioeconomic History    Marital status:    Tobacco Use    Smoking status: Former Smoker     Quit date: 1979     Years since quittin.4    Smokeless tobacco: Never Used   Vaping Use    Vaping Use: Never used   Substance and Sexual Activity    Alcohol use: Not Currently    Drug use: Never    Sexual activity: Not Currently     Current Outpatient Medications   Medication Sig    atorvastatin (Lipitor) 40 mg tablet Take 1 Tablet by mouth daily.  benazepriL (LOTENSIN) 5 mg tablet Take 1 Tablet by mouth daily.  chlorthalidone (HYGROTON) 25 mg tablet Take 0.5 Tablets by mouth daily.  cholecalciferol (VITAMIN D3) (5000 Units /125 mcg) capsule Take 1 Capsule by mouth daily.  clopidogreL (PLAVIX) 75 mg tab Take 1 Tablet by mouth daily.  levothyroxine (SYNTHROID) 100 mcg tablet Take 1 Tablet by mouth Daily (before breakfast).  metoprolol succinate (TOPROL-XL) 50 mg XL tablet Take 1 Tablet by mouth daily.  glimepiride (AMARYL) 1 mg tablet Take 1 tab a day with meal    aspirin delayed-release 81 mg tablet Take 1 Tablet by mouth daily.  donepeziL (ARICEPT) 5 mg tablet Take 1 Tablet by mouth nightly. (Patient not taking: Reported on 2022)    empagliflozin (JARDIANCE) 10 mg tablet Take 1 Tablet by mouth daily. (Patient not taking: Reported on 2021)     No current facility-administered medications for this visit. Vitals:    22 1041   BP: (!) 140/80   Pulse: 100   Resp: 16   SpO2: 96%   Weight: 173 lb 3.2 oz (78.6 kg)   Height: 5' 11\" (1.803 m)       I have reviewed the nurses notes, vitals, problem list, allergy list, medical history, family, social history and medications. Review of Symptoms:    General: Pt denies excessive weight gain or loss. Pt is able to conduct ADL's  HEENT: Denies blurred vision, headaches, epistaxis and difficulty swallowing.   Respiratory: Reports shortness of breath, CORDOVA. Cardiovascular: Denies precordial pain, palpitations, edema or PND  Gastrointestinal: Denies poor appetite, indigestion, abdominal pain or blood in stool  Urinary: Denies dysuria, pyuria  Musculoskeletal: Denies pain or swelling from muscles or joints  Neurologic: Denies tremor, paresthesias, or sensory motor disturbance. Reports dizziness. Skin: Denies rash, itching or texture change. Psych: Denies depression    Physical Exam:      General: Well developed, in no acute distress. HEENT: Eyes - PERRL, no jvd  Heart:  Normal S1/S2 negative S3 or S4. irregular, no murmur, gallop or rub. Respiratory: Clear bilaterally x 4, no wheezing or rales  Extremities:  No edema, normal cap refill, no cyanosis. Musculoskeletal: No clubbing  Neuro: alert, speech clear, gait stable. Skin: Skin color is normal. No rashes or lesions. Non diaphoretic  Vascular: 2+ pulses symmetric in all extremities    Cardiographics    Ekg: V paced     Results for orders placed or performed during the hospital encounter of 07/02/21   EKG, 12 LEAD, INITIAL   Result Value Ref Range    Ventricular Rate 68 BPM    Atrial Rate 68 BPM    P-R Interval 344 ms    QRS Duration 160 ms    Q-T Interval 474 ms    QTC Calculation (Bezet) 504 ms    Calculated P Axis 33 degrees    Calculated R Axis -105 degrees    Calculated T Axis 68 degrees    Diagnosis       Atrial-sensed ventricular-paced rhythm with prolonged AV conduction  When compared with ECG of 24-SEP-2019 16:04,  Vent.  rate has decreased BY   9 BPM  Confirmed by Geeta Grigsby ([id number]) on 7/3/2021 6:29:35 PM           Lab Results   Component Value Date/Time    WBC 11.5 (H) 11/05/2021 03:50 PM    HGB 13.2 11/05/2021 03:50 PM    HCT 41.6 11/05/2021 03:50 PM    PLATELET 663 35/31/2529 03:50 PM    MCV 92.2 11/05/2021 03:50 PM      Lab Results   Component Value Date/Time    Sodium 132 (L) 11/05/2021 03:50 PM    Potassium 4.1 11/05/2021 03:50 PM    Chloride 98 11/05/2021 03:50 PM CO2 21 11/05/2021 03:50 PM    Anion gap 13 11/05/2021 03:50 PM    Glucose 136 (H) 11/05/2021 03:50 PM    BUN 42 (H) 11/05/2021 03:50 PM    Creatinine 2.25 (H) 11/05/2021 03:50 PM    BUN/Creatinine ratio 19 11/05/2021 03:50 PM    GFR est AA 34 (L) 11/05/2021 03:50 PM    GFR est non-AA 28 (L) 11/05/2021 03:50 PM    Calcium 8.9 11/05/2021 03:50 PM    Bilirubin, total 0.9 11/05/2021 03:50 PM    Alk. phosphatase 102 11/05/2021 03:50 PM    Protein, total 7.6 11/05/2021 03:50 PM    Albumin 4.2 11/05/2021 03:50 PM    Globulin 3.4 11/05/2021 03:50 PM    A-G Ratio 1.2 11/05/2021 03:50 PM    ALT (SGPT) 32 11/05/2021 03:50 PM      Lab Results   Component Value Date/Time    TSH 8.11 (H) 11/05/2021 03:50 PM        Assessment:             ICD-10-CM ICD-9-CM    1. Sick sinus syndrome (HCC)  I49.5 427.81 AMB POC EKG ROUTINE W/ 12 LEADS, INTER & REP   2. Cardiac pacemaker in situ  Z95.0 V45.01 AMB POC EKG ROUTINE W/ 12 LEADS, INTER & REP   3. Primary hypertension  I10 401.9 AMB POC EKG ROUTINE W/ 12 LEADS, INTER & REP   4. Coronary artery disease involving native coronary artery of native heart without angina pectoris  I25.10 414.01 AMB POC EKG ROUTINE W/ 12 LEADS, INTER & REP   5. Type 2 diabetes with nephropathy (HCC)  E11.21 250.40 AMB POC EKG ROUTINE W/ 12 LEADS, INTER & REP     583.81      Orders Placed This Encounter    AMB POC EKG ROUTINE W/ 12 LEADS, INTER & REP     Order Specific Question:   Reason for Exam:     Answer:   routine        Plan:     Mr Jessica Bartholomew is here today for an EP follow up appt. He has dual chamber pm, 24.2% AP and 96.1% RVP with 3 years remaining battery. He is noted to have frequent PACs causing his HR elevation/ today. He has had NSVT that were brief, lasting 6 beats, none since 12/8/21. 1 episode of AT 1/5/22 lasting 1 sec.  Discussed with patient obtaining lexiscan stress test and echo with this increased ectopy/NSVT due to the fact that this may be indicating changes in his circulation or heart valve leakiness and he declines to do so at this time. Discussed increasing his Metoprolol dose and he declines this as well. Additionally discussed labs, he wants to have this done with PCP. Continue medical management for dementia, HTN, CAD and DM. Recommended he keep his already scheduled appt with Dr Brittany Jenkins to further discuss. Thank you for allowing me to participate in Torres Gavin 's care.       Eugene Horton NP

## 2022-01-26 ENCOUNTER — OFFICE VISIT (OUTPATIENT)
Dept: CARDIOLOGY CLINIC | Age: 87
End: 2022-01-26
Payer: MEDICARE

## 2022-01-26 VITALS
SYSTOLIC BLOOD PRESSURE: 140 MMHG | WEIGHT: 173.2 LBS | HEART RATE: 100 BPM | HEIGHT: 71 IN | OXYGEN SATURATION: 96 % | BODY MASS INDEX: 24.25 KG/M2 | RESPIRATION RATE: 16 BRPM | DIASTOLIC BLOOD PRESSURE: 80 MMHG

## 2022-01-26 DIAGNOSIS — I49.5 SICK SINUS SYNDROME (HCC): Primary | ICD-10-CM

## 2022-01-26 DIAGNOSIS — I10 PRIMARY HYPERTENSION: ICD-10-CM

## 2022-01-26 DIAGNOSIS — Z95.0 CARDIAC PACEMAKER IN SITU: Primary | ICD-10-CM

## 2022-01-26 DIAGNOSIS — I49.5 SICK SINUS SYNDROME (HCC): ICD-10-CM

## 2022-01-26 DIAGNOSIS — Z95.0 CARDIAC PACEMAKER IN SITU: ICD-10-CM

## 2022-01-26 DIAGNOSIS — E11.21 TYPE 2 DIABETES WITH NEPHROPATHY (HCC): ICD-10-CM

## 2022-01-26 DIAGNOSIS — I25.10 CORONARY ARTERY DISEASE INVOLVING NATIVE CORONARY ARTERY OF NATIVE HEART WITHOUT ANGINA PECTORIS: ICD-10-CM

## 2022-01-26 PROCEDURE — 1101F PT FALLS ASSESS-DOCD LE1/YR: CPT | Performed by: NURSE PRACTITIONER

## 2022-01-26 PROCEDURE — G8432 DEP SCR NOT DOC, RNG: HCPCS | Performed by: NURSE PRACTITIONER

## 2022-01-26 PROCEDURE — 99204 OFFICE O/P NEW MOD 45 MIN: CPT | Performed by: NURSE PRACTITIONER

## 2022-01-26 PROCEDURE — G0463 HOSPITAL OUTPT CLINIC VISIT: HCPCS | Performed by: NURSE PRACTITIONER

## 2022-01-26 PROCEDURE — 93005 ELECTROCARDIOGRAM TRACING: CPT | Performed by: NURSE PRACTITIONER

## 2022-01-26 PROCEDURE — G8536 NO DOC ELDER MAL SCRN: HCPCS | Performed by: NURSE PRACTITIONER

## 2022-01-26 PROCEDURE — G8427 DOCREV CUR MEDS BY ELIG CLIN: HCPCS | Performed by: NURSE PRACTITIONER

## 2022-01-26 PROCEDURE — 93280 PM DEVICE PROGR EVAL DUAL: CPT | Performed by: INTERNAL MEDICINE

## 2022-01-26 PROCEDURE — 93010 ELECTROCARDIOGRAM REPORT: CPT | Performed by: NURSE PRACTITIONER

## 2022-01-26 PROCEDURE — G8420 CALC BMI NORM PARAMETERS: HCPCS | Performed by: NURSE PRACTITIONER

## 2022-01-26 NOTE — PROGRESS NOTES
Chief Complaint   Patient presents with    Follow-up    Coronary Artery Disease    Hypertension    Irregular Heart Beat     1. Have you been to the ER, urgent care clinic since your last visit? No  Hospitalized since your last visit? Yes 7/21    2. Have you seen or consulted any other health care providers outside of the [address] [place] [address] since your last visit? No Include any pap smears or colon screening.  No

## 2022-01-26 NOTE — LETTER
1/26/2022    Patient: Belinda Simpson   YOB: 1934   Date of Visit: 1/26/2022     Benjie Rehman MD  [address] [facility]  [address] [place] [address]  Via [facility] [address]    Dear Benjie Rehman MD,      Thank you for referring Mr. Foreign Solis to [address] [place] [address] for evaluation. My notes for this consultation are attached. If you have questions, please do not hesitate to call me. I look forward to following your patient along with you.       Sincerely,    Naif Solis NP

## 2022-03-07 ENCOUNTER — OFFICE VISIT (OUTPATIENT)
Dept: CARDIOLOGY CLINIC | Age: 87
End: 2022-03-07
Payer: MEDICARE

## 2022-03-07 VITALS
SYSTOLIC BLOOD PRESSURE: 118 MMHG | HEIGHT: 71 IN | DIASTOLIC BLOOD PRESSURE: 70 MMHG | HEART RATE: 87 BPM | RESPIRATION RATE: 18 BRPM | OXYGEN SATURATION: 99 % | WEIGHT: 170.7 LBS | BODY MASS INDEX: 23.9 KG/M2

## 2022-03-07 DIAGNOSIS — I10 PRIMARY HYPERTENSION: ICD-10-CM

## 2022-03-07 DIAGNOSIS — E78.2 MIXED HYPERLIPIDEMIA: ICD-10-CM

## 2022-03-07 DIAGNOSIS — I25.10 CORONARY ARTERY DISEASE INVOLVING NATIVE CORONARY ARTERY OF NATIVE HEART WITHOUT ANGINA PECTORIS: Primary | ICD-10-CM

## 2022-03-07 DIAGNOSIS — Z95.0 CARDIAC PACEMAKER IN SITU: ICD-10-CM

## 2022-03-07 DIAGNOSIS — I47.29 NSVT (NONSUSTAINED VENTRICULAR TACHYCARDIA): ICD-10-CM

## 2022-03-07 PROBLEM — I20.9 ANGINA, CLASS III (HCC): Status: RESOLVED | Noted: 2019-09-23 | Resolved: 2022-03-07

## 2022-03-07 PROCEDURE — 93010 ELECTROCARDIOGRAM REPORT: CPT | Performed by: INTERNAL MEDICINE

## 2022-03-07 PROCEDURE — G0463 HOSPITAL OUTPT CLINIC VISIT: HCPCS | Performed by: INTERNAL MEDICINE

## 2022-03-07 PROCEDURE — 1101F PT FALLS ASSESS-DOCD LE1/YR: CPT | Performed by: INTERNAL MEDICINE

## 2022-03-07 PROCEDURE — G8420 CALC BMI NORM PARAMETERS: HCPCS | Performed by: INTERNAL MEDICINE

## 2022-03-07 PROCEDURE — 99214 OFFICE O/P EST MOD 30 MIN: CPT | Performed by: INTERNAL MEDICINE

## 2022-03-07 PROCEDURE — 93005 ELECTROCARDIOGRAM TRACING: CPT | Performed by: INTERNAL MEDICINE

## 2022-03-07 PROCEDURE — G8536 NO DOC ELDER MAL SCRN: HCPCS | Performed by: INTERNAL MEDICINE

## 2022-03-07 PROCEDURE — G8427 DOCREV CUR MEDS BY ELIG CLIN: HCPCS | Performed by: INTERNAL MEDICINE

## 2022-03-07 PROCEDURE — G8432 DEP SCR NOT DOC, RNG: HCPCS | Performed by: INTERNAL MEDICINE

## 2022-03-07 NOTE — PROGRESS NOTES
Chief Complaint   Patient presents with    Cholesterol Problem     Follow up since Oct 2020-SOB on exertion.  Hypertension    Heart Problem     1. Have you been to the ER, urgent care clinic since your last visit? Hospitalized since your last visit? No    2. Have you seen or consulted any other health care providers outside of the [address] [place] [address] since your last visit? Include any pap smears or colon screening.  No

## 2022-03-07 NOTE — PROGRESS NOTES
Alvester Breath, FNP-BC    Subjective/HPI:     Steven Anderson is a 80 y.o. male is here for routine f/u. He has a PMHx of CAD, SSS s/p PPM, HTN, HLD and DM2. Not seen since 1/2020. Feels well today. Denies new complaints of chest pains, dizziness, orthopnea, PND or edema. Denies palpitation symptoms, shortness of breath on exertion. Taking all medications, hasn't missed any doses. Current Outpatient Medications on File Prior to Visit   Medication Sig Dispense Refill    atorvastatin (Lipitor) 40 mg tablet Take 1 Tablet by mouth daily. 90 Tablet 1    benazepriL (LOTENSIN) 5 mg tablet Take 1 Tablet by mouth daily. 30 Tablet 3    chlorthalidone (HYGROTON) 25 mg tablet Take 0.5 Tablets by mouth daily. 45 Tablet 1    cholecalciferol (VITAMIN D3) (5000 Units /125 mcg) capsule Take 1 Capsule by mouth daily. 90 Capsule 1    clopidogreL (PLAVIX) 75 mg tab Take 1 Tablet by mouth daily. 90 Tablet 1    donepeziL (ARICEPT) 5 mg tablet Take 1 Tablet by mouth nightly. 90 Tablet 1    levothyroxine (SYNTHROID) 100 mcg tablet Take 1 Tablet by mouth Daily (before breakfast). 90 Tablet 1    metoprolol succinate (TOPROL-XL) 50 mg XL tablet Take 1 Tablet by mouth daily. 90 Tablet 1    glimepiride (AMARYL) 1 mg tablet Take 1 tab a day with meal 30 Tablet 2    aspirin delayed-release 81 mg tablet Take 1 Tablet by mouth daily. 90 Tablet 1    empagliflozin (JARDIANCE) 10 mg tablet Take 1 Tablet by mouth daily. (Patient not taking: Reported on 11/5/2021) 30 Tablet 3     No current facility-administered medications on file prior to visit. Review of Symptoms:    Review of Systems   Constitutional: Negative for chills, fever and weight loss. HENT: Negative for nosebleeds. Eyes: Negative for blurred vision and double vision. Respiratory: Negative for cough, shortness of breath and wheezing. Cardiovascular: Negative for chest pain, palpitations, orthopnea, leg swelling and PND.    Skin: Negative for rash. Neurological: Negative for dizziness and loss of consciousness. Physical Exam:      General: Well developed, in no acute distress, cooperative and alert  Heart:  reg rate and rhythm; normal S1/S2; no murmurs, no gallops or rubs. Respiratory: Clear bilaterally x 4, no wheezing or rales  Extremities:  Normal cap refill, no cyanosis, atraumatic. No edema. Vascular: 2+ pulses symmetric in all extremities    Vitals:    03/07/22 0918   BP: 118/70   BP 1 Location: Left upper arm   BP Patient Position: Sitting   BP Cuff Size: Large adult   Pulse: 87   Resp: 18   Height: 5' 11\" (1.803 m)   Weight: 170 lb 11.2 oz (77.4 kg)   SpO2: 99%       ECG done today shows V-paced rhythm     Assessment:       ICD-10-CM ICD-9-CM    1. Coronary artery disease involving native coronary artery of native heart without angina pectoris  I25.10 414.01    2. Primary hypertension  I10 401.9 AMB POC EKG ROUTINE W/ 12 LEADS, INTER & REP   3. Mixed hyperlipidemia  E78.2 272.2    4. Cardiac pacemaker in situ  Z95.0 V45.01         Plan:     1. Coronary artery disease involving native coronary artery of native heart without angina pectoris  Hx of JOSE to LMT, LAD, LCx and RCA in 9/2019. Was not deemed CT surgery candidate due to co-morbidities, age, frailty and life expectancy. Echo done 9/2019 with preserved LVEF 50-55% with grade 1 DD, mild AS, mild TR/PI  Denies symptoms today. Multiple NSVT episodes, 6 beats, noted on PPM, none since 12/2021. Will evaluate with lexiscan stress test.  Continue ASA, statin, BB. On Jardiance. Continue Plavix; if stress test normal, then can d/c Plavix. 2. Primary hypertension  BP controlled. Continue anti-hypertensive therapy and low sodium diet    3. Mixed hyperlipidemia  LDL 78 in 11/2021  Continue statin therapy and low fat, low cholesterol diet  Lipids managed by PCP    4.  Cardiac pacemaker in situ  Continue with routine device interrogation with Dr. Hermilo Curtis    F/u with  Vishal in 6 months if stress test normal    Liana Brain, NP

## 2022-03-11 ENCOUNTER — OFFICE VISIT (OUTPATIENT)
Dept: FAMILY MEDICINE CLINIC | Age: 87
End: 2022-03-11
Payer: MEDICARE

## 2022-03-11 VITALS
BODY MASS INDEX: 24.22 KG/M2 | RESPIRATION RATE: 20 BRPM | WEIGHT: 173 LBS | DIASTOLIC BLOOD PRESSURE: 67 MMHG | TEMPERATURE: 97.5 F | HEART RATE: 92 BPM | SYSTOLIC BLOOD PRESSURE: 127 MMHG | HEIGHT: 71 IN | OXYGEN SATURATION: 98 %

## 2022-03-11 DIAGNOSIS — E11.9 DIABETIC EYE EXAM (HCC): ICD-10-CM

## 2022-03-11 DIAGNOSIS — I10 HYPERTENSION, WELL CONTROLLED: ICD-10-CM

## 2022-03-11 DIAGNOSIS — Z01.00 DIABETIC EYE EXAM (HCC): ICD-10-CM

## 2022-03-11 DIAGNOSIS — E11.51 TYPE 2 DIABETES MELLITUS WITH PERIPHERAL VASCULAR DISEASE (HCC): Primary | ICD-10-CM

## 2022-03-11 DIAGNOSIS — N18.30 STAGE 3 CHRONIC KIDNEY DISEASE, UNSPECIFIED WHETHER STAGE 3A OR 3B CKD (HCC): ICD-10-CM

## 2022-03-11 DIAGNOSIS — F03.90 DEMENTIA WITHOUT BEHAVIORAL DISTURBANCE, UNSPECIFIED DEMENTIA TYPE: ICD-10-CM

## 2022-03-11 LAB
GLUCOSE POC: 199 MG/DL
HBA1C MFR BLD HPLC: 7.3 %

## 2022-03-11 PROCEDURE — G0463 HOSPITAL OUTPT CLINIC VISIT: HCPCS | Performed by: INTERNAL MEDICINE

## 2022-03-11 PROCEDURE — 99214 OFFICE O/P EST MOD 30 MIN: CPT | Performed by: INTERNAL MEDICINE

## 2022-03-11 PROCEDURE — 83036 HEMOGLOBIN GLYCOSYLATED A1C: CPT | Performed by: INTERNAL MEDICINE

## 2022-03-11 PROCEDURE — G8427 DOCREV CUR MEDS BY ELIG CLIN: HCPCS | Performed by: INTERNAL MEDICINE

## 2022-03-11 PROCEDURE — 3051F HG A1C>EQUAL 7.0%<8.0%: CPT | Performed by: INTERNAL MEDICINE

## 2022-03-11 PROCEDURE — 82962 GLUCOSE BLOOD TEST: CPT | Performed by: INTERNAL MEDICINE

## 2022-03-11 PROCEDURE — G8420 CALC BMI NORM PARAMETERS: HCPCS | Performed by: INTERNAL MEDICINE

## 2022-03-11 PROCEDURE — G8432 DEP SCR NOT DOC, RNG: HCPCS | Performed by: INTERNAL MEDICINE

## 2022-03-11 PROCEDURE — 1101F PT FALLS ASSESS-DOCD LE1/YR: CPT | Performed by: INTERNAL MEDICINE

## 2022-03-11 PROCEDURE — G8536 NO DOC ELDER MAL SCRN: HCPCS | Performed by: INTERNAL MEDICINE

## 2022-03-11 NOTE — PROGRESS NOTES
Chief Complaint   Patient presents with    Follow-up     HPI:  Susie Walden is a 80 y.o.  male with h/o hypertension, hypercholesterolemia, diabetes type 2, CKD, CAD, s/p cardiac cath, bilateral carotid artery stenosis presents accompanied by son for 3 month   follow-up. Blood pressure is at goal. A1C show stable diabetes. Patient has no complaints. Review of Systems  As per hpi    Past Medical History:   Diagnosis Date    CAD (coronary artery disease)     [facility]     CKD (chronic kidney disease) 2019    Diabetes [facility]     Essential hypertension     Hypercholesterolemia     Long term current use of anticoagulant therapy     Pacemaker 2015    MDT    S/P cardiac cath 2019    S/P coronary artery stent placement 2019 PCI/JOSE to distal LMT, ,prox LAD and LCX, distal RCA    Thyroid disease      Past Surgical History:   Procedure Laterality Date    HX CORONARY STENT PLACEMENT      HX HEART CATHETERIZATION      HX PACEMAKER       Social History     Socioeconomic History    Marital status:    Tobacco Use    Smoking status: Former Smoker     Quit date: 1979     Years since quittin.5    Smokeless tobacco: Never Used   Vaping Use    Vaping Use: Never used   Substance and Sexual Activity    Alcohol use: Not Currently    Drug use: Never    Sexual activity: Not Currently     Family History   Problem Relation Age of Onset    Diabetes Mother     Heart Surgery Father     No Known Problems Sister     No Known Problems Brother     No Known Problems Sister     No Known Problems Sister      Current Outpatient Medications   Medication Sig Dispense Refill    atorvastatin (Lipitor) 40 mg tablet Take 1 Tablet by mouth daily. 90 Tablet 1    benazepriL (LOTENSIN) 5 mg tablet Take 1 Tablet by mouth daily. 30 Tablet 3    chlorthalidone (HYGROTON) 25 mg tablet Take 0.5 Tablets by mouth daily.  45 Tablet 1    cholecalciferol (VITAMIN D3) (5000 Units /125 mcg) capsule Take 1 Capsule by mouth daily. 90 Capsule 1    clopidogreL (PLAVIX) 75 mg tab Take 1 Tablet by mouth daily. 90 Tablet 1    donepeziL (ARICEPT) 5 mg tablet Take 1 Tablet by mouth nightly. 90 Tablet 1    levothyroxine (SYNTHROID) 100 mcg tablet Take 1 Tablet by mouth Daily (before breakfast). 90 Tablet 1    metoprolol succinate (TOPROL-XL) 50 mg XL tablet Take 1 Tablet by mouth daily. 90 Tablet 1    glimepiride (AMARYL) 1 mg tablet Take 1 tab a day with meal 30 Tablet 2    empagliflozin (JARDIANCE) 10 mg tablet Take 1 Tablet by mouth daily. 30 Tablet 3    aspirin delayed-release 81 mg tablet Take 1 Tablet by mouth daily. 90 Tablet 1     No Known Allergies    Objective:  Visit Vitals  /67   Pulse 92   Temp 97.5 °F (36.4 °C) (Temporal)   Resp 20   Ht 5' 11\" (1.803 m)   Wt 173 lb (78.5 kg)   SpO2 98%   BMI 24.13 kg/m²     Physical Exam:   General appearance - alert, well appearing in no distress  Mental status - alert, oriented to person, place, and time  ENT-ENT exam normal, no neck nodes or sinus tenderness  Chest - clear to auscultation, no wheezes, rales or rhonchi  Heart - normal rate, regular rhythm, no murmurs    Abdomen - soft, nontender, nondistended, no organomegaly  Ext-peripheral pulses normal, no pedal edema  Neuro - no focal findings     Results for orders placed or performed in visit on 03/11/22   AMB POC HEMOGLOBIN A1C   Result Value Ref Range    Hemoglobin A1c (POC) 7.3 %   AMB POC GLUCOSE BLOOD, BY GLUCOSE MONITORING DEVICE   Result Value Ref Range    Glucose  MG/DL       Assessment/Plan:  Diagnoses and all orders for this visit:    Type 2 diabetes mellitus with peripheral vascular disease (HCC)  -     AMB POC HEMOGLOBIN A1C  -     AMB POC GLUCOSE BLOOD, BY GLUCOSE MONITORING DEVICE  -     METABOLIC PANEL, COMPREHENSIVE;  Future    Dementia without behavioral disturbance, unspecified dementia type (HCC)  -     METABOLIC PANEL, COMPREHENSIVE; Future    Stage 3 chronic kidney disease, unspecified whether stage 3a or 3b CKD (HCC)  -     METABOLIC PANEL, COMPREHENSIVE; Future    Hypertension, well controlled  -     METABOLIC PANEL, COMPREHENSIVE; Future    Diabetic eye exam ([facility]  -     REFERRAL TO OPHTHALMOLOGY      Patient Instructions        Learning About Mild Cognitive Impairment (MCI)  What is mild cognitive impairment (MCI)? It's common to forget things sometimes as we get older. But some older people have memory loss that's more than normal aging but less than dementia. They have what's called mild cognitive impairment, or MCI. People with the condition often know that their memory or mental function has changed. Tests may show some loss. But their minds work well overall. They can carry out daily tasks that are normal for them. People with MCI have a higher chance of one day getting dementia. But not all people who have it will get dementia. Some people may stay the same over time. What are the symptoms? People with MCI have more memory loss than what occurs with normal aging. They may have increasing trouble with recalling words and keeping up with conversations. They may also have trouble remembering important events and making decisions. What puts you at risk? The risk of getting MCI increases with age. Having high blood pressure or having a family history of MCI may also increase your risk. How is it diagnosed? Your doctor will do a physical exam.  You may be asked questions to check your memory and other mental skills. Your doctor may also talk to close friends and family members. This can help the doctor figure out how your memory and other mental skills have changed. You may get blood tests and tests that look at your brain. These questions and tests can make sure you don't have other conditions that can cause symptoms like MCI. These include depression, sleep problems, and side effects from medicines.   How is it treated? There are no medicines to treat MCI or to keep it from progressing to dementia. But treating conditions like high blood pressure and diabetes may help. A person with MCI needs routine follow-up visits with their doctor to check on changes in the person's mental skills. How can you care for yourself at home? Keeping your body active can help slow MCI. Exercises like walking can help. Try to stay active mentally too. Read or do things like crossword puzzles if you enjoy doing them. It's common to feel scared, anxious, or depressed. Let yourself grieve if you need to. You may want to get emotional support from family, friends, a support group, or a counselor who works with people who have [address] Though the future isn't always clear, it can be good to plan ahead with instructions for your care. These are called advanced directives. Having a plan can help make sure that you get the care you want. Current as of: December 13, 2021               Content Version: 13.2  © 2006-2022 [employer], [facility]. Care instructions adapted under license by [employer] (which disclaims liability or warranty for this information). If you have questions about a medical condition or this instruction, always ask your healthcare professional. [address] any warranty or liability for your use of this information. Follow-up and Dispositions    · Return in about 4 months (around 7/11/2022) for routine follow up.

## 2022-03-11 NOTE — PATIENT INSTRUCTIONS
Learning About Mild Cognitive Impairment (MCI)  What is mild cognitive impairment (MCI)? It's common to forget things sometimes as we get older. But some older people have memory loss that's more than normal aging but less than dementia. They have what's called mild cognitive impairment, or MCI. People with the condition often know that their memory or mental function has changed. Tests may show some loss. But their minds work well overall. They can carry out daily tasks that are normal for them. People with MCI have a higher chance of one day getting dementia. But not all people who have it will get dementia. Some people may stay the same over time. What are the symptoms? People with MCI have more memory loss than what occurs with normal aging. They may have increasing trouble with recalling words and keeping up with conversations. They may also have trouble remembering important events and making decisions. What puts you at risk? The risk of getting MCI increases with age. Having high blood pressure or having a family history of MCI may also increase your risk. How is it diagnosed? Your doctor will do a physical exam.  You may be asked questions to check your memory and other mental skills. Your doctor may also talk to close friends and family members. This can help the doctor figure out how your memory and other mental skills have changed. You may get blood tests and tests that look at your brain. These questions and tests can make sure you don't have other conditions that can cause symptoms like MCI. These include depression, sleep problems, and side effects from medicines. How is it treated? There are no medicines to treat MCI or to keep it from progressing to dementia. But treating conditions like high blood pressure and diabetes may help. A person with MCI needs routine follow-up visits with their doctor to check on changes in the person's mental skills.   How can you care for yourself at home?  Keeping your body active can help slow MCI. Exercises like walking can help. Try to stay active mentally too. Read or do things like crossword puzzles if you enjoy doing them. It's common to feel scared, anxious, or depressed. Let yourself grieve if you need to. You may want to get emotional support from family, friends, a support group, or a counselor who works with people who have [address] Though the future isn't always clear, it can be good to plan ahead with instructions for your care. These are called advanced directives. Having a plan can help make sure that you get the care you want. Current as of: December 13, 2021               Content Version: 13.2  © 2006-2022 [employer]. Care instructions adapted under license by [employer] (which disclaims liability or warranty for this information). If you have questions about a medical condition or this instruction, always ask your healthcare professional. [place] [address] any warranty or liability for your use of this information.

## 2022-03-18 PROBLEM — I10 HYPERTENSION: Status: ACTIVE | Noted: 2019-08-27

## 2022-03-19 PROBLEM — Z95.0 PACEMAKER: Status: ACTIVE | Noted: 2019-08-27

## 2022-03-19 PROBLEM — Z95.5 S/P CORONARY ARTERY STENT PLACEMENT: Status: ACTIVE | Noted: 2019-09-25

## 2022-03-19 PROBLEM — I65.23 BILATERAL CAROTID ARTERY STENOSIS: Status: ACTIVE | Noted: 2019-09-23

## 2022-03-19 PROBLEM — N18.9 CKD (CHRONIC KIDNEY DISEASE): Status: ACTIVE | Noted: 2019-09-24

## 2022-03-19 PROBLEM — E11.51 TYPE 2 DIABETES MELLITUS WITH PERIPHERAL VASCULAR DISEASE (HCC): Status: ACTIVE | Noted: 2020-10-21

## 2022-03-19 PROBLEM — Z98.890 S/P CARDIAC CATH: Status: ACTIVE | Noted: 2019-09-24

## 2022-03-19 PROBLEM — I25.10 CORONARY ARTERY DISEASE INVOLVING NATIVE CORONARY ARTERY OF NATIVE HEART: Status: ACTIVE | Noted: 2019-08-27

## 2022-03-19 PROBLEM — E11.21 TYPE 2 DIABETES WITH NEPHROPATHY (HCC): Status: ACTIVE | Noted: 2020-10-21

## 2022-03-19 PROBLEM — O16.1 ELEVATED BLOOD PRESSURE AFFECTING PREGNANCY IN FIRST TRIMESTER, ANTEPARTUM: Status: ACTIVE | Noted: 2019-09-18

## 2022-03-20 PROBLEM — N18.30 CKD (CHRONIC KIDNEY DISEASE) STAGE 3, GFR 30-59 ML/MIN (HCC): Status: ACTIVE | Noted: 2020-10-21

## 2022-03-20 PROBLEM — E11.9 CONTROLLED TYPE 2 DIABETES MELLITUS, WITHOUT LONG-TERM CURRENT USE OF INSULIN (HCC): Status: ACTIVE | Noted: 2019-08-27

## 2022-03-23 ENCOUNTER — ANCILLARY PROCEDURE (OUTPATIENT)
Dept: CARDIOLOGY CLINIC | Age: 87
End: 2022-03-23
Payer: MEDICARE

## 2022-03-23 VITALS
BODY MASS INDEX: 24.22 KG/M2 | WEIGHT: 173 LBS | DIASTOLIC BLOOD PRESSURE: 80 MMHG | HEIGHT: 71 IN | SYSTOLIC BLOOD PRESSURE: 140 MMHG

## 2022-03-23 DIAGNOSIS — I25.10 CORONARY ARTERY DISEASE INVOLVING NATIVE CORONARY ARTERY OF NATIVE HEART WITHOUT ANGINA PECTORIS: ICD-10-CM

## 2022-03-23 DIAGNOSIS — I47.29 NSVT (NONSUSTAINED VENTRICULAR TACHYCARDIA): ICD-10-CM

## 2022-03-23 LAB
NUC STRESS EJECTION FRACTION: 41 %
STRESS BASELINE DIAS BP: 80 MMHG
STRESS BASELINE HR: 90 BPM
STRESS BASELINE ST DEPRESSION: 0 MM
STRESS BASELINE SYS BP: 140 MMHG
STRESS PEAK DIAS BP: 76 MMHG
STRESS PEAK SYS BP: 144 MMHG
STRESS PERCENT HR ACHIEVED: 82 %
STRESS POST PEAK HR: 109 BPM
STRESS RATE PRESSURE PRODUCT: NORMAL BPM*MMHG
STRESS ST DEPRESSION: 0 MM
STRESS TARGET HR: 133 BPM

## 2022-03-23 PROCEDURE — A9502 TC99M TETROFOSMIN: HCPCS

## 2022-03-23 PROCEDURE — 78452 HT MUSCLE IMAGE SPECT MULT: CPT | Performed by: INTERNAL MEDICINE

## 2022-03-23 PROCEDURE — 93018 CV STRESS TEST I&R ONLY: CPT | Performed by: INTERNAL MEDICINE

## 2022-03-23 PROCEDURE — 93017 CV STRESS TEST TRACING ONLY: CPT | Performed by: INTERNAL MEDICINE

## 2022-03-23 PROCEDURE — 93016 CV STRESS TEST SUPVJ ONLY: CPT | Performed by: INTERNAL MEDICINE

## 2022-03-23 RX ADMIN — REGADENOSON 0.4 MG: 0.08 INJECTION, SOLUTION INTRAVENOUS at 14:05

## 2022-03-23 RX ADMIN — TETROFOSMIN 10.1 MILLICURIE: 1.38 INJECTION, POWDER, LYOPHILIZED, FOR SOLUTION INTRAVENOUS at 13:35

## 2022-03-23 RX ADMIN — TETROFOSMIN 33 MILLICURIE: 1.38 INJECTION, POWDER, LYOPHILIZED, FOR SOLUTION INTRAVENOUS at 14:05

## 2022-03-25 ENCOUNTER — TELEPHONE (OUTPATIENT)
Dept: CARDIOLOGY CLINIC | Age: 87
End: 2022-03-25

## 2022-03-25 NOTE — TELEPHONE ENCOUNTER
----- Message from Aaliyah Velasquez NP sent at 3/25/2022  8:16 AM EDT -----  Please call the patient and inform that stress test is normal.  Low concern for significant blockages in the heart arteries at this time.   Keep fu with us in 6 months    Thanks,  [employer]

## 2022-03-25 NOTE — PROGRESS NOTES
Please call the patient and inform that stress test is normal.  Low concern for significant blockages in the heart arteries at this time.   Keep fu with us in 6 months    Thanks,  [employer]

## 2022-03-28 ENCOUNTER — OFFICE VISIT (OUTPATIENT)
Dept: CARDIOLOGY CLINIC | Age: 87
End: 2022-03-28
Payer: MEDICARE

## 2022-03-28 VITALS
OXYGEN SATURATION: 97 % | HEART RATE: 90 BPM | SYSTOLIC BLOOD PRESSURE: 134 MMHG | DIASTOLIC BLOOD PRESSURE: 80 MMHG | BODY MASS INDEX: 23.86 KG/M2 | RESPIRATION RATE: 18 BRPM | HEIGHT: 71 IN | WEIGHT: 170.44 LBS

## 2022-03-28 DIAGNOSIS — I25.10 CORONARY ARTERY DISEASE INVOLVING NATIVE CORONARY ARTERY OF NATIVE HEART WITHOUT ANGINA PECTORIS: Primary | ICD-10-CM

## 2022-03-28 DIAGNOSIS — I47.29 NSVT (NONSUSTAINED VENTRICULAR TACHYCARDIA): ICD-10-CM

## 2022-03-28 DIAGNOSIS — E78.2 MIXED HYPERLIPIDEMIA: ICD-10-CM

## 2022-03-28 DIAGNOSIS — I10 PRIMARY HYPERTENSION: ICD-10-CM

## 2022-03-28 PROCEDURE — 1101F PT FALLS ASSESS-DOCD LE1/YR: CPT | Performed by: INTERNAL MEDICINE

## 2022-03-28 PROCEDURE — 99214 OFFICE O/P EST MOD 30 MIN: CPT | Performed by: INTERNAL MEDICINE

## 2022-03-28 PROCEDURE — G8536 NO DOC ELDER MAL SCRN: HCPCS | Performed by: INTERNAL MEDICINE

## 2022-03-28 PROCEDURE — G8427 DOCREV CUR MEDS BY ELIG CLIN: HCPCS | Performed by: INTERNAL MEDICINE

## 2022-03-28 PROCEDURE — G8420 CALC BMI NORM PARAMETERS: HCPCS | Performed by: INTERNAL MEDICINE

## 2022-03-28 PROCEDURE — G0463 HOSPITAL OUTPT CLINIC VISIT: HCPCS | Performed by: INTERNAL MEDICINE

## 2022-03-28 PROCEDURE — G8510 SCR DEP NEG, NO PLAN REQD: HCPCS | Performed by: INTERNAL MEDICINE

## 2022-03-28 NOTE — PROGRESS NOTES
Chief Complaint   Patient presents with    Results     Patient presents today to discuss stress test results done on 03/23/2022. Denies cardiac sx. 1. Have you been to the ER, urgent care clinic since your last visit? Hospitalized since your last visit? No    2. Have you seen or consulted any other health care providers outside of the [address] [place] [address] since your last visit?  No

## 2022-03-28 NOTE — LETTER
3/28/2022    Patient: Lc Ram   YOB: 1934   Date of Visit: 3/28/2022     Carlos Reyan MD  [address]  [place]    Dear Carlos Reyna MD,      Thank you for referring Mr. Candida Mauro to [address] [place] [address] for evaluation. My notes for this consultation are attached. If you have questions, please do not hesitate to call me. I look forward to following your patient along with you.       Sincerely,    Tiago Gomes MD

## 2022-03-28 NOTE — PROGRESS NOTES
Subjective/HPI:     Vicky Gaston is a 80 y.o. male is here for routine f/u. He has a PMHx of CAD, SSS s/p PPM, HTN, HLD and DM2. Pacemaker showed episodes of NSVT. Stress test showed small to moderate, partially reversible distal anterior/apical wall defect . PCP Provider  Lencho Hallman MD    Past Medical History:   Diagnosis Date    CAD (coronary artery disease)     [facility]     CKD (chronic kidney disease) 9/24/2019    Diabetes ([facility]     Essential hypertension     Hypercholesterolemia     Long term current use of anticoagulant therapy     Pacemaker 04/2015    MDT    S/P cardiac cath 9/24/2019    S/P coronary artery stent placement 9/25/2019 9/24/19 PCI/JOSE to distal LMT, ,prox LAD and LCX, distal RCA    Thyroid disease         No Known Allergies     Outpatient Encounter Medications as of 3/28/2022   Medication Sig Dispense Refill    atorvastatin (Lipitor) 40 mg tablet Take 1 Tablet by mouth daily. 90 Tablet 1    benazepriL (LOTENSIN) 5 mg tablet Take 1 Tablet by mouth daily. 30 Tablet 3    chlorthalidone (HYGROTON) 25 mg tablet Take 0.5 Tablets by mouth daily. 45 Tablet 1    cholecalciferol (VITAMIN D3) (5000 Units /125 mcg) capsule Take 1 Capsule by mouth daily. 90 Capsule 1    clopidogreL (PLAVIX) 75 mg tab Take 1 Tablet by mouth daily. 90 Tablet 1    donepeziL (ARICEPT) 5 mg tablet Take 1 Tablet by mouth nightly. 90 Tablet 1    levothyroxine (SYNTHROID) 100 mcg tablet Take 1 Tablet by mouth Daily (before breakfast). 90 Tablet 1    metoprolol succinate (TOPROL-XL) 50 mg XL tablet Take 1 Tablet by mouth daily. 90 Tablet 1    empagliflozin (JARDIANCE) 10 mg tablet Take 1 Tablet by mouth daily. 30 Tablet 3    aspirin delayed-release 81 mg tablet Take 1 Tablet by mouth daily.  90 Tablet 1    glimepiride (AMARYL) 1 mg tablet Take 1 tab a day with meal (Patient not taking: Reported on 3/28/2022) 30 Tablet 2     No facility-administered encounter medications on file as of 3/28/2022. Review of Symptoms:    Review of Systems   Constitutional: Negative. Negative for chills and fever. HENT: Negative. Negative for hearing loss. Respiratory: Negative. Negative for cough, hemoptysis and shortness of breath. Cardiovascular: Negative. Negative for chest pain, palpitations, orthopnea, claudication, leg swelling and PND. Gastrointestinal: Negative. Negative for nausea and vomiting. Musculoskeletal: Negative for myalgias. Skin: Negative. Negative for rash. Neurological: Negative. Negative for dizziness, loss of consciousness and headaches. Physical Exam:      General: Well developed, in no acute distress, cooperative and alert  HEENT: No carotid bruits, no JVD, trach is midline. Neck Supple, PEERL, EOM intact. Heart:  reg rate and rhythm; normal S1/S2; no murmurs, no gallops or rubs. Respiratory: Clear bilaterally x 4, no wheezing or rales  Abdomen:   Soft, non-tender, no distention, no masses. + BS. Extremities:  Normal cap refill, no cyanosis, atraumatic. No edema. Neuro: A&Ox3, speech clear, gait stable. Skin: Skin color is normal. No rashes or lesions.  Non diaphoretic  Vascular: 2+ pulses symmetric in all extremities    Visit Vitals  /80 (BP 1 Location: Left arm, BP Patient Position: Sitting, BP Cuff Size: Large adult)   Pulse 90   Resp 18   Ht 5' 11\" (1.803 m)   Wt 170 lb 7 oz (77.3 kg)   SpO2 97%   BMI 23.77 kg/m²         Cardiology Labs:    Lab Results   Component Value Date/Time    Cholesterol, total 151 11/05/2021 03:50 PM    HDL Cholesterol 48 11/05/2021 03:50 PM    LDL, calculated 78.6 11/05/2021 03:50 PM    LDL, calculated 43 10/21/2020 01:05 PM    LDL, calculated 40 02/11/2020 11:25 AM    LDL, calculated 23 10/11/2019 01:12 PM    VLDL, calculated 24.4 11/05/2021 03:50 PM    CHOL/HDL Ratio 3.1 11/05/2021 03:50 PM       Lab Results   Component Value Date/Time    Hemoglobin A1c 6.8 (H) 10/21/2020 01:05 PM    Hemoglobin A1c (POC) 7.3 03/11/2022 02:57 PM       Lab Results   Component Value Date/Time    Sodium 132 (L) 11/05/2021 03:50 PM    Potassium 4.1 11/05/2021 03:50 PM    Chloride 98 11/05/2021 03:50 PM    CO2 21 11/05/2021 03:50 PM    Glucose 136 (H) 11/05/2021 03:50 PM    BUN 42 (H) 11/05/2021 03:50 PM    Creatinine 2.25 (H) 11/05/2021 03:50 PM    BUN/Creatinine ratio 19 11/05/2021 03:50 PM    GFR est AA 34 (L) 11/05/2021 03:50 PM    GFR est non-AA 28 (L) 11/05/2021 03:50 PM    Calcium 8.9 11/05/2021 03:50 PM    Anion gap 13 11/05/2021 03:50 PM    Bilirubin, total 0.9 11/05/2021 03:50 PM    ALT (SGPT) 32 11/05/2021 03:50 PM    Alk. phosphatase 102 11/05/2021 03:50 PM    Protein, total 7.6 11/05/2021 03:50 PM    Albumin 4.2 11/05/2021 03:50 PM    Globulin 3.4 11/05/2021 03:50 PM    A-G Ratio 1.2 11/05/2021 03:50 PM          Assessment:       ICD-10-CM ICD-9-CM    1. Coronary artery disease involving native coronary artery of native heart without angina pectoris  I25.10 414.01    2. NSVT (nonsustained ventricular tachycardia) (HCC)  I47.2 427.1    3. Primary hypertension  I10 401.9    4. Mixed hyperlipidemia  E78.2 272.2         Plan:     1. Coronary artery disease involving native coronary artery of native heart without angina pectoris  Stable. Asymptomatic. Abnormal stress test in the presence of NSVT. Continue betablocker. Has multiple stents. Discussed cath/PCI. Patient declined. 2. NSVT (nonsustained ventricular tachycardia) (HCC)  Continue betablocker. Cath/PCI if symptoms worse. 3. Primary hypertension  Well controlled. 4. Mixed hyperlipidemia  Continue statin    F/u in 6 months or sooner if necessary.       Edith Goyal MD

## 2022-03-29 NOTE — TELEPHONE ENCOUNTER
Attempted to contact patient. Unable to leave a VM as mailbox is full. Postcard mailed to patient address on file.

## 2022-04-08 ENCOUNTER — TELEPHONE (OUTPATIENT)
Dept: CARDIOLOGY CLINIC | Age: 87
End: 2022-04-08

## 2022-04-08 NOTE — TELEPHONE ENCOUNTER
Verified Patient with two identifiers  Spoke with patient regarding Stress test results and recommendations. Patient voiced understanding.

## 2022-05-17 DIAGNOSIS — E11.21 TYPE 2 DIABETES WITH NEPHROPATHY (HCC): ICD-10-CM

## 2022-05-17 DIAGNOSIS — E11.51 TYPE 2 DIABETES MELLITUS WITH PERIPHERAL VASCULAR DISEASE (HCC): ICD-10-CM

## 2022-05-17 RX ORDER — GLIMEPIRIDE 1 MG/1
TABLET ORAL
Qty: 30 TABLET | Refills: 2 | Status: SHIPPED | OUTPATIENT
Start: 2022-05-17 | End: 2022-07-28

## 2022-06-11 NOTE — PROGRESS NOTES
1. Have you been to the ER, urgent care clinic since your last visit? Hospitalized since your last visit? [facility]    2. Have you seen or consulted any other health care providers outside of the [address] [place] [address] since your last visit? Include any pap smears or colon screening.  Mikey Corrigan, No

## 2022-07-08 DIAGNOSIS — I10 HYPERTENSION, WELL CONTROLLED: ICD-10-CM

## 2022-07-08 RX ORDER — BENAZEPRIL HYDROCHLORIDE 5 MG/1
TABLET ORAL
Qty: 30 TABLET | Refills: 3 | Status: SHIPPED | OUTPATIENT
Start: 2022-07-08

## 2022-07-27 DIAGNOSIS — Z95.5 S/P CORONARY ARTERY STENT PLACEMENT: ICD-10-CM

## 2022-07-27 DIAGNOSIS — Z95.0 PACEMAKER: ICD-10-CM

## 2022-07-27 RX ORDER — CLOPIDOGREL BISULFATE 75 MG/1
TABLET ORAL
Qty: 90 TABLET | Refills: 1 | Status: SHIPPED | OUTPATIENT
Start: 2022-07-27

## 2022-07-28 DIAGNOSIS — E11.51 TYPE 2 DIABETES MELLITUS WITH PERIPHERAL VASCULAR DISEASE (HCC): ICD-10-CM

## 2022-07-28 DIAGNOSIS — E11.21 TYPE 2 DIABETES WITH NEPHROPATHY (HCC): ICD-10-CM

## 2022-07-28 RX ORDER — GLIMEPIRIDE 1 MG/1
TABLET ORAL
Qty: 90 TABLET | Refills: 1 | Status: SHIPPED | OUTPATIENT
Start: 2022-07-28

## 2022-08-12 DIAGNOSIS — E03.9 ACQUIRED HYPOTHYROIDISM: ICD-10-CM

## 2022-08-12 DIAGNOSIS — E78.00 HYPERCHOLESTEROLEMIA: ICD-10-CM

## 2022-08-12 DIAGNOSIS — E11.21 TYPE 2 DIABETES WITH NEPHROPATHY (HCC): ICD-10-CM

## 2022-08-12 DIAGNOSIS — E11.51 TYPE 2 DIABETES MELLITUS WITH PERIPHERAL VASCULAR DISEASE (HCC): ICD-10-CM

## 2022-08-12 DIAGNOSIS — Z95.5 S/P CORONARY ARTERY STENT PLACEMENT: ICD-10-CM

## 2022-08-12 DIAGNOSIS — F03.90 DEMENTIA WITHOUT BEHAVIORAL DISTURBANCE, UNSPECIFIED DEMENTIA TYPE: ICD-10-CM

## 2022-08-12 RX ORDER — LEVOTHYROXINE SODIUM 100 UG/1
TABLET ORAL
Qty: 90 TABLET | Refills: 1 | Status: SHIPPED | OUTPATIENT
Start: 2022-08-12

## 2022-08-12 RX ORDER — ATORVASTATIN CALCIUM 40 MG/1
TABLET, FILM COATED ORAL
Qty: 90 TABLET | Refills: 1 | Status: SHIPPED | OUTPATIENT
Start: 2022-08-12

## 2022-08-12 RX ORDER — DONEPEZIL HYDROCHLORIDE 5 MG/1
TABLET, FILM COATED ORAL
Qty: 90 TABLET | Refills: 1 | Status: SHIPPED | OUTPATIENT
Start: 2022-08-12

## 2022-12-03 DIAGNOSIS — I10 HYPERTENSION, WELL CONTROLLED: ICD-10-CM

## 2022-12-03 DIAGNOSIS — E55.9 VITAMIN D DEFICIENCY: ICD-10-CM

## 2022-12-05 RX ORDER — METOPROLOL SUCCINATE 50 MG/1
TABLET, EXTENDED RELEASE ORAL
Qty: 90 TABLET | Refills: 1 | Status: SHIPPED | OUTPATIENT
Start: 2022-12-05

## 2022-12-05 RX ORDER — CHOLECALCIFEROL (VITAMIN D3) 125 MCG
CAPSULE ORAL
Qty: 90 CAPSULE | Refills: 1 | Status: SHIPPED | OUTPATIENT
Start: 2022-12-05

## 2022-12-05 RX ORDER — CHLORTHALIDONE 25 MG/1
TABLET ORAL
Qty: 45 TABLET | Refills: 1 | Status: SHIPPED | OUTPATIENT
Start: 2022-12-05

## 2022-12-10 DIAGNOSIS — I10 HYPERTENSION, WELL CONTROLLED: ICD-10-CM

## 2022-12-12 RX ORDER — BENAZEPRIL HYDROCHLORIDE 5 MG/1
TABLET ORAL
Qty: 90 TABLET | Refills: 1 | Status: SHIPPED | OUTPATIENT
Start: 2022-12-12

## 2023-04-23 DIAGNOSIS — E11.21 TYPE 2 DIABETES WITH NEPHROPATHY (HCC): ICD-10-CM

## 2023-04-23 DIAGNOSIS — Z95.0 PACEMAKER: ICD-10-CM

## 2023-04-23 DIAGNOSIS — E03.9 ACQUIRED HYPOTHYROIDISM: ICD-10-CM

## 2023-04-23 DIAGNOSIS — Z95.5 S/P CORONARY ARTERY STENT PLACEMENT: ICD-10-CM

## 2023-04-23 DIAGNOSIS — E78.00 HYPERCHOLESTEROLEMIA: ICD-10-CM

## 2023-04-23 DIAGNOSIS — E11.51 TYPE 2 DIABETES MELLITUS WITH PERIPHERAL VASCULAR DISEASE (HCC): ICD-10-CM

## 2023-04-23 DIAGNOSIS — F03.90 DEMENTIA WITHOUT BEHAVIORAL DISTURBANCE (HCC): ICD-10-CM

## 2023-04-25 RX ORDER — CLOPIDOGREL BISULFATE 75 MG/1
TABLET ORAL
Qty: 90 TABLET | Refills: 1 | Status: SHIPPED | OUTPATIENT
Start: 2023-04-25

## 2023-04-25 RX ORDER — LEVOTHYROXINE SODIUM 100 UG/1
TABLET ORAL
Qty: 90 TABLET | Refills: 1 | Status: SHIPPED | OUTPATIENT
Start: 2023-04-25

## 2023-04-25 RX ORDER — GLIMEPIRIDE 1 MG/1
TABLET ORAL
Qty: 90 TABLET | Refills: 1 | Status: SHIPPED | OUTPATIENT
Start: 2023-04-25

## 2023-04-25 RX ORDER — ATORVASTATIN CALCIUM 40 MG/1
TABLET, FILM COATED ORAL
Qty: 90 TABLET | Refills: 1 | Status: SHIPPED | OUTPATIENT
Start: 2023-04-25

## 2023-04-25 RX ORDER — DONEPEZIL HYDROCHLORIDE 5 MG/1
TABLET, FILM COATED ORAL
Qty: 90 TABLET | Refills: 1 | Status: SHIPPED | OUTPATIENT
Start: 2023-04-25

## 2023-05-17 RX ORDER — DONEPEZIL HYDROCHLORIDE 5 MG/1
1 TABLET, FILM COATED ORAL NIGHTLY
COMMUNITY
Start: 2023-04-25 | End: 2023-06-01 | Stop reason: SDUPTHER

## 2023-05-17 RX ORDER — BENAZEPRIL HYDROCHLORIDE 5 MG/1
1 TABLET, FILM COATED ORAL DAILY
COMMUNITY
Start: 2022-12-12 | End: 2023-06-01 | Stop reason: SDUPTHER

## 2023-05-17 RX ORDER — GLIMEPIRIDE 1 MG/1
TABLET ORAL
COMMUNITY
Start: 2023-04-25 | End: 2023-06-01 | Stop reason: SDUPTHER

## 2023-05-17 RX ORDER — ASPIRIN 81 MG/1
81 TABLET ORAL DAILY
COMMUNITY
Start: 2021-07-27

## 2023-05-17 RX ORDER — ATORVASTATIN CALCIUM 40 MG/1
1 TABLET, FILM COATED ORAL DAILY
COMMUNITY
Start: 2023-04-25 | End: 2023-06-01 | Stop reason: SDUPTHER

## 2023-05-17 RX ORDER — LEVOTHYROXINE SODIUM 0.1 MG/1
TABLET ORAL
COMMUNITY
Start: 2023-04-25 | End: 2023-06-01 | Stop reason: SDUPTHER

## 2023-05-17 RX ORDER — CLOPIDOGREL BISULFATE 75 MG/1
1 TABLET ORAL DAILY
COMMUNITY
Start: 2023-04-25 | End: 2023-06-01 | Stop reason: SDUPTHER

## 2023-05-17 RX ORDER — METOPROLOL SUCCINATE 50 MG/1
1 TABLET, EXTENDED RELEASE ORAL DAILY
COMMUNITY
Start: 2022-12-05 | End: 2023-06-01 | Stop reason: SDUPTHER

## 2023-05-17 RX ORDER — CHLORTHALIDONE 25 MG/1
0.5 TABLET ORAL DAILY
COMMUNITY
Start: 2022-12-05 | End: 2023-06-01 | Stop reason: SDUPTHER

## 2023-06-01 ENCOUNTER — OFFICE VISIT (OUTPATIENT)
Age: 88
End: 2023-06-01
Payer: MEDICARE

## 2023-06-01 VITALS
SYSTOLIC BLOOD PRESSURE: 120 MMHG | HEIGHT: 71 IN | RESPIRATION RATE: 18 BRPM | DIASTOLIC BLOOD PRESSURE: 60 MMHG | WEIGHT: 170 LBS | TEMPERATURE: 97.2 F | BODY MASS INDEX: 23.8 KG/M2 | OXYGEN SATURATION: 98 % | HEART RATE: 74 BPM

## 2023-06-01 DIAGNOSIS — I20.9 ANGINA PECTORIS, UNSPECIFIED (HCC): ICD-10-CM

## 2023-06-01 DIAGNOSIS — N40.0 BPH WITHOUT OBSTRUCTION/LOWER URINARY TRACT SYMPTOMS: ICD-10-CM

## 2023-06-01 DIAGNOSIS — E11.21 TYPE 2 DIABETES WITH NEPHROPATHY (HCC): ICD-10-CM

## 2023-06-01 DIAGNOSIS — E55.9 VITAMIN D DEFICIENCY, UNSPECIFIED: ICD-10-CM

## 2023-06-01 DIAGNOSIS — E11.51 TYPE 2 DIABETES MELLITUS WITH PERIPHERAL VASCULAR DISEASE (HCC): ICD-10-CM

## 2023-06-01 DIAGNOSIS — I10 HYPERTENSION, WELL CONTROLLED: ICD-10-CM

## 2023-06-01 DIAGNOSIS — R35.0 FREQUENCY OF URINATION: ICD-10-CM

## 2023-06-01 DIAGNOSIS — I44.2 COMPLETE ATRIOVENTRICULAR BLOCK (HCC): ICD-10-CM

## 2023-06-01 DIAGNOSIS — I50.22 CHRONIC SYSTOLIC (CONGESTIVE) HEART FAILURE (HCC): ICD-10-CM

## 2023-06-01 DIAGNOSIS — G30.8 ALZHEIMER'S DEMENTIA OF OTHER ONSET, WITHOUT BEHAVIORAL DISTURBANCE, PSYCHOTIC DISTURBANCE, MOOD DISTURBANCE, OR ANXIETY, UNSPECIFIED DEMENTIA SEVERITY (HCC): ICD-10-CM

## 2023-06-01 DIAGNOSIS — Z00.00 MEDICARE ANNUAL WELLNESS VISIT, SUBSEQUENT: Primary | ICD-10-CM

## 2023-06-01 DIAGNOSIS — E03.9 HYPOTHYROIDISM, UNSPECIFIED TYPE: ICD-10-CM

## 2023-06-01 DIAGNOSIS — E78.5 HYPERLIPIDEMIA, UNSPECIFIED HYPERLIPIDEMIA TYPE: ICD-10-CM

## 2023-06-01 DIAGNOSIS — F02.80 ALZHEIMER'S DEMENTIA OF OTHER ONSET, WITHOUT BEHAVIORAL DISTURBANCE, PSYCHOTIC DISTURBANCE, MOOD DISTURBANCE, OR ANXIETY, UNSPECIFIED DEMENTIA SEVERITY (HCC): ICD-10-CM

## 2023-06-01 DIAGNOSIS — E55.9 VITAMIN D DEFICIENCY: ICD-10-CM

## 2023-06-01 DIAGNOSIS — Z13.220 SCREENING FOR HYPERLIPIDEMIA: ICD-10-CM

## 2023-06-01 PROBLEM — I49.5 SICK SINUS SYNDROME (HCC): Status: ACTIVE | Noted: 2023-03-09

## 2023-06-01 LAB
GLUCOSE, POC: 130 MG/DL
HBA1C MFR BLD: 6.3 %

## 2023-06-01 PROCEDURE — 1123F ACP DISCUSS/DSCN MKR DOCD: CPT | Performed by: INTERNAL MEDICINE

## 2023-06-01 PROCEDURE — 82962 GLUCOSE BLOOD TEST: CPT | Performed by: INTERNAL MEDICINE

## 2023-06-01 PROCEDURE — PBSHW AMB POC HEMOGLOBIN A1C: Performed by: INTERNAL MEDICINE

## 2023-06-01 PROCEDURE — G0439 PPPS, SUBSEQ VISIT: HCPCS | Performed by: INTERNAL MEDICINE

## 2023-06-01 PROCEDURE — PBSHW AMB POC GLUCOSE BLOOD, BY GLUCOSE MONITORING DEVICE: Performed by: INTERNAL MEDICINE

## 2023-06-01 PROCEDURE — 83036 HEMOGLOBIN GLYCOSYLATED A1C: CPT | Performed by: INTERNAL MEDICINE

## 2023-06-01 RX ORDER — BENAZEPRIL HYDROCHLORIDE 5 MG/1
5 TABLET, FILM COATED ORAL DAILY
Qty: 30 TABLET | Refills: 3 | Status: SHIPPED | OUTPATIENT
Start: 2023-06-01

## 2023-06-01 RX ORDER — ATORVASTATIN CALCIUM 40 MG/1
40 TABLET, FILM COATED ORAL DAILY
Qty: 30 TABLET | Refills: 3 | Status: SHIPPED | OUTPATIENT
Start: 2023-06-01

## 2023-06-01 RX ORDER — CLOPIDOGREL BISULFATE 75 MG/1
75 TABLET ORAL DAILY
Qty: 30 TABLET | Refills: 4 | Status: SHIPPED | OUTPATIENT
Start: 2023-06-01

## 2023-06-01 RX ORDER — GLIMEPIRIDE 1 MG/1
TABLET ORAL
Qty: 30 TABLET | Refills: 4 | Status: SHIPPED | OUTPATIENT
Start: 2023-06-01

## 2023-06-01 RX ORDER — DONEPEZIL HYDROCHLORIDE 5 MG/1
5 TABLET, FILM COATED ORAL NIGHTLY
Qty: 30 TABLET | Refills: 4 | Status: SHIPPED | OUTPATIENT
Start: 2023-06-01

## 2023-06-01 RX ORDER — METOPROLOL SUCCINATE 50 MG/1
50 TABLET, EXTENDED RELEASE ORAL DAILY
Qty: 30 TABLET | Refills: 4 | Status: SHIPPED | OUTPATIENT
Start: 2023-06-01

## 2023-06-01 RX ORDER — LEVOTHYROXINE SODIUM 0.1 MG/1
TABLET ORAL
Qty: 30 TABLET | Refills: 4 | Status: SHIPPED | OUTPATIENT
Start: 2023-06-01

## 2023-06-01 RX ORDER — CHLORTHALIDONE 25 MG/1
12.5 TABLET ORAL DAILY
Qty: 30 TABLET | Refills: 3 | Status: SHIPPED | OUTPATIENT
Start: 2023-06-01

## 2023-06-01 SDOH — ECONOMIC STABILITY: FOOD INSECURITY: WITHIN THE PAST 12 MONTHS, THE FOOD YOU BOUGHT JUST DIDN'T LAST AND YOU DIDN'T HAVE MONEY TO GET MORE.: NEVER TRUE

## 2023-06-01 SDOH — ECONOMIC STABILITY: FOOD INSECURITY: WITHIN THE PAST 12 MONTHS, YOU WORRIED THAT YOUR FOOD WOULD RUN OUT BEFORE YOU GOT MONEY TO BUY MORE.: NEVER TRUE

## 2023-06-01 SDOH — ECONOMIC STABILITY: TRANSPORTATION INSECURITY
IN THE PAST 12 MONTHS, HAS THE LACK OF TRANSPORTATION KEPT YOU FROM MEDICAL APPOINTMENTS OR FROM GETTING MEDICATIONS?: NO

## 2023-06-01 SDOH — ECONOMIC STABILITY: HOUSING INSECURITY
IN THE LAST 12 MONTHS, WAS THERE A TIME WHEN YOU DID NOT HAVE A STEADY PLACE TO SLEEP OR SLEPT IN A SHELTER (INCLUDING NOW)?: NO

## 2023-06-01 SDOH — ECONOMIC STABILITY: HOUSING INSECURITY: IN THE LAST 12 MONTHS, HOW MANY PLACES HAVE YOU LIVED?: 0

## 2023-06-01 SDOH — ECONOMIC STABILITY: TRANSPORTATION INSECURITY
IN THE PAST 12 MONTHS, HAS LACK OF TRANSPORTATION KEPT YOU FROM MEETINGS, WORK, OR FROM GETTING THINGS NEEDED FOR DAILY LIVING?: NO

## 2023-06-01 SDOH — ECONOMIC STABILITY: INCOME INSECURITY: IN THE LAST 12 MONTHS, WAS THERE A TIME WHEN YOU WERE NOT ABLE TO PAY THE MORTGAGE OR RENT ON TIME?: NO

## 2023-06-01 ASSESSMENT — PATIENT HEALTH QUESTIONNAIRE - PHQ9
SUM OF ALL RESPONSES TO PHQ9 QUESTIONS 1 & 2: 0
SUM OF ALL RESPONSES TO PHQ QUESTIONS 1-9: 0
1. LITTLE INTEREST OR PLEASURE IN DOING THINGS: 0
2. FEELING DOWN, DEPRESSED OR HOPELESS: 0
SUM OF ALL RESPONSES TO PHQ QUESTIONS 1-9: 0

## 2023-06-01 ASSESSMENT — SOCIAL DETERMINANTS OF HEALTH (SDOH)
HOW HARD IS IT FOR YOU TO PAY FOR THE VERY BASICS LIKE FOOD, HOUSING, MEDICAL CARE, AND HEATING?: NOT HARD AT ALL
WITHIN THE LAST YEAR, HAVE TO BEEN RAPED OR FORCED TO HAVE ANY KIND OF SEXUAL ACTIVITY BY YOUR PARTNER OR EX-PARTNER?: NO
WITHIN THE LAST YEAR, HAVE YOU BEEN HUMILIATED OR EMOTIONALLY ABUSED IN OTHER WAYS BY YOUR PARTNER OR EX-PARTNER?: NO
WITHIN THE LAST YEAR, HAVE YOU BEEN AFRAID OF YOUR PARTNER OR EX-PARTNER?: NO
WITHIN THE LAST YEAR, HAVE YOU BEEN KICKED, HIT, SLAPPED, OR OTHERWISE PHYSICALLY HURT BY YOUR PARTNER OR EX-PARTNER?: NO

## 2023-06-01 ASSESSMENT — LIFESTYLE VARIABLES
HOW OFTEN DO YOU HAVE A DRINK CONTAINING ALCOHOL: NEVER
HOW MANY STANDARD DRINKS CONTAINING ALCOHOL DO YOU HAVE ON A TYPICAL DAY: PATIENT DOES NOT DRINK

## 2023-06-01 NOTE — PROGRESS NOTES
Medicare Annual Wellness Visit    Marylu Eid is here for Medicare AWV    Assessment & Plan   Medicare annual wellness visit, subsequent  Hypertension, well controlled  -     metoprolol succinate (TOPROL XL) 50 MG extended release tablet; Take 1 tablet by mouth daily, Disp-30 tablet, R-4Normal  -     chlorthalidone (HYGROTON) 25 MG tablet; Take 0.5 tablets by mouth daily, Disp-30 tablet, R-3Normal  -     benazepril (LOTENSIN) 5 MG tablet; Take 1 tablet by mouth daily, Disp-30 tablet, R-3Normal  -     Comprehensive Metabolic Panel; Future  -     CBC with Auto Differential; Future  Type 2 diabetes mellitus with peripheral vascular disease ([facility] [address]  -     Comprehensive Metabolic Panel; Future  -     CBC with Auto Differential; Future  -     Microalbumin / Creatinine Urine Ratio; Future  -     AMB POC HEMOGLOBIN A1C  -     AMB POC GLUCOSE BLOOD, BY GLUCOSE MONITORING DEVICE  Complete atrioventricular block (HCC)  -     clopidogrel (PLAVIX) 75 MG tablet; Take 1 tablet by mouth daily, Disp-30 tablet, R-4Normal  -     Comprehensive Metabolic Panel; Future  -     CBC with Auto Differential; Future  Type 2 diabetes with nephropathy (HCC)  -     glimepiride (AMARYL) 1 MG tablet; TAKE ONE TABLET BY MOUTH DAILY WITH MEAL, Disp-30 tablet, R-4Normal  -     empagliflozin (JARDIANCE) 10 MG tablet; Take 1 tablet by mouth daily, Disp-30 tablet, R-4Normal  -     Comprehensive Metabolic Panel; Future  -     CBC with Auto Differential; Future  -     Microalbumin / Creatinine Urine Ratio; Future  -     AMB POC HEMOGLOBIN A1C  -     AMB POC GLUCOSE BLOOD, BY GLUCOSE MONITORING DEVICE  Vitamin D deficiency, unspecified  -     Vitamin D 25 Hydroxy; Future  Hypothyroidism, unspecified type  -     levothyroxine (SYNTHROID) 100 MCG tablet; TAKE ONE TABLET BY MOUTH DAILY BEFORE BREAKFAST, Disp-30 tablet, R-4Normal  -     TSH;  Future  Vitamin D deficiency  -     vitamin D (CHOLECALCIFEROL) 125 MCG (5000 UT) CAPS capsule; ONE BY MOUTH DAILY,

## 2023-06-01 NOTE — PATIENT INSTRUCTIONS
low-dose aspirin. Wait for an ambulance. Do not try to drive yourself. Watch closely for changes in your health, and be sure to contact your doctor if you have any problems. Where can you learn more? Go to http://www.morin.com/ and enter F075 to learn more about \"A Healthy Heart: Care Instructions. \"  Current as of: September 7, 2022               Content Version: 13.6  © 1303-9745 [employer]. Care instructions adapted under license by [facility] ([facility]). If you have questions about a medical condition or this instruction, always ask your healthcare professional. [place] [address] any warranty or liability for your use of this information. Personalized Preventive Plan for Korina Scale - 6/1/2023  Medicare offers a range of preventive health benefits. Some of the tests and screenings are paid in full while other may be subject to a deductible, co-insurance, and/or copay. Some of these benefits include a comprehensive review of your medical history including lifestyle, illnesses that may run in your family, and various assessments and screenings as appropriate. After reviewing your medical record and screening and assessments performed today your provider may have ordered immunizations, labs, imaging, and/or referrals for you. A list of these orders (if applicable) as well as your Preventive Care list are included within your After Visit Summary for your review. Other Preventive Recommendations:    A preventive eye exam performed by an eye specialist is recommended every 1-2 years to screen for glaucoma; cataracts, macular degeneration, and other eye disorders. A preventive dental visit is recommended every 6 months. Try to get at least 150 minutes of exercise per week or 10,000 steps per day on a pedometer . Order or download the FREE \"Exercise & Physical Activity: Your Everyday Guide\" from [employer].  Call 8-821.766.6723 or

## 2023-06-02 LAB
25(OH)D3 SERPL-MCNC: 60.3 NG/ML (ref 30–100)
ALBUMIN SERPL-MCNC: 3.8 G/DL (ref 3.5–5)
ALBUMIN/GLOB SERPL: 1.1 (ref 1.1–2.2)
ALP SERPL-CCNC: 93 U/L (ref 45–117)
ALT SERPL-CCNC: 31 U/L (ref 12–78)
ANION GAP SERPL CALC-SCNC: 9 MMOL/L (ref 5–15)
APPEARANCE UR: ABNORMAL
AST SERPL-CCNC: 24 U/L (ref 15–37)
BACTERIA URNS QL MICRO: ABNORMAL /HPF
BASOPHILS # BLD: 0.1 K/UL (ref 0–0.1)
BASOPHILS NFR BLD: 1 % (ref 0–1)
BILIRUB SERPL-MCNC: 0.6 MG/DL (ref 0.2–1)
BILIRUB UR QL: NEGATIVE
BUN SERPL-MCNC: 39 MG/DL (ref 6–20)
BUN/CREAT SERPL: 20 (ref 12–20)
CALCIUM SERPL-MCNC: 8.9 MG/DL (ref 8.5–10.1)
CHLORIDE SERPL-SCNC: 99 MMOL/L (ref 97–108)
CHOLEST SERPL-MCNC: 104 MG/DL
CO2 SERPL-SCNC: 27 MMOL/L (ref 21–32)
COLOR UR: ABNORMAL
CREAT SERPL-MCNC: 1.95 MG/DL (ref 0.7–1.3)
CREAT UR-MCNC: 124 MG/DL
DIFFERENTIAL METHOD BLD: ABNORMAL
EOSINOPHIL # BLD: 0.4 K/UL (ref 0–0.4)
EOSINOPHIL NFR BLD: 3 % (ref 0–7)
EPITH CASTS URNS QL MICRO: ABNORMAL /LPF
ERYTHROCYTE [DISTWIDTH] IN BLOOD BY AUTOMATED COUNT: 14 % (ref 11.5–14.5)
GLOBULIN SER CALC-MCNC: 3.4 G/DL (ref 2–4)
GLUCOSE SERPL-MCNC: 147 MG/DL (ref 65–100)
GLUCOSE UR STRIP.AUTO-MCNC: NEGATIVE MG/DL
HCT VFR BLD AUTO: 43 % (ref 36.6–50.3)
HDLC SERPL-MCNC: 43 MG/DL
HDLC SERPL: 2.4 (ref 0–5)
HGB BLD-MCNC: 13.7 G/DL (ref 12.1–17)
HGB UR QL STRIP: NEGATIVE
IMM GRANULOCYTES # BLD AUTO: 0.1 K/UL (ref 0–0.04)
IMM GRANULOCYTES NFR BLD AUTO: 0 % (ref 0–0.5)
KETONES UR QL STRIP.AUTO: NEGATIVE MG/DL
LDLC SERPL CALC-MCNC: 27.4 MG/DL (ref 0–100)
LEUKOCYTE ESTERASE UR QL STRIP.AUTO: ABNORMAL
LYMPHOCYTES # BLD: 1.8 K/UL (ref 0.8–3.5)
LYMPHOCYTES NFR BLD: 14 % (ref 12–49)
MCH RBC QN AUTO: 29.6 PG (ref 26–34)
MCHC RBC AUTO-ENTMCNC: 31.9 G/DL (ref 30–36.5)
MCV RBC AUTO: 92.9 FL (ref 80–99)
MICROALBUMIN UR-MCNC: 10.6 MG/DL
MICROALBUMIN/CREAT UR-RTO: 85 MG/G (ref 0–30)
MONOCYTES # BLD: 1.6 K/UL (ref 0–1)
MONOCYTES NFR BLD: 12 % (ref 5–13)
NEUTS SEG # BLD: 9 K/UL (ref 1.8–8)
NEUTS SEG NFR BLD: 70 % (ref 32–75)
NITRITE UR QL STRIP.AUTO: NEGATIVE
NRBC # BLD: 0 K/UL (ref 0–0.01)
NRBC BLD-RTO: 0 PER 100 WBC
PH UR STRIP: 5.5 (ref 5–8)
PLATELET # BLD AUTO: 226 K/UL (ref 150–400)
PMV BLD AUTO: 10.8 FL (ref 8.9–12.9)
POTASSIUM SERPL-SCNC: 4.2 MMOL/L (ref 3.5–5.1)
PROT SERPL-MCNC: 7.2 G/DL (ref 6.4–8.2)
PROT UR STRIP-MCNC: ABNORMAL MG/DL
PSA SERPL-MCNC: 0.8 NG/ML (ref 0.01–4)
RBC # BLD AUTO: 4.63 M/UL (ref 4.1–5.7)
RBC #/AREA URNS HPF: ABNORMAL /HPF (ref 0–5)
SODIUM SERPL-SCNC: 135 MMOL/L (ref 136–145)
SP GR UR REFRACTOMETRY: 1.02 (ref 1–1.03)
TRIGL SERPL-MCNC: 168 MG/DL
TSH SERPL DL<=0.05 MIU/L-ACNC: 6.53 UIU/ML (ref 0.36–3.74)
URINE CULTURE IF INDICATED: ABNORMAL
UROBILINOGEN UR QL STRIP.AUTO: 0.2 EU/DL (ref 0.2–1)
VLDLC SERPL CALC-MCNC: 33.6 MG/DL
WBC # BLD AUTO: 12.8 K/UL (ref 4.1–11.1)
WBC URNS QL MICRO: ABNORMAL /HPF (ref 0–4)

## 2023-06-29 ENCOUNTER — OFFICE VISIT (OUTPATIENT)
Age: 88
End: 2023-06-29
Payer: MEDICARE

## 2023-06-29 VITALS
BODY MASS INDEX: 24.92 KG/M2 | SYSTOLIC BLOOD PRESSURE: 106 MMHG | WEIGHT: 178 LBS | HEART RATE: 77 BPM | RESPIRATION RATE: 20 BRPM | HEIGHT: 71 IN | OXYGEN SATURATION: 97 % | TEMPERATURE: 97.8 F | DIASTOLIC BLOOD PRESSURE: 63 MMHG

## 2023-06-29 DIAGNOSIS — E11.21 TYPE 2 DIABETES WITH NEPHROPATHY (HCC): ICD-10-CM

## 2023-06-29 DIAGNOSIS — E11.51 TYPE 2 DIABETES MELLITUS WITH PERIPHERAL VASCULAR DISEASE (HCC): ICD-10-CM

## 2023-06-29 DIAGNOSIS — G30.9 ALZHEIMER'S DISEASE, UNSPECIFIED (CODE) (HCC): ICD-10-CM

## 2023-06-29 DIAGNOSIS — N30.00 ACUTE CYSTITIS WITHOUT HEMATURIA: Primary | ICD-10-CM

## 2023-06-29 PROCEDURE — 1123F ACP DISCUSS/DSCN MKR DOCD: CPT | Performed by: INTERNAL MEDICINE

## 2023-06-29 PROCEDURE — 1036F TOBACCO NON-USER: CPT | Performed by: INTERNAL MEDICINE

## 2023-06-29 PROCEDURE — 99214 OFFICE O/P EST MOD 30 MIN: CPT | Performed by: INTERNAL MEDICINE

## 2023-06-29 PROCEDURE — G8427 DOCREV CUR MEDS BY ELIG CLIN: HCPCS | Performed by: INTERNAL MEDICINE

## 2023-06-29 PROCEDURE — G8420 CALC BMI NORM PARAMETERS: HCPCS | Performed by: INTERNAL MEDICINE

## 2023-06-29 RX ORDER — NITROFURANTOIN 25; 75 MG/1; MG/1
100 CAPSULE ORAL 2 TIMES DAILY
Qty: 10 CAPSULE | Refills: 0 | Status: SHIPPED | OUTPATIENT
Start: 2023-06-29 | End: 2023-07-04

## 2023-06-29 SDOH — ECONOMIC STABILITY: INCOME INSECURITY: HOW HARD IS IT FOR YOU TO PAY FOR THE VERY BASICS LIKE FOOD, HOUSING, MEDICAL CARE, AND HEATING?: NOT VERY HARD

## 2023-06-29 SDOH — ECONOMIC STABILITY: FOOD INSECURITY: WITHIN THE PAST 12 MONTHS, THE FOOD YOU BOUGHT JUST DIDN'T LAST AND YOU DIDN'T HAVE MONEY TO GET MORE.: NEVER TRUE

## 2023-06-29 SDOH — ECONOMIC STABILITY: FOOD INSECURITY: WITHIN THE PAST 12 MONTHS, YOU WORRIED THAT YOUR FOOD WOULD RUN OUT BEFORE YOU GOT MONEY TO BUY MORE.: NEVER TRUE

## 2023-06-29 ASSESSMENT — SOCIAL DETERMINANTS OF HEALTH (SDOH)
HOW OFTEN DO YOU GET TOGETHER WITH FRIENDS OR RELATIVES?: TWICE A WEEK
HOW OFTEN DO YOU ATTEND CHURCH OR RELIGIOUS SERVICES?: 1 TO 4 TIMES PER YEAR
HOW OFTEN DO YOU ATTENT MEETINGS OF THE CLUB OR ORGANIZATION YOU BELONG TO?: 1 TO 4 TIMES PER YEAR
IN A TYPICAL WEEK, HOW MANY TIMES DO YOU TALK ON THE PHONE WITH FAMILY, FRIENDS, OR NEIGHBORS?: TWICE A WEEK
DO YOU BELONG TO ANY CLUBS OR ORGANIZATIONS SUCH AS CHURCH GROUPS UNIONS, FRATERNAL OR ATHLETIC GROUPS, OR SCHOOL GROUPS?: YES

## 2023-06-29 ASSESSMENT — PATIENT HEALTH QUESTIONNAIRE - PHQ9
2. FEELING DOWN, DEPRESSED OR HOPELESS: 0
1. LITTLE INTEREST OR PLEASURE IN DOING THINGS: 0
SUM OF ALL RESPONSES TO PHQ9 QUESTIONS 1 & 2: 0
SUM OF ALL RESPONSES TO PHQ QUESTIONS 1-9: 0

## 2023-06-29 ASSESSMENT — ANXIETY QUESTIONNAIRES
5. BEING SO RESTLESS THAT IT IS HARD TO SIT STILL: 0
7. FEELING AFRAID AS IF SOMETHING AWFUL MIGHT HAPPEN: 0
3. WORRYING TOO MUCH ABOUT DIFFERENT THINGS: 0
6. BECOMING EASILY ANNOYED OR IRRITABLE: 0
IF YOU CHECKED OFF ANY PROBLEMS ON THIS QUESTIONNAIRE, HOW DIFFICULT HAVE THESE PROBLEMS MADE IT FOR YOU TO DO YOUR WORK, TAKE CARE OF THINGS AT HOME, OR GET ALONG WITH OTHER PEOPLE: NOT DIFFICULT AT ALL
GAD7 TOTAL SCORE: 0
1. FEELING NERVOUS, ANXIOUS, OR ON EDGE: 0
4. TROUBLE RELAXING: 0
2. NOT BEING ABLE TO STOP OR CONTROL WORRYING: 0

## 2023-11-02 ENCOUNTER — TELEPHONE (OUTPATIENT)
Age: 88
End: 2023-11-02

## 2023-11-02 NOTE — TELEPHONE ENCOUNTER
Patient is   and Death Certificate needs to be sighed , the  is next week. The death certificate can be found on line. Please call  Diann Sahni at [phone].

## 2023-11-08 ENCOUNTER — TELEPHONE (OUTPATIENT)
Age: 88
End: 2023-11-08

## 2023-11-08 NOTE — TELEPHONE ENCOUNTER
[facility]  home calling Jeanette Tiwari [phone] needs the dr to sign off on the death certificate     [phone] at home    [address]  [id number]

## 2024-08-09 NOTE — Clinical Note
In person evaluation    HPI  January 1, 2020, in person consultation transfer of care from Dr. Vikas Mcginnis  4/27/2020, telephone visit  8/3/2020, telephone visit  11/6/2020, video visit  8/13/2021, in person visit  2/16/2022, in person visit  8/19/2022, in person visit  3/13/2023, in person visit  11/3/2023, in person visit  8/12/2024, in person visit    88-year-old followed neurologically for:  Cognitive decline (memory loss)  Onset as far back as 2015  Difficulty remembering people's names  Previously cared for by Dr. Vikas Mcginnis  History of bipolar disorder  Abnormal EEG      Since last seen November 2023  ENT visit 12/12/2023 dizziness episodic/stable hearing loss on right side/Ménière's disease left side  PMD visit 2/5/2024 pressure ulcer left hip  NP visit 6/26/2024 (mobility difficulty/imbalance)  PMD visit 7/16/2024 physical deconditioning ordered PT/OT  No hospitalizations  No surgeries    8/12/2024,    19 out of 30  Patient has been in the wheelchair more due to poor balance  Patient's wife passed away in May 2024 which is a major stressor        Lives in assisted living  Decreased mobility  Transferring from wheelchair to bed    Does have dizziness/vertigo  Has significant hearing loss left ear is very bad, right ear has a hearing aid and it kind of works  Patient does better when he can read lips     No hallucinations/no vivid dreams  Gets up once at nighttime to go to the bathroom  Uses walker/wheelchair  Sometimes if he moves too quick he will have a little bit of dizziness    No faintness  Has some difficulty with bradycardia  No diarrhea    Only eats 1 meal a day but that is his choice  No hallucinations no vivid dreams    Walks okay with the 4 wheeled walker with hand break  Family thought that maybe he fatigues with the 25-minute walk  Back when he had COVID a year before it kind of took a lot out of him but he rallied a bit      Mirtazapine 30 mg at nighttime to help with sleep prescribed by other  Stent inserted. physicians    Forgetfulness is about the same although 8 years have gone by, it has fallen off a bit.              A.  Mild cognitive impairment       Onset as far back as 2015       Trouble remembering people's names       Past abnormal EEG       Previously cared for by Dr. Vikas Mcginnis       Mother had some dementia the last year of life when she lived to be 90       Scored a 23 out of 30, (8/13/2021)       Scored a 25 out of 30, (January 2020)       Subtle change over a year and a half         We discussed cognitive decline       Exelon 3 mg 1 tab p.o. twice daily      Depakote 250 mg 1 p.o. nightly        No GERD no diarrhea no lightheadedness no black tarry stools no blood in the stool      Does have severe hearing loss at baseline      B.  Has history of bipolar disorder        Neurologic review from original visit August 2021  Patient feels that he has had a falloff in his memory has more trouble tracking and following conversations  Used to go to a group with his retired friends from [employer] but they cannot get together due to Covid  Has severe hearing loss in both ears  Hearing doctor thought that they could maybe make a hearing aid that would work on the left ear but the not sure  With the mask on and talking to him he misses a lot of the conversation though  I am sure this is causing some difficulty with his memory recall and tracking conversations    Patient is still coherent can should chat about multiple topics  Can jump from topic to topic fairly easy  Does better when we talk about more things from the past  Scored a 23 out of 30, (8/13/2021)  Scored a 25 out of 30, (January 2020)  Subtle change over a year and a half        Neurologic summary:  Previously followed by Dr. Vikas Mcginnis  Mild cognitive impairment as far back as 2015  Patient works for [employer] as a   Retired in 1998  [facility] testing January 2020, 25 out of 30  Seems to have difficulty remembering people's names  Has significant hearing loss right  greater than left left is not enough to be helped by hearing aid  Past history of bipolar disorder  Mother with dementia in the last year of her life at age 90  Uncle last year of life age 88 developed dementia  In the distant past of been on Depakote for 2 to 3 years for bipolar disorder  Has an abnormal EEG  Patient restarted on Depakote   Talked about the pros and cons of using this to maybe stabilize the temporal lobe see if his symptoms are little less severe may be slow down the progression  His daughter has depression and is on Depakote    Patient tends to be quite active rows or exercises on the exercise bike regularly  Watches his diet hemoglobin A1c's have been good  Tries to stay active volunteers and participates in activities      Past medical history  Mild cognitive impairment  Bipolar disorder  Prostate cancer  Benign prostatic hypertrophy  Seborrheic dermatitis  Osteoarthritis with shoulder surgery  Left leg edema    Habits   Non-smoker  Does not drink alcohol  Worked as a [employer]  retired in   Lives in [employer] apartment with elevator  Does exercise regularly stays active      Family history  Mother had glaucoma congestive heart failure dementia  about age 90  Father  at 34 with testicular cancer  He has 1 sister with some diabetes  Daughter with depression treated with Depakote  Uncle with difficulty with memory at the age of 88 last year of his life      Work-up review  MRI scan brain 2016 mild to moderate atrophy progressed mildly from   Laboratory data 2019  Sodium 140 potassium 4.9 BUN 8 creatinine 0.8 glucose 94  White blood count 6.3 hemoglobin 13.2 platelets 313,000  Hemoglobin A1c 5.8%  Outside records from Dr. Mcginnis 2018, 2018 reviewed  EEG 2020 rare F7/T3 sharp waves with RAINE activity with hyperventilation mild to moderately abnormal  B12 689 ,TSH 1.57, (2020)    [facility]  2020,    25 out of 30   2021,   23 out of  30   8/19/2022,   22 out of 30  3/13/2023,    24 out of 30  8/12/2024,    19 out of 30    Laboratory data review                      12/2022          5/2023        3/2024  NA/K             138/4.6          136/4.2       137/4.8  BUN/Cr          3.7/0.73        6/0.82          21.4/0.74  GLU               91                  87              100  AST               30                                    28  WBC/HGB     5.7/13.2         5.3/13.3     11.3/13.3  PLTs              226,000         227,000      25,000  HGBA1C        5.8                5.9  B12                                                        718  TSH                                                       1.52    Exam    Review of system   Pertinent positives and negatives  Chronic hearing loss left much worse than right has a hearing aid on the right  Reads lips    No hallucinations  No vivid dreams    Dizziness periodically  No diplopia  No dysarthria  No dysphagia  No diarrhea  No tremor  No chest pain no shortness of breath  No headache      Ataxia uses the walker and cane  Discussed gait safety    Otherwise review systems negative    General exam  Blood pressure 90/50, pulse 75  Alert oriented x3  HEENT significant for hearing loss in left ear and right ear poor hearing and has a hearing aid  Lungs clear  Abdomen soft  No edema the feet  Patient fan only eats 1 meal per day goes down to the cafeteria with his wheelchair being pushed    Neurologic exam  Alert orient x3  Normal prosody speech  Normal naming  Normal comprehension  Normal repetition  No aphasia  No neglect  [facility]  3/13/2023,    24 out of 30  8/12/2024,    19 out of 30    Cranial 2 through 12 are significant for  Deaf in left ear significant hearing loss of the other  No ophthalmoplegia  No nystagmus  Face symmetrical  Tongue twisters okay  Visual fields intact    Upper extremities  No drift no tremor normal finger-nose  Has chronic decreased range of motion of the shoulders right worse  than left previous shoulder surgery    Lower extremities  Distal proximal strength good    Gait  Slow to get up pushing off with both hands on the wheelchair  Stands next to the wheelchair with some standby assistance slowly tries to march in place but has a little bit of retropulsion  Has flexed posture  High risk for falls mainly spends most of his time in the wheelchair but does transfer              Assessment/Plan     1.  MCI (mild cognitive impairment) with memory loss (G31.84)        Mild cognitive impairment      Onset as far back as 2015       Family history of dementia in the later years mom and uncle       History of bipolar disorder       Abnormal EEG F7/T3 sharp wave    8/12/2024,    19 out of 30           Depakote 250 mg 1 tablet nightly       Exelon 3 mg capsule twice daily (added 8/13/2021)    Tolerating medication    Discussed neurodegenerative disease and progression  Discussed as time goes on there is less neurologic reserve  With significant hearing loss this can affect his cognition also  Discussed that more meds may not necessarily make things better.  Discussed gait safety   Patient's wife passed away May 2024    Refilled medication  Exelon/Depakote    family prefers to follow-up in about a year    Medications refilled  Discussed multiple issues as above  Continue medications the same  Total care time today 30 minutes  The longitudinal plan of care for the diagnosis(es)/condition(s) as documented were addressed during this visit. Due to the added complexity in care, I will continue to support Patrick in the subsequent management and with ongoing continuity of care.        As part of visit today  Reviewed multiple notes as below  Reviewed laboratory data  ENT visit 12/12/2023 dizziness episodic/stable hearing loss on right side/Ménière's disease left side  PMD visit 2/5/2024 pressure ulcer left hip  NP visit 6/26/2024 (mobility difficulty/imbalance)  PMD visit 7/16/2024 physical deconditioning  ordered PT/OT

## (undated) DEVICE — TUBING PRSS MON L6IN PVC M FEM CONN

## (undated) DEVICE — COPILOT BLEEDBACK CONTROL VALVE: Brand: COPILOT

## (undated) DEVICE — NC TREK CORONARY DILATATION CATHETER 4.5 MM X 8 MM / RAPID-EXCHANGE: Brand: NC TREK

## (undated) DEVICE — TREK CORONARY DILATATION CATHETER 3.50 MM X 15 MM / RAPID-EXCHANGE: Brand: TREK

## (undated) DEVICE — HI-TORQUE VERSACORE FLOPPY GUIDE WIRE SYSTEM 145 CM: Brand: HI-TORQUE VERSACORE

## (undated) DEVICE — 40 MHZ CORONARY IMAGING CATHETER: Brand: OPTICROSS

## (undated) DEVICE — CATH GUID .056IN 6FR 150CM -- GUIDELINER V3

## (undated) DEVICE — TREK CORONARY DILATATION CATHETER 2.50 MM X 12 MM / RAPID-EXCHANGE: Brand: TREK

## (undated) DEVICE — SUT SLK 0 30IN SH BLK --

## (undated) DEVICE — GUIDEWIRE VASC L145CM 0.035IN J TIP L3MM PTFE FIX COR NAMIC

## (undated) DEVICE — DRAPE OPHTH 4 PLY SGL FEN

## (undated) DEVICE — SPLINT WR POS F/ARTERIAL ACC -- BX/10

## (undated) DEVICE — CUSTOM KT PTCA INFL DEV K05 00053H

## (undated) DEVICE — DRAPE PRB US TRNSDCR 6X96IN --

## (undated) DEVICE — Device: Brand: PROWATER

## (undated) DEVICE — GLIDESHEATH SLENDER ACCESS KIT: Brand: GLIDESHEATH SLENDER

## (undated) DEVICE — ADULT SPO2 SENSOR: Brand: NELLCOR

## (undated) DEVICE — MINI TREK CORONARY DILATATION CATHETER 2.0 MM X 15 MM / RAPID-EXCHANGE: Brand: MINI TREK

## (undated) DEVICE — PINNACLE PRECISION ACCESS SYSTEM INTRODUCER SHEATH: Brand: PINNACLE PRECISION ACCESS SYSTEM

## (undated) DEVICE — DISPOSABLE PULLBACK SLED FOR MOTORDRIVE

## (undated) DEVICE — GUIDEWIRE VASC L260CM 0.035IN J TIP L3MM PTFE FIX COR NAMIC

## (undated) DEVICE — CATHETER ETER ANGIO L110CM OD5FR ID046IN L75CM 038IN 145DEG CARD

## (undated) DEVICE — TR BAND RADIAL ARTERY COMPRESSION DEVICE: Brand: TR BAND

## (undated) DEVICE — RADIFOCUS OPTITORQUE ANGIOGRAPHIC CATHETER: Brand: OPTITORQUE

## (undated) DEVICE — COVER LT HNDL BLU PLAS

## (undated) DEVICE — Device: Brand: ASAHI SION BLUE

## (undated) DEVICE — 3M™ TEGADERM™ TRANSPARENT FILM DRESSING FRAME STYLE, 1626W, 4 IN X 4-3/4 IN (10 CM X 12 CM), 50/CT 4CT/CASE: Brand: 3M™ TEGADERM™

## (undated) DEVICE — SYR POWER 150ML 8IN FILL TUBE --

## (undated) DEVICE — ANGIOGRAPHY KIT CUST [K0910930B] [MERIT MEDICAL SYSTEMS INC]

## (undated) DEVICE — PACK PROCEDURE SURG HRT CATH

## (undated) DEVICE — CATH GUID COR AL10 6FR 100CM -- LAUNCHER

## (undated) DEVICE — TREK CORONARY DILATATION CATHETER 4.0 MM X 12 MM / RAPID-EXCHANGE: Brand: TREK

## (undated) DEVICE — XIENCE SIERRA™ EVEROLIMUS ELUTING CORONARY STENT SYSTEM 3.50 MM X 15 MM / RAPID-EXCHANGE
Type: IMPLANTABLE DEVICE | Status: NON-FUNCTIONAL
Brand: XIENCE SIERRA™

## (undated) DEVICE — CATH GUID COR EB35 6FR 100CM -- LAUNCHER

## (undated) DEVICE — SYRINGE ANGIO 10 CC BRL STD PRNT POLYCARB LT BLU MEDALLION